# Patient Record
Sex: MALE | Race: WHITE | NOT HISPANIC OR LATINO | Employment: FULL TIME | URBAN - METROPOLITAN AREA
[De-identification: names, ages, dates, MRNs, and addresses within clinical notes are randomized per-mention and may not be internally consistent; named-entity substitution may affect disease eponyms.]

---

## 2017-06-29 RX ORDER — ALPRAZOLAM 0.5 MG/1
0.5 TABLET ORAL DAILY PRN
Qty: 10 TABLET | Refills: 0 | Status: SHIPPED | OUTPATIENT
Start: 2017-06-29 | End: 2019-02-05 | Stop reason: SDUPTHER

## 2017-08-18 ENCOUNTER — OFFICE VISIT (OUTPATIENT)
Dept: INTERNAL MEDICINE | Facility: CLINIC | Age: 42
End: 2017-08-18
Payer: COMMERCIAL

## 2017-08-18 VITALS
HEIGHT: 70 IN | SYSTOLIC BLOOD PRESSURE: 130 MMHG | BODY MASS INDEX: 27.36 KG/M2 | TEMPERATURE: 98 F | WEIGHT: 191.13 LBS | DIASTOLIC BLOOD PRESSURE: 70 MMHG

## 2017-08-18 DIAGNOSIS — M10.9 GOUT, UNSPECIFIED CAUSE, UNSPECIFIED CHRONICITY, UNSPECIFIED SITE: Primary | ICD-10-CM

## 2017-08-18 PROCEDURE — 3008F BODY MASS INDEX DOCD: CPT | Mod: S$GLB,,, | Performed by: INTERNAL MEDICINE

## 2017-08-18 PROCEDURE — 99213 OFFICE O/P EST LOW 20 MIN: CPT | Mod: S$GLB,,, | Performed by: INTERNAL MEDICINE

## 2017-08-18 PROCEDURE — 99999 PR PBB SHADOW E&M-EST. PATIENT-LVL III: CPT | Mod: PBBFAC,,, | Performed by: INTERNAL MEDICINE

## 2017-08-18 RX ORDER — INDOMETHACIN 50 MG/1
50 CAPSULE ORAL EVERY 8 HOURS PRN
Qty: 15 CAPSULE | Refills: 0 | Status: SHIPPED | OUTPATIENT
Start: 2017-08-18 | End: 2017-09-12

## 2017-08-18 NOTE — PROGRESS NOTES
Subjective:       Patient ID: Jarod Arias is a 42 y.o. male.    Chief Complaint: Toe Pain (pt reports of not walking at times, pain was more intense x1day ago. pt mentioned he had sharp pain and swelling on big toe on R foot.)    HPI     Patient is a 42 year old male here today for toe pain. Located at the right great toe, associated with redness and swelling. Began last night and was shooting, hurts to bear weight, walk and bend the great toe. No trauma, no new shoes. No skin laceration. No history of DM. No fever/chills.     Review of Systems   Constitutional: Positive for activity change. Negative for unexpected weight change.   HENT: Negative for hearing loss, rhinorrhea and trouble swallowing.    Eyes: Negative for discharge and visual disturbance.   Respiratory: Negative for chest tightness and wheezing.    Cardiovascular: Negative for chest pain and palpitations.   Gastrointestinal: Negative for blood in stool, constipation, diarrhea and vomiting.   Endocrine: Negative for polydipsia and polyuria.   Genitourinary: Negative for difficulty urinating, hematuria and urgency.   Musculoskeletal: Positive for arthralgias and joint swelling. Negative for neck pain.   Neurological: Negative for weakness and headaches.   Psychiatric/Behavioral: Negative for confusion and dysphoric mood.       Objective:      Physical Exam   Constitutional: He is oriented to person, place, and time. He appears well-developed and well-nourished. No distress.   HENT:   Head: Normocephalic and atraumatic.   Musculoskeletal:   Right great toe:  Erythema, warmth swelling noted to the R MTP joint  No skin breakdown  2+ dorsalis pedis pulse  <2 sec cap refill   Neurological: He is alert and oriented to person, place, and time.   Skin: Skin is warm and dry. He is not diaphoretic.   Nursing note and vitals reviewed.      Assessment:       1. Gout, unspecified cause, unspecified chronicity, unspecified site        Plan:       Indocin  50 mg every 8 hours prn pain, GI side effects reviewed  If no improvement, let us know and additional blood work/imaging can be done  Dietary and lifestyle changes reviewed   RTC PRN

## 2017-09-12 ENCOUNTER — LAB VISIT (OUTPATIENT)
Dept: LAB | Facility: HOSPITAL | Age: 42
End: 2017-09-12
Attending: INTERNAL MEDICINE
Payer: COMMERCIAL

## 2017-09-12 ENCOUNTER — OFFICE VISIT (OUTPATIENT)
Dept: INTERNAL MEDICINE | Facility: CLINIC | Age: 42
End: 2017-09-12
Payer: COMMERCIAL

## 2017-09-12 VITALS
SYSTOLIC BLOOD PRESSURE: 112 MMHG | DIASTOLIC BLOOD PRESSURE: 86 MMHG | BODY MASS INDEX: 26.45 KG/M2 | HEART RATE: 68 BPM | HEIGHT: 70 IN | WEIGHT: 184.75 LBS

## 2017-09-12 DIAGNOSIS — M10.071 ACUTE IDIOPATHIC GOUT INVOLVING TOE OF RIGHT FOOT: ICD-10-CM

## 2017-09-12 DIAGNOSIS — M10.071 ACUTE IDIOPATHIC GOUT INVOLVING TOE OF RIGHT FOOT: Primary | ICD-10-CM

## 2017-09-12 LAB
ALBUMIN SERPL BCP-MCNC: 4.4 G/DL
ALP SERPL-CCNC: 59 U/L
ALT SERPL W/O P-5'-P-CCNC: 22 U/L
ANION GAP SERPL CALC-SCNC: 8 MMOL/L
AST SERPL-CCNC: 24 U/L
BILIRUB SERPL-MCNC: 0.7 MG/DL
BILIRUB UR QL STRIP: NEGATIVE
BUN SERPL-MCNC: 13 MG/DL
CALCIUM SERPL-MCNC: 9.5 MG/DL
CHLORIDE SERPL-SCNC: 107 MMOL/L
CLARITY UR REFRACT.AUTO: CLEAR
CO2 SERPL-SCNC: 27 MMOL/L
COLOR UR AUTO: ABNORMAL
CREAT SERPL-MCNC: 1.3 MG/DL
EST. GFR  (AFRICAN AMERICAN): >60 ML/MIN/1.73 M^2
EST. GFR  (NON AFRICAN AMERICAN): >60 ML/MIN/1.73 M^2
GLUCOSE SERPL-MCNC: 90 MG/DL
GLUCOSE UR QL STRIP: NEGATIVE
HGB UR QL STRIP: NEGATIVE
KETONES UR QL STRIP: ABNORMAL
LEUKOCYTE ESTERASE UR QL STRIP: NEGATIVE
NITRITE UR QL STRIP: NEGATIVE
PH UR STRIP: 6 [PH] (ref 5–8)
POTASSIUM SERPL-SCNC: 4.6 MMOL/L
PROT SERPL-MCNC: 7.6 G/DL
PROT UR QL STRIP: NEGATIVE
SODIUM SERPL-SCNC: 142 MMOL/L
SP GR UR STRIP: 1 (ref 1–1.03)
URATE SERPL-MCNC: 9 MG/DL
URN SPEC COLLECT METH UR: ABNORMAL
UROBILINOGEN UR STRIP-ACNC: NEGATIVE EU/DL

## 2017-09-12 PROCEDURE — 3008F BODY MASS INDEX DOCD: CPT | Mod: S$GLB,,, | Performed by: INTERNAL MEDICINE

## 2017-09-12 PROCEDURE — 80053 COMPREHEN METABOLIC PANEL: CPT

## 2017-09-12 PROCEDURE — 36415 COLL VENOUS BLD VENIPUNCTURE: CPT

## 2017-09-12 PROCEDURE — 99999 PR PBB SHADOW E&M-EST. PATIENT-LVL III: CPT | Mod: PBBFAC,,, | Performed by: INTERNAL MEDICINE

## 2017-09-12 PROCEDURE — 84550 ASSAY OF BLOOD/URIC ACID: CPT

## 2017-09-12 PROCEDURE — 99213 OFFICE O/P EST LOW 20 MIN: CPT | Mod: S$GLB,,, | Performed by: INTERNAL MEDICINE

## 2017-09-12 PROCEDURE — 81003 URINALYSIS AUTO W/O SCOPE: CPT

## 2017-09-12 NOTE — PROGRESS NOTES
CHIEF COMPLAINT:  Followup.    HISTORY OF PRESENT ILLNESS:  The patient is a 42-year-old gentleman who was   recently seen by Dr. Velazquez on 08/18/2017, what appeared to be a gout attack   involving his right great toe.  The patient had been to [place], was eating a   lot of meat and drinking a lot of beer when the symptoms started.  Since then,   the patient has cut back on the meat and beer, he has lost about 15 pounds;   however, he states that he has been having slight flare-ups.  Last night, he ate   an 8 ounce steak.  This morning he still feels a little tight.  He has been   using ibuprofen, which appears to help.    REVIEW OF SYSTEMS:  The patient reports that he lost 15 pounds, however, when he   went to [place] he put it back on.  He does report again an 8-pound weight   loss since he saw Dr. Velazquez.  No problems with abdominal discomfort secondary to   NSAIDs.  The patient did not start Indocin out of concern of side effects.    Please see the patient own review of systems.  The patient reports having some   joint pain and arthralgias involving his toe.  He states that his toe currently   is not very painful.  He is aware of it, but he is able to ambulate and walk.    PHYSICAL EXAMINATION:  PULMONARY:  Good inspiratory, expiratory breath sounds are heard.  Lungs are   clear to auscultation.  CARDIOVASCULAR:  S1, S2.  EXTREMITIES:  Without edema.  ABDOMEN:  Nontender, nondistended, without hepatosplenomegaly.  The patient's right fifth toe was evaluated.  He had 2+ dorsal pedal pulses.    The toe was not red.  I could actually manipulate it without much discomfort.    The area in question appears to be the MTP.    ASSESSMENT:  Gout of the right foot.    PLAN:  We will put the patient in for CMP, uric acid level as well as a UA.  He   is to continue watch his diet.  He is to follow up pending results.      MIRNA/LILIYA  dd: 09/12/2017 14:00:37 (CDT)  td: 09/13/2017 01:35:44 (CDT)  Doc ID   #[id number]  Job ID  #[id number]    CC:     Answers for HPI/ROS submitted by the patient on 9/12/2017   activity change: No  unexpected weight change: No  neck pain: No  hearing loss: No  rhinorrhea: No  trouble swallowing: No  eye discharge: No  visual disturbance: No  chest tightness: No  wheezing: No  chest pain: No  palpitations: No  blood in stool: No  constipation: No  vomiting: No  diarrhea: No  polydipsia: No  polyuria: No  difficulty urinating: No  urgency: No  hematuria: No  joint swelling: Yes  arthralgias: Yes  headaches: No  weakness: No  confusion: No  dysphoric mood: No

## 2017-09-13 ENCOUNTER — TELEPHONE (OUTPATIENT)
Dept: INTERNAL MEDICINE | Facility: CLINIC | Age: 42
End: 2017-09-13

## 2017-09-13 ENCOUNTER — PATIENT MESSAGE (OUTPATIENT)
Dept: INTERNAL MEDICINE | Facility: CLINIC | Age: 42
End: 2017-09-13

## 2017-09-13 DIAGNOSIS — M10.00 ACUTE IDIOPATHIC GOUT, UNSPECIFIED SITE: Primary | ICD-10-CM

## 2017-09-13 NOTE — TELEPHONE ENCOUNTER
----- Message from Alex Schofield MD sent at 9/13/2017  7:04 AM CDT -----  Please contact and schedule a 24 hour urine for uric acid. Order is in.

## 2017-09-14 NOTE — TELEPHONE ENCOUNTER
Notified pt I  schedule a 24 hour urine for uric acid per Dr Schofield . Pt states he will come get the container tomorrow

## 2017-09-14 NOTE — TELEPHONE ENCOUNTER
----- Message from Khalida Johnson sent at 9/14/2017  8:20 AM CDT -----  Contact: Self/[phone]  Type: Returning a call    Who left a message?Shruthi    When did the practice call?today 9/14/2017    Comments:Please advise        Thanks!!!

## 2017-09-21 ENCOUNTER — LAB VISIT (OUTPATIENT)
Dept: LAB | Facility: HOSPITAL | Age: 42
End: 2017-09-21
Attending: INTERNAL MEDICINE
Payer: COMMERCIAL

## 2017-09-21 DIAGNOSIS — M10.00 ACUTE IDIOPATHIC GOUT, UNSPECIFIED SITE: ICD-10-CM

## 2017-09-21 LAB
URATE 24H UR-MRATE: 18.3 MG/HR
URATE UR-MCNC: 17.2 MG/DL
URIC ACID URINE (MG/SPEC): 438.6 MG/SPEC
URINE COLLECTION DURATION: 24 HR
URINE VOLUME: 2550 ML

## 2017-09-21 PROCEDURE — 84560 ASSAY OF URINE/URIC ACID: CPT

## 2017-10-07 ENCOUNTER — PATIENT MESSAGE (OUTPATIENT)
Dept: INTERNAL MEDICINE | Facility: CLINIC | Age: 42
End: 2017-10-07

## 2017-11-01 ENCOUNTER — OFFICE VISIT (OUTPATIENT)
Dept: INTERNAL MEDICINE | Facility: CLINIC | Age: 42
End: 2017-11-01
Payer: COMMERCIAL

## 2017-11-01 VITALS
RESPIRATION RATE: 20 BRPM | HEIGHT: 71 IN | DIASTOLIC BLOOD PRESSURE: 72 MMHG | WEIGHT: 177.25 LBS | HEART RATE: 80 BPM | SYSTOLIC BLOOD PRESSURE: 104 MMHG | BODY MASS INDEX: 24.81 KG/M2

## 2017-11-01 DIAGNOSIS — Z12.5 PROSTATE CANCER SCREENING: ICD-10-CM

## 2017-11-01 DIAGNOSIS — M1A.0710 CHRONIC IDIOPATHIC GOUT INVOLVING TOE OF RIGHT FOOT WITHOUT TOPHUS: ICD-10-CM

## 2017-11-01 DIAGNOSIS — Z00.00 ANNUAL PHYSICAL EXAM: Primary | ICD-10-CM

## 2017-11-01 LAB
BILIRUB UR QL STRIP: NEGATIVE
CLARITY UR REFRACT.AUTO: CLEAR
COLOR UR AUTO: YELLOW
GLUCOSE UR QL STRIP: NEGATIVE
HGB UR QL STRIP: NEGATIVE
KETONES UR QL STRIP: NEGATIVE
LEUKOCYTE ESTERASE UR QL STRIP: NEGATIVE
NITRITE UR QL STRIP: NEGATIVE
PH UR STRIP: 7 [PH] (ref 5–8)
PROT UR QL STRIP: NEGATIVE
SP GR UR STRIP: 1.01 (ref 1–1.03)
URN SPEC COLLECT METH UR: NORMAL
UROBILINOGEN UR STRIP-ACNC: NEGATIVE EU/DL

## 2017-11-01 PROCEDURE — 81003 URINALYSIS AUTO W/O SCOPE: CPT

## 2017-11-01 PROCEDURE — 99999 PR PBB SHADOW E&M-EST. PATIENT-LVL III: CPT | Mod: PBBFAC,,, | Performed by: INTERNAL MEDICINE

## 2017-11-01 PROCEDURE — 99396 PREV VISIT EST AGE 40-64: CPT | Mod: S$GLB,,, | Performed by: INTERNAL MEDICINE

## 2017-11-01 RX ORDER — ALLOPURINOL 100 MG/1
100 TABLET ORAL DAILY
Qty: 30 TABLET | Refills: 3 | Status: SHIPPED | OUTPATIENT
Start: 2017-11-01 | End: 2017-11-29 | Stop reason: SDUPTHER

## 2017-11-01 NOTE — PROGRESS NOTES
CHIEF COMPLAINT:  Physical exam.    HISTORY OF PRESENT ILLNESS:  The patient is a 42-year-old gentleman who comes in   today for annual physical exam.  The patient was recently diagnosed with gout   involving his right great toe.  Symptoms started after he had gone to [place]   and had been eating a lot of meat.  The patient since then modified his diet and   has been eating very little red meat, mostly vegetables.  He has lost a good   bit of weight.  He reports having three minor episodes of gout despite modifying   his diet.  This does have him concerned.    REVIEW OF SYSTEMS:  Please see the patient-entered review of systems.  The   patient did lose weight, which is not unexpected since he went on a diet.  He   has his eyes checked at [facility].  The patient has not been   exercising.  He does get a little bit of constipation on occasion despite going   on a high-fiber diet, eating vegetables.    PHYSICAL EXAMINATION:  GENERAL APPEARANCE:  No acute distress.  HEENT:  Conjunctivae are clear.  Pupils are equal.  TMs are clear.  Nasal septum   is midline without discharge.  Oropharynx is without erythema.  NECK:  Trachea is midline without thyromegaly.  PULMONARY:  Good inspiratory and expiratory breath sounds are heard.  Lungs are   clear to auscultation.  CARDIOVASCULAR:  S1, S2.  2+ carotid pulses without bruits.  EXTREMITIES:  Without edema.  The patient's  right foot was evaluated.  No   evidence of gout currently.  ABDOMEN:  Nontender, nondistended without hepatosplenomegaly.  GENITOURINARY:  Penis and testes were normal.  LYMPHATICS:  No cervical, axillary or inguinal adenopathy.    ASSESSMENT:  1.  Annual physical exam.  2.  Chronic gout.    PLAN:  We will put the patient in to see Rheumatology.  We will also start him   on allopurinol 100 mg once a day.  We will also schedule a CBC, CMP, lipid   panel, PSA, uric acid and UA.  The patient is to follow up pending results.      MIRNA/LILIYA  dd:  11/01/2017 14:11:04 (CDT)  td: 11/02/2017 10:23:22 (CDT)  Doc ID   #[id number]  Job ID #[id number]    CC:     Answers for HPI/ROS submitted by the patient on 10/30/2017   activity change: No  unexpected weight change: No  neck pain: No  hearing loss: No  rhinorrhea: No  trouble swallowing: No  eye discharge: No  visual disturbance: No  chest tightness: No  wheezing: No  chest pain: No  palpitations: No  blood in stool: No  constipation: No  vomiting: No  diarrhea: No  polydipsia: No  polyuria: No  difficulty urinating: No  urgency: No  hematuria: No  joint swelling: No  arthralgias: No  headaches: No  weakness: No  confusion: No  dysphoric mood: No

## 2017-11-03 ENCOUNTER — LAB VISIT (OUTPATIENT)
Dept: LAB | Facility: HOSPITAL | Age: 42
End: 2017-11-03
Attending: INTERNAL MEDICINE
Payer: COMMERCIAL

## 2017-11-03 DIAGNOSIS — M1A.0710 CHRONIC IDIOPATHIC GOUT INVOLVING TOE OF RIGHT FOOT WITHOUT TOPHUS: ICD-10-CM

## 2017-11-03 DIAGNOSIS — Z12.5 PROSTATE CANCER SCREENING: ICD-10-CM

## 2017-11-03 DIAGNOSIS — Z00.00 ANNUAL PHYSICAL EXAM: ICD-10-CM

## 2017-11-03 LAB
ALBUMIN SERPL BCP-MCNC: 4.6 G/DL
ALP SERPL-CCNC: 54 U/L
ALT SERPL W/O P-5'-P-CCNC: 17 U/L
ANION GAP SERPL CALC-SCNC: 9 MMOL/L
AST SERPL-CCNC: 21 U/L
BASOPHILS # BLD AUTO: 0.02 K/UL
BASOPHILS NFR BLD: 0.5 %
BILIRUB SERPL-MCNC: 0.5 MG/DL
BUN SERPL-MCNC: 10 MG/DL
CALCIUM SERPL-MCNC: 9.7 MG/DL
CHLORIDE SERPL-SCNC: 107 MMOL/L
CHOLEST SERPL-MCNC: 169 MG/DL
CHOLEST/HDLC SERPL: 3.1 {RATIO}
CO2 SERPL-SCNC: 26 MMOL/L
COMPLEXED PSA SERPL-MCNC: 0.25 NG/ML
CREAT SERPL-MCNC: 1.1 MG/DL
DIFFERENTIAL METHOD: NORMAL
EOSINOPHIL # BLD AUTO: 0.2 K/UL
EOSINOPHIL NFR BLD: 4.8 %
ERYTHROCYTE [DISTWIDTH] IN BLOOD BY AUTOMATED COUNT: 14.3 %
EST. GFR  (AFRICAN AMERICAN): >60 ML/MIN/1.73 M^2
EST. GFR  (NON AFRICAN AMERICAN): >60 ML/MIN/1.73 M^2
GLUCOSE SERPL-MCNC: 93 MG/DL
HCT VFR BLD AUTO: 46.4 %
HDLC SERPL-MCNC: 54 MG/DL
HDLC SERPL: 32 %
HGB BLD-MCNC: 15.8 G/DL
LDLC SERPL CALC-MCNC: 100.6 MG/DL
LYMPHOCYTES # BLD AUTO: 1.5 K/UL
LYMPHOCYTES NFR BLD: 34.9 %
MCH RBC QN AUTO: 29 PG
MCHC RBC AUTO-ENTMCNC: 34.1 G/DL
MCV RBC AUTO: 85 FL
MONOCYTES # BLD AUTO: 0.5 K/UL
MONOCYTES NFR BLD: 11.2 %
NEUTROPHILS # BLD AUTO: 2.1 K/UL
NEUTROPHILS NFR BLD: 48.1 %
NONHDLC SERPL-MCNC: 115 MG/DL
PLATELET # BLD AUTO: 252 K/UL
PMV BLD AUTO: 9.5 FL
POTASSIUM SERPL-SCNC: 5.2 MMOL/L
PROT SERPL-MCNC: 7.5 G/DL
RBC # BLD AUTO: 5.45 M/UL
SODIUM SERPL-SCNC: 142 MMOL/L
TRIGL SERPL-MCNC: 72 MG/DL
URATE SERPL-MCNC: 8.2 MG/DL
WBC # BLD AUTO: 4.38 K/UL

## 2017-11-03 PROCEDURE — 84153 ASSAY OF PSA TOTAL: CPT

## 2017-11-03 PROCEDURE — 36415 COLL VENOUS BLD VENIPUNCTURE: CPT

## 2017-11-03 PROCEDURE — 80061 LIPID PANEL: CPT

## 2017-11-03 PROCEDURE — 85025 COMPLETE CBC W/AUTO DIFF WBC: CPT

## 2017-11-03 PROCEDURE — 84550 ASSAY OF BLOOD/URIC ACID: CPT

## 2017-11-03 PROCEDURE — 80053 COMPREHEN METABOLIC PANEL: CPT

## 2017-11-04 ENCOUNTER — PATIENT MESSAGE (OUTPATIENT)
Dept: INTERNAL MEDICINE | Facility: CLINIC | Age: 42
End: 2017-11-04

## 2017-11-04 DIAGNOSIS — E87.5 SERUM POTASSIUM ELEVATED: Primary | ICD-10-CM

## 2017-11-09 ENCOUNTER — LAB VISIT (OUTPATIENT)
Dept: LAB | Facility: HOSPITAL | Age: 42
End: 2017-11-09
Attending: INTERNAL MEDICINE
Payer: COMMERCIAL

## 2017-11-09 DIAGNOSIS — E87.5 SERUM POTASSIUM ELEVATED: ICD-10-CM

## 2017-11-09 LAB
ANION GAP SERPL CALC-SCNC: 7 MMOL/L
BUN SERPL-MCNC: 12 MG/DL
CALCIUM SERPL-MCNC: 9 MG/DL
CHLORIDE SERPL-SCNC: 106 MMOL/L
CO2 SERPL-SCNC: 28 MMOL/L
CREAT SERPL-MCNC: 1.2 MG/DL
EST. GFR  (AFRICAN AMERICAN): >60 ML/MIN/1.73 M^2
EST. GFR  (NON AFRICAN AMERICAN): >60 ML/MIN/1.73 M^2
GLUCOSE SERPL-MCNC: 138 MG/DL
POTASSIUM SERPL-SCNC: 4.2 MMOL/L
SODIUM SERPL-SCNC: 141 MMOL/L

## 2017-11-09 PROCEDURE — 80048 BASIC METABOLIC PNL TOTAL CA: CPT

## 2017-11-09 PROCEDURE — 36415 COLL VENOUS BLD VENIPUNCTURE: CPT

## 2017-11-12 ENCOUNTER — PATIENT MESSAGE (OUTPATIENT)
Dept: INTERNAL MEDICINE | Facility: CLINIC | Age: 42
End: 2017-11-12

## 2017-11-29 ENCOUNTER — OFFICE VISIT (OUTPATIENT)
Dept: RHEUMATOLOGY | Facility: CLINIC | Age: 42
End: 2017-11-29
Payer: COMMERCIAL

## 2017-11-29 VITALS
DIASTOLIC BLOOD PRESSURE: 79 MMHG | RESPIRATION RATE: 18 BRPM | SYSTOLIC BLOOD PRESSURE: 115 MMHG | HEART RATE: 70 BPM | WEIGHT: 173.63 LBS | BODY MASS INDEX: 24.56 KG/M2

## 2017-11-29 DIAGNOSIS — Z55.9 EDUCATIONAL CIRCUMSTANCE: ICD-10-CM

## 2017-11-29 DIAGNOSIS — M10.9 GOUT, ARTHRITIS: Primary | ICD-10-CM

## 2017-11-29 PROCEDURE — 99999 PR PBB SHADOW E&M-EST. PATIENT-LVL III: CPT | Mod: PBBFAC,,, | Performed by: INTERNAL MEDICINE

## 2017-11-29 PROCEDURE — 99244 OFF/OP CNSLTJ NEW/EST MOD 40: CPT | Mod: S$GLB,,, | Performed by: INTERNAL MEDICINE

## 2017-11-29 RX ORDER — COLCHICINE 0.6 MG/1
TABLET ORAL
Qty: 70 TABLET | Refills: 3 | Status: SHIPPED | OUTPATIENT
Start: 2017-11-29 | End: 2019-07-24 | Stop reason: SDUPTHER

## 2017-11-29 RX ORDER — ALLOPURINOL 100 MG/1
300 TABLET ORAL DAILY
Qty: 90 TABLET | Refills: 2 | Status: SHIPPED | OUTPATIENT
Start: 2017-11-29 | End: 2018-04-20 | Stop reason: SDUPTHER

## 2017-11-29 SDOH — SOCIAL DETERMINANTS OF HEALTH (SDOH): PROBLEMS RELATED TO EDUCATION AND LITERACY, UNSPECIFIED: Z55.9

## 2017-11-29 ASSESSMENT — ROUTINE ASSESSMENT OF PATIENT INDEX DATA (RAPID3)
AM STIFFNESS SCORE: 0, NO
FATIGUE SCORE: 0
PSYCHOLOGICAL DISTRESS SCORE: 0
PATIENT GLOBAL ASSESSMENT SCORE: 0
PAIN SCORE: 0
TOTAL RAPID3 SCORE: 0
MDHAQ FUNCTION SCORE: 0

## 2017-11-29 NOTE — PROGRESS NOTES
Subjective:       Patient ID: Jarod Arias Sr. is a 42 y.o. male sent by Dr. Schofield for a consultation on gout    Chief Complaint: No chief complaint on file.    HPI   Generally healthy 42 yr old man sent for joint pain and swelling c/w gout.   On Aug 17 felt like he stubbed his R big toe. It became sore, red & painful to touch & swollen.  Saw Dr. Velazquez and she felt it was gout.  He was given information and prescribed indomethacin but he did not take it. It resolved wi 2 days on its own.    Has subsequently had small attacks always in his R toe --4-5 episodes characterized mostly by soreness, but he was able to walk. These tended to resolve on their own within 1 day.  In October had an attack that lasted a bit longer and also had an episode on 11/13 that took 2 days to resolve. Ibuprofen 400 mg helped.  Was started on allopurinol 100 mg/d about 1 month ago.   Used to drink once a week but over the summer drank more. He drank twice a week anywhere from 2-4 beers at a time. No homemade liquor. Had also gotten heavy and had poor eating habits. Now has been paying attention to his diet and has lost at least 20 lbs in 3 months. No personal hx of renal calculi. No Pb exposure. No renal calculi.   Has a maternal uncle that probably has gout.  No family hx of kidney stones.           Current Outpatient Prescriptions   Medication Sig Dispense Refill    allopurinol (ZYLOPRIM) 100 MG tablet Take 1 tablet (100 mg total) by mouth once daily. 30 tablet 3    CETIRIZINE HCL (ZYRTEC ORAL) Take by mouth.      alprazolam (XANAX) 0.5 MG tablet Take 1 tablet (0.5 mg total) by mouth daily as needed for Anxiety. 10 tablet 0     No current facility-administered medications for this visit.        Review of patient's allergies indicates:   Allergen Reactions    No known drug allergies        Past Medical History:   Diagnosis Date    Allergy     Elevated blood pressure reading without diagnosis of hypertension     Gout      Left wrist fracture      Past Surgical History:   Procedure Laterality Date    PILONIDAL CYST DRAINAGE      WISDOM TOOTH EXTRACTION             Review of Systems   Constitutional: Negative.  Negative for fatigue and fever.   HENT: Negative.  Negative for hearing loss, mouth sores, sore throat, tinnitus and trouble swallowing.    Eyes: Negative.  Negative for visual disturbance.   Respiratory: Negative.  Negative for cough, choking, chest tightness and shortness of breath.    Cardiovascular: Negative.  Negative for chest pain, palpitations and leg swelling.   Gastrointestinal: Negative.  Negative for abdominal pain, blood in stool, constipation, diarrhea, nausea and vomiting.   Genitourinary: Negative.  Negative for frequency, hematuria and urgency.   Musculoskeletal: Negative.  Negative for back pain, myalgias, neck pain and neck stiffness.   Skin: Negative.  Negative for rash.   Neurological: Negative.  Negative for dizziness, syncope, numbness and headaches.   Hematological: Does not bruise/bleed easily.   Psychiatric/Behavioral: Negative.  Negative for dysphoric mood and sleep disturbance. The patient is not nervous/anxious.        Family History   Problem Relation Age of Onset    Cancer Maternal Grandmother 65     Lung    Cancer Maternal Grandfather      M:  at 60: COPD;   F: 67: HBP, CAD,  Maternal uncle with probable gout.   1 S: A& W  14 yr & 11 yr old sons in good health.   Social History   Substance Use Topics    Smoking status: Never Smoker    Smokeless tobacco: Former User    Alcohol use No   Is an .  Plays in softball league intermittently.  No formal exercise.   Objective:  /79   Pulse 70   Resp 18   Wt 78.7 kg (173 lb 9.6 oz)   BMI 24.56 kg/m²      Physical Exam   Vitals reviewed.  Constitutional: He is oriented to person, place, and time and well-developed, well-nourished, and in no distress. No distress.   HENT:   Head: Normocephalic and atraumatic.   Mouth/Throat:  Oropharynx is clear and moist. No oropharyngeal exudate.   No facial rashes  Parotids not enlarged  No oral ulcers   Eyes: Conjunctivae and EOM are normal. Pupils are equal, round, and reactive to light. Right eye exhibits no discharge. Left eye exhibits no discharge. No scleral icterus.   Neck: Neck supple. No JVD present. No tracheal deviation present. No thyromegaly present.   Cardiovascular: Normal rate, regular rhythm, normal heart sounds and intact distal pulses.  Exam reveals no gallop and no friction rub.    No murmur heard.  Pulmonary/Chest: Effort normal and breath sounds normal. No respiratory distress. He has no wheezes. He has no rales. He exhibits no tenderness.   Abdominal: Soft. Bowel sounds are normal. He exhibits no distension and no mass. There is no splenomegaly or hepatomegaly. There is no tenderness. There is no rebound and no guarding.   Lymphadenopathy:     He has no cervical adenopathy.        Right: No inguinal adenopathy present.        Left: No inguinal adenopathy present.   Neurological: He is alert and oriented to person, place, and time. He has normal reflexes. No cranial nerve deficit. Gait normal.   Proximal and distal muscle strength 5/5.   Skin: Skin is warm and dry. No rash noted. He is not diaphoretic.     Psychiatric: Mood, memory, affect and judgment normal.   Musculoskeletal: Normal range of motion. He exhibits no edema or tenderness.   Cspine FROM no tenderness  Tspine FROM no tenderness  Lspine FROM no tenderness.  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  Diet Do's and Don'ts reviewed.  Handouts provided.  He will increase allopurinol to 200 mg/d and have labs done in 6 weeks.  Target UA level is 5.  Discussed use of colchicine as a preventative.  He does not want to take daily.  He wants an rx to take to abort an acute attack: take 2 followed by 1, one hr later.  Side effects and toxicities of both allopurinol and colchicine reviewed.  Handouts provided.   He will contact us if he has any problems and will contact us after labs are drawn.  Encouraged exercise.     RTC 6 months.     CC: Alex Schofield MD

## 2017-11-29 NOTE — LETTER
November 29, 2017      Alex Schofield MD  [address]  [place] [address]           [facility] - Rheumatology  [address]  [place] [address]  Phone: [phone]  Fax: [phone]          Patient: Jarod Arias Sr.   MR Number: [id number]   YOB: 1975   Date of Visit: 11/29/2017       Dear Dr. Alex Schofield:    Thank you for referring Jarod Arias to me for evaluation. Attached you will find relevant portions of my assessment and plan of care.    If you have questions, please do not hesitate to call me. I look forward to following Jarod Arias along with you.    Sincerely,    Roxana Whitman MD    Enclosure  CC:  No Recipients    If you would like to receive this communication electronically, please contact [email or web address] or [phone] to request more information on [employer] Link access.    For providers and/or their staff who would like to refer a patient to [facility], please contact us through our one-stop-shop provider referral line, [facility], at [phone].    If you feel you have received this communication in error or would no longer like to receive these types of communications, please e-mail [email or web address]

## 2017-11-29 NOTE — PATIENT INSTRUCTIONS
Increase allopurinol to 200 mg/day and stay on this dose until labs result.    You can take colchicine to abort an attack: take 2 immediately and 1, one hour later.       Gout    Gout is an inflammation of a joint due to a build-up of gout crystals in the joint fluid. This occurs when there is an excess of uric acid (a normal waste product) in the body. Uric acid builds up in the body when the kidneys are unable to filter enough of it from the blood. This may occur with age. It is also associated with kidney disease. Gout occurs more often in people with obesity, diabetes, high blood pressure, or high levels of fats in the blood. It may run in families. Gout tends to come and go. A flare up of gout is called an attack. Drinking alcohol or eating certain foods (such as shellfish or foods with additives such as high-fructose corn syrup) may increase uric acid levels in the blood and cause a gout attack.  During a gout attack, the affected joint may become a hot, red, swollen and painful. If you have had one attack of gout, you are likely to have another. An attack of gout can be treated with medicine. If these attacks become frequent, a daily medicine may be prescribed to help the kidneys remove uric acid from the body.  Home care  During a gout attack:  · Rest painful joints. If gout affects the joints of your foot or leg, you may want to use crutches for the first few days to keep from bearing weight on the affected joint.  · When sitting or lying down, raise the painful joint to a level higher than your heart.  · Apply an ice pack (ice cubes in a plastic bag wrapped in a thin towel) over the injured area for 20 minutes every 1 to 2 hours the first day for pain relief. Continue this 3 to 4 times a day for swelling and pain.  · Avoid alcohol and foods listed below (see Preventing attacks) during a gout attack. Drink extra fluid to help flush the uric acid through your kidneys.  · If you were prescribed a medicine to  treat gout, take it as your healthcare provider has instructed. Don't skip doses.  · Take anti-inflammatory medicine as directed.   · If pain medicines have been prescribed, take them exactly as directed.    Preventing attacks  · Minimize or avoid alcohol use. Excess alcohol intake can cause a gout attack.  · Limit these foods and beverages:  ¨ Organ meats, such as kidneys and liver  ¨ Certain seafoods (anchovies, sardines, shrimp, scallops, herring, mackerel)  ¨ Wild game, meat extracts and meat gravies  ¨ Foods and beverages sweetened with high-fructose corn syrup, such as sodas  · Eat a healthy diet including low-fat and nonfat dairy, whole grains, and vegetables.  · If you are overweight, talk to your healthcare provider about a weight reduction plan. Avoid fasting or extreme low calorie diets (less than 900 calories per day). This will increase uric acid levels in the body.  · If you have diabetes or high blood pressure, work with your doctor to manage these conditions.  · Protect the joint from injury. Trauma can trigger a gout attack.  Follow-up care  Follow up with your healthcare provider, or as advised.   When to seek medical advice  Call your healthcare provider if you have any of the following:  · Fever over 100.4°F (38.ºC) with worsening joint pain  · Increasing redness around the joint  · Pain developing in another joint  · Repeated vomiting, abdominal pain, or blood in the vomit or stool (black or red color)  Date Last Reviewed: 3/1/2017  © 1417-5715 [employer]. [address] [place] [address], [place] [address]. All rights reserved. This information is not intended as a substitute for professional medical care. Always follow your healthcare professional's instructions.        Treating Gout Attacks     Raising the joint above the level of your heart can help reduce gout symptoms.     Gout is a disease that affects the joints. It is caused by excess uric acid in your blood stream that may lead to  crystals forming in your joints. Left untreated, it can lead to painful foot and joint deformities and even kidney problems. But, by treating gout early, you can relieve pain and help prevent future problems. Gout can usually be treated with medication and proper diet. In severe cases, surgery may be needed.  Gout attacks are painful and often happen more than once. Taking medications may reduce pain and prevent attacks in the future. There are also some things you can do at home to relieve symptoms.  Medications for gout  Your healthcare provider may prescribe a daily medication to reduce levels of uric acid. Reducing your uric acid levels may help prevent gout attacks. Allopurinol is one commonly used medication taken daily to reduce uric acid levels. Other medications can help relieve pain and swelling during an acute attack. Medicines such as NSAIDs (nonsteroidal anti-inflammatory medicines), steroids, and colchicine may be prescribed for intermittent use to relieve an acute gout attack. Be sure to take your medication as directed.  What you can do  Below are some things you can do at home to relieve gout symptoms. Your healthcare provider may have other tips.  · Rest the painful joint as much as you can.  · Raise the painful joint so it is at a level higher than your heart.  · Use ice for 10 minutes every 1-2 hours as possible.  How can I prevent gout?  With a little effort, you may be able to prevent gout attacks in the future. Here are some things you can do:  · Avoid foods high in purines  ¨ Certain meats (red meat, processed meat, turkey)  ¨ Organ meats (kidney, liver, sweetbread)  ¨ Shellfish (lobster, crab, shrimp, scallop, mussel)  ¨ Certain fish (anchovy, sardine, herring, mackerel)  · Take any medications prescribed by your healthcare provider.  · Lose weight if you need to.  · Reduce high fructose corn syrup in meals and drinks.  · Reduce or eliminate consumption of alcohol, particularly beer, but  also red wine and spirits.  · Control blood pressure, diabetes, and cholesterol.  · Drink plenty of water to help flush uric acid from your body.  Date Last Reviewed: 2/1/2016 © 2000-2017 [employer]. [address] [place] [address], [place] [address]. All rights reserved. This information is not intended as a substitute for professional medical care. Always follow your healthcare professional's instructions.        Gout Diet  Gout is a painful condition caused by an excess of uric acid, a waste product made by the body. Uric acid forms crystals that collect in the joints. The immune response to these crystals brings on symptoms of joint pain and swelling. This is called a gout attack. Often, medications and diet changes are combined to manage gout. Below are some guidelines for changing your diet to help you manage gout and prevent attacks. Your health care provider will help you determine the best eating plan for you.     Eating to manage gout  Weight loss for those who are overweight may help reduce gout attacks.  Eat less of these foods  Eating too many foods containing purines may raise the levels of uric acid in your body. This raises your risk for a gout attack. Try to limit these foods and drinks:  · Alcohol, such as beer and red wine. You may be told to avoid alcohol completely.  · Soft drinks that contain sugar or high fructose corn syrup  · Certain fish, including anchovies, sardines, fish eggs, and herring  · Shellfish  · Certain meats, such as red meat, hot dogs, luncheon meats, and turkey  · Organ meats, such as liver, kidneys, and sweetbreads  · Legumes, such as dried beans and peas  · Other high fat foods such as gravy, whole milk, and high fat cheeses  · Vegetables such as asparagus, cauliflower, spinach, and mushrooms used to be thought to contribute to an increased risk for a gout attack, but recent studies show that high purine vegetables don't increase the risk for a gout attack.  Eat more  of these foods  Other foods may be helpful for people with gout. Add some of these foods to your diet:  · Cherries contain chemicals that may lower uric acid.  · Omega fatty acids. These are found in some fatty fish such as salmon, certain oils (flax, olive, or nut), and nuts themselves. Omega fatty acids may help prevent inflammation due to gout.  · Dairy products that are low-fat or fat-free, such as cheese and yogurt  · Complex carbohydrate foods, including whole grains, brown rice, oats, and beans  · Coffee, in moderation  · Water, approximately 64 ounces per day  Follow-up care  Follow up with your healthcare provider as advised.  When to seek medical advice  Call your healthcare provider right away if any of these occur:  · Return of gout symptoms, usually at night:  · Severe pain, swelling, and heat in a joint, especially the base of the big toe  · Affected joint is hard to move  · Skin of the affected joint is purple or red  · Fever of 100.4°F (38°C) or higher  · Pain that doesn't get better even with prescribed medicine   Date Last Reviewed: 1/12/2016  © 7048-9820 [employer]. [address] [place] [address]. All rights reserved. This information is not intended as a substitute for professional medical care. Always follow your healthcare professional's instructions.        Eating to Prevent Gout  Gout is a painful form of arthritis caused by an excess of uric acid. This is a waste product made by the body. It builds up in the body and forms crystals that collect in the joints, bringing on a gout attack. Alcohol and certain foods can trigger a gout attack. Below are some guidelines for changing your diet to help you manage gout. Your healthcare provider can work with you to determine the best eating plan for you. Know that diet is only one part of managing gout. Take your medicines as prescribed and follow the other guidelines your healthcare provider has given you.  Foods to  limit  Eating too many foods containing purines may increase the levels of uric acid in your body and increase your risk for a gout attack. It may be best to limit these high-purine foods:  · Alcohol (beer, red wine). You may be told to avoid alcohol completely.  · Certain fish (anchovies, sardines, fish roes, herring, tuna, mussels, codfish, scallops, trout, and chidi)  · Certain meats (red meat, processed meat, kimball, turkey, wild game, and goose)  · Sauces and gravies made with meat  · Organ meats (such as liver, kidneys, sweetbreads, and tripe)  · Legumes (such as dried beans, peas)  · Mushrooms, spinach, asparagus, and cauliflower  · Yeast and yeast extract supplements  Foods to try  Some foods may be helpful for people with gout. You may want to try adding some of the following foods to your diet:  · Dark berries: These include blueberries, blackberries, and cherries. These berries contain chemicals that may lower uric acid.  · Tofu: Tofu, which is made from soy, is a good source of protein. Studies have shown that it may be a better choice than meat for people with gout.  · Omega fatty acids: These acids are found in fatty fish (such as salmon), certain oils (such as flax, olive, or nut oils), or nuts. They may help prevent inflammation due to gout.  The following guidelines are recommended by the American Medical Association for people with gout. Your diet should be:  · High in fiber, whole grains, fruits, and vegetables.  · Low in protein (15% of calories should come from protein. Choose lean sources such as soy, lean meats, and poultry).  · Low in fat (no more than 30% of calories should come from fat, with only 10% coming from animal fat).   Date Last Reviewed: 6/17/2015  © 0436-7305 [employer]. [address] [place] [address], [place] [address]. All rights reserved. This information is not intended as a substitute for professional medical care. Always follow your healthcare professional's  "instructions.        What Is Gout?  Gout is a disease that affects the joints. Left untreated, it can lead to painful foot and joint deformities and even kidney problems. But, by treating gout early, you can relieve pain and help prevent future problems. Gout can usually be treated with medication and proper diet. In severe cases, surgery may be needed.  What causes gout?  Gout is caused by an excess of uric acid (a waste product made by the body). Uric acid is excreted by the kidneys. If the uric acid level in your blood rises too high, the uric acid may form crystals that collect in the joints, bringing on a gout attack. If you have many gout attacks, crystals may form large deposits called tophi. Tophi can damage joints and cause deformity.  Who is at risk for gout?  Men are more likely to have gout than women. But women can also be affected, mostly after menopause. Some health problems, such as obesity and high cholesterol, make gout more likely. And some medications, such as diuretics (water pills), can trigger a gout attack. People who drink a lot of alcohol are at high risk for gout. Certain foods can also trigger a gout attack.  Substances that may trigger a gout attack  To help prevent a gout attack, avoid these foods:  · Alcohol (particularly beer, but also red wine and spirits)  · Certain meats (red meat, processed meat, turkey)  · Organ meats (kidney, liver, sweetbread)  · Shellfish (lobster, crab, shrimp, scallop, mussel)  · Certain fish (anchovy, sardine, herring, mackerel)   Treatment  · Lifestyle changes, including weight loss, exercise, and quitting tobacco use  · Reducing consumption of the food groups above as well as high fructose corn syrup, found in many foods including sodas and energy drinks  · Changing non-essential medications that may contribute to gout (such as thiazide diuretics--"water pills")  · Medications to reduce the amount of uric acid in the blood, such as allopurinol or " others  · Medications to treat acute gout attacks, including NSAIDs (nonsteroidal anti-inflammatory medicines), steroids, and colchicine  Date Last Reviewed: 2/1/2016  © 2949-3307 [employer]. [address] [place] [address], [place] [address]. All rights reserved. This information is not intended as a substitute for professional medical care. Always follow your healthcare professional's instructions.

## 2018-01-03 ENCOUNTER — LAB VISIT (OUTPATIENT)
Dept: LAB | Facility: HOSPITAL | Age: 43
End: 2018-01-03
Attending: INTERNAL MEDICINE
Payer: COMMERCIAL

## 2018-01-03 DIAGNOSIS — M10.9 GOUT, ARTHRITIS: ICD-10-CM

## 2018-01-03 LAB
ALBUMIN SERPL BCP-MCNC: 4.2 G/DL
ALP SERPL-CCNC: 51 U/L
ALT SERPL W/O P-5'-P-CCNC: 20 U/L
ANION GAP SERPL CALC-SCNC: 8 MMOL/L
AST SERPL-CCNC: 19 U/L
BASOPHILS # BLD AUTO: 0.02 K/UL
BASOPHILS NFR BLD: 0.4 %
BILIRUB SERPL-MCNC: 0.3 MG/DL
BUN SERPL-MCNC: 13 MG/DL
CALCIUM SERPL-MCNC: 9.1 MG/DL
CHLORIDE SERPL-SCNC: 110 MMOL/L
CO2 SERPL-SCNC: 26 MMOL/L
CREAT SERPL-MCNC: 1 MG/DL
CRP SERPL-MCNC: 1.3 MG/L
DIFFERENTIAL METHOD: ABNORMAL
EOSINOPHIL # BLD AUTO: 0.3 K/UL
EOSINOPHIL NFR BLD: 5.4 %
ERYTHROCYTE [DISTWIDTH] IN BLOOD BY AUTOMATED COUNT: 15.4 %
ERYTHROCYTE [SEDIMENTATION RATE] IN BLOOD BY WESTERGREN METHOD: 0 MM/HR
EST. GFR  (AFRICAN AMERICAN): >60 ML/MIN/1.73 M^2
EST. GFR  (NON AFRICAN AMERICAN): >60 ML/MIN/1.73 M^2
GLUCOSE SERPL-MCNC: 97 MG/DL
HCT VFR BLD AUTO: 43.4 %
HGB BLD-MCNC: 14.6 G/DL
LYMPHOCYTES # BLD AUTO: 2.4 K/UL
LYMPHOCYTES NFR BLD: 42.6 %
MCH RBC QN AUTO: 29.2 PG
MCHC RBC AUTO-ENTMCNC: 33.6 G/DL
MCV RBC AUTO: 87 FL
MONOCYTES # BLD AUTO: 0.5 K/UL
MONOCYTES NFR BLD: 8.8 %
NEUTROPHILS # BLD AUTO: 2.4 K/UL
NEUTROPHILS NFR BLD: 42.3 %
PLATELET # BLD AUTO: 257 K/UL
PMV BLD AUTO: 9.2 FL
POTASSIUM SERPL-SCNC: 3.9 MMOL/L
PROT SERPL-MCNC: 6.5 G/DL
RBC # BLD AUTO: 5 M/UL
SODIUM SERPL-SCNC: 144 MMOL/L
URATE SERPL-MCNC: 4.2 MG/DL
WBC # BLD AUTO: 5.7 K/UL

## 2018-01-03 PROCEDURE — 85651 RBC SED RATE NONAUTOMATED: CPT

## 2018-01-03 PROCEDURE — 86140 C-REACTIVE PROTEIN: CPT

## 2018-01-03 PROCEDURE — 85025 COMPLETE CBC W/AUTO DIFF WBC: CPT

## 2018-01-03 PROCEDURE — 84550 ASSAY OF BLOOD/URIC ACID: CPT

## 2018-01-03 PROCEDURE — 80053 COMPREHEN METABOLIC PANEL: CPT

## 2018-01-03 PROCEDURE — 36415 COLL VENOUS BLD VENIPUNCTURE: CPT

## 2018-01-19 ENCOUNTER — TELEPHONE (OUTPATIENT)
Dept: INTERNAL MEDICINE | Facility: CLINIC | Age: 43
End: 2018-01-19

## 2018-01-19 NOTE — TELEPHONE ENCOUNTER
----- Message from Snow Tavarez sent at 1/19/2018  8:52 AM CST -----  Contact: Self  Pt is calling to be scheduled for an appt for a weird numbness in his right foot he's had x1 week.   was unable to make an appt for anytime soon, so he is requesting Staff contact him for an appt.    He can be reached at [phone].    Thank you.

## 2018-02-05 ENCOUNTER — OFFICE VISIT (OUTPATIENT)
Dept: INTERNAL MEDICINE | Facility: CLINIC | Age: 43
End: 2018-02-05
Payer: COMMERCIAL

## 2018-02-05 ENCOUNTER — HOSPITAL ENCOUNTER (OUTPATIENT)
Dept: RADIOLOGY | Facility: HOSPITAL | Age: 43
Discharge: HOME | End: 2018-02-05
Attending: INTERNAL MEDICINE
Payer: COMMERCIAL

## 2018-02-05 VITALS
DIASTOLIC BLOOD PRESSURE: 68 MMHG | SYSTOLIC BLOOD PRESSURE: 112 MMHG | BODY MASS INDEX: 24.16 KG/M2 | HEART RATE: 62 BPM | HEIGHT: 72 IN | WEIGHT: 178.38 LBS

## 2018-02-05 DIAGNOSIS — R20.0 NUMBNESS OF FOOT: ICD-10-CM

## 2018-02-05 DIAGNOSIS — R20.0 NUMBNESS OF FOOT: Primary | ICD-10-CM

## 2018-02-05 PROCEDURE — 99999 PR PBB SHADOW E&M-EST. PATIENT-LVL III: CPT | Mod: PBBFAC,,, | Performed by: INTERNAL MEDICINE

## 2018-02-05 PROCEDURE — 99213 OFFICE O/P EST LOW 20 MIN: CPT | Mod: S$GLB,,, | Performed by: INTERNAL MEDICINE

## 2018-02-05 PROCEDURE — 73650 X-RAY EXAM OF HEEL: CPT | Mod: TC,RT

## 2018-02-05 PROCEDURE — 73650 X-RAY EXAM OF HEEL: CPT | Mod: 26,RT,, | Performed by: RADIOLOGY

## 2018-02-05 PROCEDURE — 3008F BODY MASS INDEX DOCD: CPT | Mod: S$GLB,,, | Performed by: INTERNAL MEDICINE

## 2018-02-05 NOTE — PROGRESS NOTES
CHIEF COMPLAINT:  Right heel numbness.    HISTORY OF PRESENT ILLNESS:  The patient is a 42-year-old gentleman who is   currently being treated for gout, who comes in today with complaints of numbness   involving the right heel symptoms first occurred on 01/12/2018.  He was   watching some play basketball when it felt like he had something in his shoe,   like something was sticking him, later on that night, it actually began to feel   numb.  This state feeling that way, it is more in the lateral aspect of the   heel.  He did research allopurinol and was concerned that this might be a side   effect with that medication.    REVIEW OF SYSTEMS:  Please see the patient entered review of systems.  He does   report maybe little bit of weight gain.  No problems with chest pain or   shortness of breath.  He does play softball on occasion.  He has not had a gout   flare-up in quite some time.  He cannot recall the last time he had a problem   with gout.    PHYSICAL EXAMINATION:  GENERAL APPEARANCE:  No acute distress.  HEENT:  Conjunctivae clear.  Pupils are equal and reactive.  TMs were clear.    Nasal septum was midline without discharge.  Oropharynx is without erythema.  NECK:  Trachea is midline without JVD.  PULMONARY:  Good inspiratory, expiratory breath sounds are heard.  Lungs are   clear to auscultation.  CARDIOVASCULAR:  S1, S2.  EXTREMITIES:  Without edema.  ABDOMEN:  Nontender, nondistended, without hepatosplenomegaly.  NEUROLOGIC:  Cranial nerves II through XII were intact and equal bilaterally.    , upper extremity, lower extremity motor strength was 5/5.  The patient had   2+ deep tendon reflexes of the biceps and knees bilaterally.  The patient was   able to dorsi, plantar, invert, regine both feet without any difficulty.  A   monofilament exam was performed.  The patient appeared to have equal sensation   in both feet; however, on manual manipulation of the foot, the patient did   report that the right  lateral aspect of the heel did feel little bit off on the   right foot.    ASSESSMENT:  1.  Right heel numbness.  2.  Gout, currently stable.    PLAN:  The patient's CBC, CMP, CRP, ESR from 01/03/2018 reviewed with him.  All   these were normal.  We will see about getting an x-ray of his foot.  He is to   follow up pending results.      JDS/IN  dd: 02/05/2018 07:39:28 (CST)  td: 02/05/2018 23:59:00 (CST)  Doc ID   #[id number]  Job ID #[id number]    CC:     Answers for HPI/ROS submitted by the patient on 2/3/2018   activity change: No  unexpected weight change: No  neck pain: No  hearing loss: No  rhinorrhea: No  trouble swallowing: No  eye discharge: No  visual disturbance: No  chest tightness: No  wheezing: No  chest pain: No  palpitations: No  blood in stool: No  constipation: No  vomiting: No  diarrhea: No  polydipsia: No  polyuria: No  difficulty urinating: No  urgency: No  hematuria: No  joint swelling: No  arthralgias: No  headaches: No  weakness: No  confusion: No  dysphoric mood: No

## 2018-04-20 DIAGNOSIS — M10.9 GOUT, ARTHRITIS: ICD-10-CM

## 2018-04-20 RX ORDER — ALLOPURINOL 100 MG/1
200 TABLET ORAL DAILY
Qty: 180 TABLET | Refills: 1 | Status: SHIPPED | OUTPATIENT
Start: 2018-04-20 | End: 2018-04-23 | Stop reason: SDUPTHER

## 2018-04-23 DIAGNOSIS — M10.9 GOUT, ARTHRITIS: ICD-10-CM

## 2018-04-23 RX ORDER — ALLOPURINOL 100 MG/1
200 TABLET ORAL DAILY
Qty: 180 TABLET | Refills: 1 | Status: SHIPPED | OUTPATIENT
Start: 2018-04-23 | End: 2019-05-07 | Stop reason: SDUPTHER

## 2018-07-22 NOTE — PROGRESS NOTES
Subjective:       Patient ID: Jarod Arias Sr. is a 42 y.o. male with gout    Chief Complaint: No chief complaint on file.    HPI   42 yr old man seen initially on 11/29/17 for joint pain and swelling c/w gout.  He was begun on allopurinol that was increased to 200 mg/d & his last UA level was 4.2. He would like to taper to 100 mg/d as he is doing very well and has not had to use colchicine. He states that a few times he had twinges of pain in his R big toe that went away fast. He thinks maybe this was psychologically instigated. He has not had any renal calculi but found out that his dad also had a single episode of gout years ago, but is not on any treatment.  He has not had any problems tolerating allopurinol.  Was sticking to a diet until this summer, when he put on some of the weight he had lost.     Pertinent hx  On Aug 17, 2017 felt like he stubbed his R big toe. It became sore, red & painful to touch & swollen.  Saw Dr. Velazquez and she felt it was gout.  He was given information and prescribed indomethacin but he did not take it. It resolved wi 2 days on its own.    Has subsequently had small attacks always in his R toe --4-5 episodes characterized mostly by soreness, but he was able to walk. These tended to resolve on their own within 1 day.  In October had an attack that lasted a bit longer and also had an episode on 11/13 that took 2 days to resolve. Ibuprofen 400 mg helped.  Was started on allopurinol 100 mg/d about 1 month ago.   Used to drink once a week but over the summer drank more. He drank twice a week anywhere from 2-4 beers at a time. No homemade liquor. Had also gotten heavy and had poor eating habits. Now has been paying attention to his diet and has lost at least 20 lbs in 3 months. No personal hx of renal calculi. No Pb exposure. No renal calculi.   Has a maternal uncle that probably has gout.  No family hx of kidney stones.           Current Outpatient Prescriptions   Medication Sig  Dispense Refill    allopurinol (ZYLOPRIM) 100 MG tablet Take 2 tablets (200 mg total) by mouth once daily. 180 tablet 1    alprazolam (XANAX) 0.5 MG tablet Take 1 tablet (0.5 mg total) by mouth daily as needed for Anxiety. 10 tablet 0    CETIRIZINE HCL (ZYRTEC ORAL) Take by mouth.      colchicine 0.6 mg tablet Take one twice daily; to abort an acute attack take 2 immediately, followed by 1 one hour laters. 70 tablet 3     No current facility-administered medications for this visit.        Review of patient's allergies indicates:   Allergen Reactions    No known drug allergies        Past Medical History:   Diagnosis Date    Allergy     Elevated blood pressure reading without diagnosis of hypertension     Gout     Left wrist fracture      Past Surgical History:   Procedure Laterality Date    PILONIDAL CYST DRAINAGE      WISDOM TOOTH EXTRACTION             Review of Systems   Constitutional: Negative.  Negative for fatigue, fever and unexpected weight change.   HENT: Negative.  Negative for hearing loss, mouth sores, sore throat, tinnitus and trouble swallowing.    Eyes: Negative.  Negative for redness and visual disturbance.   Respiratory: Negative.  Negative for cough, choking, chest tightness and shortness of breath.    Cardiovascular: Negative.  Negative for chest pain, palpitations and leg swelling.   Gastrointestinal: Negative.  Negative for abdominal pain, blood in stool, constipation, diarrhea, nausea and vomiting.   Genitourinary: Negative.  Negative for frequency, genital sores, hematuria and urgency.   Musculoskeletal: Negative.  Negative for back pain, myalgias, neck pain and neck stiffness.   Skin: Negative.  Negative for rash.   Neurological: Negative.  Negative for dizziness, syncope, numbness and headaches.   Hematological: Does not bruise/bleed easily.   Psychiatric/Behavioral: Negative.  Negative for dysphoric mood and sleep disturbance. The patient is not nervous/anxious.        Family  "History   Problem Relation Age of Onset    Cancer Maternal Grandmother 65        Lung    Cancer Maternal Grandfather      M:  at 60: COPD;   F: 67: HBP, CAD,  Maternal uncle with probable gout.   Father w single episode of gout.  1 S: A& W  14 yr & 11 yr old sons in good health.   Social History   Substance Use Topics    Smoking status: Never Smoker    Smokeless tobacco: Former User    Alcohol use No   Is an .  Plays in softball league intermittently.  No formal exercise, but is intending to exercise.  Objective:  /76   Pulse 67   Ht 5' 10.75" (1.797 m)   Wt 83.9 kg (185 lb)   BMI 25.98 kg/m²        Physical Exam   Vitals reviewed.  Constitutional: He is oriented to person, place, and time and well-developed, well-nourished, and in no distress. No distress.   HENT:   Head: Normocephalic and atraumatic.   Mouth/Throat: Oropharynx is clear and moist. No oropharyngeal exudate.   No facial rashes  Parotids not enlarged  No oral ulcers   Eyes: Conjunctivae and EOM are normal. Pupils are equal, round, and reactive to light. Right eye exhibits no discharge. Left eye exhibits no discharge. No scleral icterus.   Neck: Neck supple. No JVD present. No tracheal deviation present. No thyromegaly present.   Cardiovascular: Normal rate, regular rhythm, normal heart sounds and intact distal pulses.  Exam reveals no gallop and no friction rub.    No murmur heard.  Pulmonary/Chest: Effort normal and breath sounds normal. No respiratory distress. He has no wheezes. He has no rales. He exhibits no tenderness.   Abdominal: Soft. Bowel sounds are normal. He exhibits no distension and no mass. There is no splenomegaly or hepatomegaly. There is no tenderness. There is no rebound and no guarding.   Lymphadenopathy:     He has no cervical adenopathy.        Right: No inguinal adenopathy present.        Left: No inguinal adenopathy present.   Neurological: He is alert and oriented to person, place, and time. He has " normal reflexes. No cranial nerve deficit. Gait normal.   Proximal and distal muscle strength 5/5.   Skin: Skin is warm and dry. No rash noted. He is not diaphoretic.     Psychiatric: Mood, memory, affect and judgment normal.   Musculoskeletal: Normal range of motion. He exhibits no edema or tenderness.   Cspine FROM no tenderness  Tspine FROM no tenderness  Lspine FROM no tenderness.  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  We will drop further if we can.  He will contact us if he has any problems and will contact us after labs are drawn.  Encouraged exercise.     RTC 1 year or prn.

## 2018-07-25 ENCOUNTER — OFFICE VISIT (OUTPATIENT)
Dept: RHEUMATOLOGY | Facility: CLINIC | Age: 43
End: 2018-07-25
Payer: COMMERCIAL

## 2018-07-25 ENCOUNTER — LAB VISIT (OUTPATIENT)
Dept: LAB | Facility: HOSPITAL | Age: 43
End: 2018-07-25
Attending: INTERNAL MEDICINE
Payer: COMMERCIAL

## 2018-07-25 VITALS
BODY MASS INDEX: 25.9 KG/M2 | WEIGHT: 185 LBS | DIASTOLIC BLOOD PRESSURE: 76 MMHG | HEIGHT: 71 IN | SYSTOLIC BLOOD PRESSURE: 117 MMHG | HEART RATE: 67 BPM

## 2018-07-25 DIAGNOSIS — Z79.899 ENCOUNTER FOR LONG-TERM CURRENT USE OF MEDICATION: ICD-10-CM

## 2018-07-25 DIAGNOSIS — M10.9 GOUT, ARTHRITIS: ICD-10-CM

## 2018-07-25 DIAGNOSIS — M10.9 GOUT, ARTHRITIS: Primary | ICD-10-CM

## 2018-07-25 LAB — URATE SERPL-MCNC: 5 MG/DL

## 2018-07-25 PROCEDURE — 99214 OFFICE O/P EST MOD 30 MIN: CPT | Mod: S$GLB,,, | Performed by: INTERNAL MEDICINE

## 2018-07-25 PROCEDURE — 36415 COLL VENOUS BLD VENIPUNCTURE: CPT

## 2018-07-25 PROCEDURE — 99999 PR PBB SHADOW E&M-EST. PATIENT-LVL III: CPT | Mod: PBBFAC,,, | Performed by: INTERNAL MEDICINE

## 2018-07-25 PROCEDURE — 3008F BODY MASS INDEX DOCD: CPT | Mod: CPTII,S$GLB,, | Performed by: INTERNAL MEDICINE

## 2018-07-25 PROCEDURE — 84550 ASSAY OF BLOOD/URIC ACID: CPT

## 2018-07-25 ASSESSMENT — ROUTINE ASSESSMENT OF PATIENT INDEX DATA (RAPID3)
MDHAQ FUNCTION SCORE: 0
PAIN SCORE: 0
PATIENT GLOBAL ASSESSMENT SCORE: 0
AM STIFFNESS SCORE: 0, NO
PSYCHOLOGICAL DISTRESS SCORE: 0
FATIGUE SCORE: 1
TOTAL RAPID3 SCORE: 0

## 2018-07-25 NOTE — PROGRESS NOTES
Rapid3 Question Responses and Scores 7/24/2018   MDHAQ Score 0   Psychologic Score 0   Pain Score 0   When you awakened in the morning OVER THE LAST WEEK, did you feel stiff? No   Fatigue Score 1   Global Health Score 0   RAPID3 Score 0

## 2018-09-20 ENCOUNTER — OFFICE VISIT (OUTPATIENT)
Dept: INTERNAL MEDICINE | Facility: CLINIC | Age: 43
End: 2018-09-20
Payer: COMMERCIAL

## 2018-09-20 VITALS
HEIGHT: 70 IN | DIASTOLIC BLOOD PRESSURE: 84 MMHG | TEMPERATURE: 98 F | HEART RATE: 83 BPM | WEIGHT: 183.19 LBS | SYSTOLIC BLOOD PRESSURE: 126 MMHG | BODY MASS INDEX: 26.22 KG/M2 | RESPIRATION RATE: 16 BRPM

## 2018-09-20 DIAGNOSIS — J01.90 ACUTE BACTERIAL SINUSITIS: Primary | ICD-10-CM

## 2018-09-20 DIAGNOSIS — B96.89 ACUTE BACTERIAL SINUSITIS: Primary | ICD-10-CM

## 2018-09-20 PROCEDURE — 3008F BODY MASS INDEX DOCD: CPT | Mod: CPTII,S$GLB,, | Performed by: INTERNAL MEDICINE

## 2018-09-20 PROCEDURE — 99213 OFFICE O/P EST LOW 20 MIN: CPT | Mod: S$GLB,,, | Performed by: INTERNAL MEDICINE

## 2018-09-20 PROCEDURE — 99999 PR PBB SHADOW E&M-EST. PATIENT-LVL III: CPT | Mod: PBBFAC,,, | Performed by: INTERNAL MEDICINE

## 2018-09-20 RX ORDER — AMOXICILLIN AND CLAVULANATE POTASSIUM 875; 125 MG/1; MG/1
1 TABLET, FILM COATED ORAL EVERY 12 HOURS
Qty: 10 TABLET | Refills: 0 | Status: SHIPPED | OUTPATIENT
Start: 2018-09-20 | End: 2018-09-25

## 2018-09-20 NOTE — PROGRESS NOTES
"Subjective:       Patient ID: Jarod Arias Sr. is a 43 y.o. male.    Chief Complaint: Headache and Sinus Problem    HPI    His symptoms started on Monday. He has sinus pain, ear congestion, headaches. He has taken dayquil and nyquil.  He reports that he generally uses Flonase but stops whenever he gets sick.  He had sweats.  He has significant frontal headache.    Review of Systems   Constitutional: Positive for chills and diaphoresis.   HENT: Positive for ear pain, postnasal drip, sinus pressure and sinus pain.    Respiratory: Negative for cough and shortness of breath.    Neurological: Positive for headaches.       Objective:        Vitals:    09/20/18 1111   BP: 126/84   Pulse: 83   Resp: 16   Temp: 98.3 °F (36.8 °C)   TempSrc: Oral   Weight: 83.1 kg (183 lb 3.2 oz)   Height: 5' 10" (1.778 m)       Body mass index is 26.29 kg/m².    Physical Exam   Constitutional: He appears well-developed and well-nourished.   HENT:   Head: Normocephalic and atraumatic.   Right Ear: Tympanic membrane and external ear normal.   Left Ear: Tympanic membrane and external ear normal.   Nose: Right sinus exhibits maxillary sinus tenderness and frontal sinus tenderness. Left sinus exhibits maxillary sinus tenderness and frontal sinus tenderness.   Mouth/Throat: Oropharyngeal exudate and posterior oropharyngeal erythema present. No tonsillar exudate.   Eyes: Conjunctivae and EOM are normal.   Cardiovascular: Normal rate, regular rhythm, normal heart sounds and intact distal pulses.   Pulmonary/Chest: Effort normal and breath sounds normal. No respiratory distress. He has no wheezes.   Lymphadenopathy:     He has no cervical adenopathy.   Skin: Skin is warm and dry.   Psychiatric: He has a normal mood and affect.       Assessment:     1. Acute bacterial sinusitis           Plan:         1. Acute bacterial sinusitis  Have discussed with him that I recommend he resumes Flonase and has a 24 hr antihistamine.  He can use Sudafed as " needed for congestion. Add nasal saline rinse to help with congestion. Over-the-counter NSAIDs as needed for pain relief.  - amoxicillin-clavulanate 875-125mg (AUGMENTIN) 875-125 mg per tablet; Take 1 tablet by mouth every 12 (twelve) hours. for 5 days  Dispense: 10 tablet; Refill: 0           Patient note was created using MModal Dictation.  Any errors in syntax or even information may not have been identified and edited on initial review prior to signing this note.

## 2018-10-24 DIAGNOSIS — M10.9 GOUT, ARTHRITIS: ICD-10-CM

## 2018-10-24 RX ORDER — ALLOPURINOL 100 MG/1
TABLET ORAL
Qty: 180 TABLET | Refills: 0 | Status: SHIPPED | OUTPATIENT
Start: 2018-10-24 | End: 2019-07-24 | Stop reason: SDUPTHER

## 2019-01-25 ENCOUNTER — IMMUNIZATION (OUTPATIENT)
Dept: INTERNAL MEDICINE | Facility: CLINIC | Age: 44
End: 2019-01-25
Payer: COMMERCIAL

## 2019-01-25 PROCEDURE — 90686 IIV4 VACC NO PRSV 0.5 ML IM: CPT | Mod: S$GLB,,, | Performed by: INTERNAL MEDICINE

## 2019-01-25 PROCEDURE — 90471 FLU VACCINE (QUAD) GREATER THAN OR EQUAL TO 3YO PRESERVATIVE FREE IM: ICD-10-PCS | Mod: S$GLB,,, | Performed by: INTERNAL MEDICINE

## 2019-01-25 PROCEDURE — 90686 FLU VACCINE (QUAD) GREATER THAN OR EQUAL TO 3YO PRESERVATIVE FREE IM: ICD-10-PCS | Mod: S$GLB,,, | Performed by: INTERNAL MEDICINE

## 2019-01-25 PROCEDURE — 90471 IMMUNIZATION ADMIN: CPT | Mod: S$GLB,,, | Performed by: INTERNAL MEDICINE

## 2019-02-05 RX ORDER — ALPRAZOLAM 0.5 MG/1
0.5 TABLET ORAL DAILY PRN
Qty: 10 TABLET | Refills: 0 | Status: SHIPPED | OUTPATIENT
Start: 2019-02-05 | End: 2021-09-21

## 2019-04-25 ENCOUNTER — TELEPHONE (OUTPATIENT)
Dept: ADMINISTRATIVE | Facility: HOSPITAL | Age: 44
End: 2019-04-25

## 2019-04-25 ENCOUNTER — PATIENT OUTREACH (OUTPATIENT)
Dept: ADMINISTRATIVE | Facility: HOSPITAL | Age: 44
End: 2019-04-25

## 2019-04-25 DIAGNOSIS — Z00.00 ANNUAL PHYSICAL EXAM: Primary | ICD-10-CM

## 2019-04-25 DIAGNOSIS — Z12.5 SCREENING FOR PROSTATE CANCER: ICD-10-CM

## 2019-04-25 NOTE — PROGRESS NOTES
Health Maintenance Due   Topic Date Due    TETANUS VACCINE  08/16/1993     Pre-visit audit complete.

## 2019-05-03 ENCOUNTER — LAB VISIT (OUTPATIENT)
Dept: LAB | Facility: HOSPITAL | Age: 44
End: 2019-05-03
Attending: INTERNAL MEDICINE
Payer: COMMERCIAL

## 2019-05-03 DIAGNOSIS — Z12.5 SCREENING FOR PROSTATE CANCER: ICD-10-CM

## 2019-05-03 DIAGNOSIS — Z00.00 ANNUAL PHYSICAL EXAM: ICD-10-CM

## 2019-05-03 LAB
ALBUMIN SERPL BCP-MCNC: 4.2 G/DL (ref 3.5–5.2)
ALP SERPL-CCNC: 54 U/L (ref 55–135)
ALT SERPL W/O P-5'-P-CCNC: 33 U/L (ref 10–44)
ANION GAP SERPL CALC-SCNC: 7 MMOL/L (ref 8–16)
ANION GAP SERPL CALC-SCNC: 7 MMOL/L (ref 8–16)
AST SERPL-CCNC: 24 U/L (ref 10–40)
BASOPHILS # BLD AUTO: 0.04 K/UL (ref 0–0.2)
BASOPHILS NFR BLD: 0.7 % (ref 0–1.9)
BILIRUB SERPL-MCNC: 0.7 MG/DL (ref 0.1–1)
BUN SERPL-MCNC: 13 MG/DL (ref 6–20)
BUN SERPL-MCNC: 13 MG/DL (ref 6–20)
CALCIUM SERPL-MCNC: 9.7 MG/DL (ref 8.7–10.5)
CALCIUM SERPL-MCNC: 9.7 MG/DL (ref 8.7–10.5)
CHLORIDE SERPL-SCNC: 106 MMOL/L (ref 95–110)
CHLORIDE SERPL-SCNC: 106 MMOL/L (ref 95–110)
CHOLEST SERPL-MCNC: 211 MG/DL (ref 120–199)
CHOLEST/HDLC SERPL: 3.1 {RATIO} (ref 2–5)
CO2 SERPL-SCNC: 29 MMOL/L (ref 23–29)
CO2 SERPL-SCNC: 29 MMOL/L (ref 23–29)
COMPLEXED PSA SERPL-MCNC: 0.23 NG/ML (ref 0–4)
CREAT SERPL-MCNC: 1.2 MG/DL (ref 0.5–1.4)
CREAT SERPL-MCNC: 1.2 MG/DL (ref 0.5–1.4)
DIFFERENTIAL METHOD: ABNORMAL
EOSINOPHIL # BLD AUTO: 0.2 K/UL (ref 0–0.5)
EOSINOPHIL NFR BLD: 3.5 % (ref 0–8)
ERYTHROCYTE [DISTWIDTH] IN BLOOD BY AUTOMATED COUNT: 14.5 % (ref 11.5–14.5)
EST. GFR  (AFRICAN AMERICAN): >60 ML/MIN/1.73 M^2
EST. GFR  (AFRICAN AMERICAN): >60 ML/MIN/1.73 M^2
EST. GFR  (NON AFRICAN AMERICAN): >60 ML/MIN/1.73 M^2
EST. GFR  (NON AFRICAN AMERICAN): >60 ML/MIN/1.73 M^2
GLUCOSE SERPL-MCNC: 96 MG/DL (ref 70–110)
GLUCOSE SERPL-MCNC: 96 MG/DL (ref 70–110)
HCT VFR BLD AUTO: 44.7 % (ref 40–54)
HDLC SERPL-MCNC: 68 MG/DL (ref 40–75)
HDLC SERPL: 32.2 % (ref 20–50)
HGB BLD-MCNC: 15 G/DL (ref 14–18)
IMM GRANULOCYTES # BLD AUTO: 0.06 K/UL (ref 0–0.04)
IMM GRANULOCYTES NFR BLD AUTO: 1.1 % (ref 0–0.5)
LDLC SERPL CALC-MCNC: 126.8 MG/DL (ref 63–159)
LYMPHOCYTES # BLD AUTO: 1.8 K/UL (ref 1–4.8)
LYMPHOCYTES NFR BLD: 31.6 % (ref 18–48)
MCH RBC QN AUTO: 29.5 PG (ref 27–31)
MCHC RBC AUTO-ENTMCNC: 33.6 G/DL (ref 32–36)
MCV RBC AUTO: 88 FL (ref 82–98)
MONOCYTES # BLD AUTO: 0.6 K/UL (ref 0.3–1)
MONOCYTES NFR BLD: 10.5 % (ref 4–15)
NEUTROPHILS # BLD AUTO: 3 K/UL (ref 1.8–7.7)
NEUTROPHILS NFR BLD: 52.6 % (ref 38–73)
NONHDLC SERPL-MCNC: 143 MG/DL
NRBC BLD-RTO: 0 /100 WBC
PLATELET # BLD AUTO: 250 K/UL (ref 150–350)
PMV BLD AUTO: 9 FL (ref 9.2–12.9)
POTASSIUM SERPL-SCNC: 4.4 MMOL/L (ref 3.5–5.1)
POTASSIUM SERPL-SCNC: 4.4 MMOL/L (ref 3.5–5.1)
PROT SERPL-MCNC: 6.9 G/DL (ref 6–8.4)
RBC # BLD AUTO: 5.08 M/UL (ref 4.6–6.2)
SODIUM SERPL-SCNC: 142 MMOL/L (ref 136–145)
SODIUM SERPL-SCNC: 142 MMOL/L (ref 136–145)
TRIGL SERPL-MCNC: 81 MG/DL (ref 30–150)
WBC # BLD AUTO: 5.64 K/UL (ref 3.9–12.7)

## 2019-05-03 PROCEDURE — 80053 COMPREHEN METABOLIC PANEL: CPT

## 2019-05-03 PROCEDURE — 84153 ASSAY OF PSA TOTAL: CPT

## 2019-05-03 PROCEDURE — 36415 COLL VENOUS BLD VENIPUNCTURE: CPT | Mod: PO

## 2019-05-03 PROCEDURE — 80061 LIPID PANEL: CPT

## 2019-05-03 PROCEDURE — 85025 COMPLETE CBC W/AUTO DIFF WBC: CPT

## 2019-05-07 ENCOUNTER — OFFICE VISIT (OUTPATIENT)
Dept: INTERNAL MEDICINE | Facility: CLINIC | Age: 44
End: 2019-05-07
Payer: COMMERCIAL

## 2019-05-07 VITALS
BODY MASS INDEX: 26.16 KG/M2 | SYSTOLIC BLOOD PRESSURE: 114 MMHG | HEIGHT: 72 IN | HEART RATE: 66 BPM | DIASTOLIC BLOOD PRESSURE: 74 MMHG | OXYGEN SATURATION: 97 % | WEIGHT: 193.13 LBS

## 2019-05-07 DIAGNOSIS — Z00.00 ANNUAL PHYSICAL EXAM: Primary | ICD-10-CM

## 2019-05-07 DIAGNOSIS — M10.9 GOUT, UNSPECIFIED CAUSE, UNSPECIFIED CHRONICITY, UNSPECIFIED SITE: ICD-10-CM

## 2019-05-07 PROCEDURE — 99999 PR PBB SHADOW E&M-EST. PATIENT-LVL III: CPT | Mod: PBBFAC,,, | Performed by: INTERNAL MEDICINE

## 2019-05-07 PROCEDURE — 99999 PR PBB SHADOW E&M-EST. PATIENT-LVL III: ICD-10-PCS | Mod: PBBFAC,,, | Performed by: INTERNAL MEDICINE

## 2019-05-07 PROCEDURE — 99396 PREV VISIT EST AGE 40-64: CPT | Mod: S$GLB,,, | Performed by: INTERNAL MEDICINE

## 2019-05-07 PROCEDURE — 99396 PR PREVENTIVE VISIT,EST,40-64: ICD-10-PCS | Mod: S$GLB,,, | Performed by: INTERNAL MEDICINE

## 2019-05-07 RX ORDER — FLUTICASONE PROPIONATE 50 MCG
1 SPRAY, SUSPENSION (ML) NASAL DAILY
COMMUNITY

## 2019-05-08 NOTE — PROGRESS NOTES
CHIEF COMPLAINT:  Physical exam.    HISTORY OF PRESENT ILLNESS:  The patient is a 43-year-old gentleman who is   currently being treated for gout who comes in today for annual exam.  The   patient has no new complaints.  The patient has been free of any symptoms of   gout for the past 1 to 2 years.  He does have a followup or need to schedule a   followup with Dr. Whitman.  He would like to get off the medication if   possible.    REVIEW OF SYSTEMS:  Please see the patient entered review of systems.  In   addition, the patient is not exercising currently, but he and his family have   joined [employer].  They are planning on starting to exercise.  He does play   softball on a weekly basis.    PHYSICAL EXAMINATION:  GENERAL APPEARANCE:  No acute distress.  HEENT:  Conjunctivae clear.  Pupils are equal.  TMs are clear.  Nasal septum is   midline without discharge.  Oropharynx is without erythema.  NECK:  Trachea is midline without JVD, without thyromegaly.  PULMONARY:  Good inspiratory, expiratory breath sounds are heard.  Lungs are   clear to auscultation.  CARDIOVASCULAR:  S1, S2.  2+ carotid pulses without bruits.  EXTREMITIES:  Without edema.  ABDOMEN:  Nontender, nondistended, without hepatosplenomegaly.  GENITOURINARY:  Not performed.  Scrotum and penis were normal.  LYMPHATICS:  No cervical, axillary or inguinal adenopathy.    ASSESSMENT:  1.  Physical exam.  2.  History of gout, currently stable.    PLAN:  Did discuss with the patient about losing weight, routine exercise.  The   patient did have blood tests done prior to today's visit consisting of a CBC,   CMP, lipid panel.  His total cholesterol did come up a little bit to where it   was 211 and LDL was 126.8.      JDS/HN  dd: 05/08/2019 04:36:22 (CDT)  td: 05/08/2019 23:44:14 (CDT)  Doc ID   #[id number]  Job ID #[id number]    CC:     Answers for HPI/ROS submitted by the patient on 5/5/2019   activity change: No  unexpected weight change: No  neck pain:  No  hearing loss: No  rhinorrhea: No  trouble swallowing: No  eye discharge: No  visual disturbance: No  chest tightness: No  wheezing: No  chest pain: No  palpitations: No  blood in stool: No  constipation: No  vomiting: No  diarrhea: No  polydipsia: No  polyuria: No  difficulty urinating: No  urgency: No  hematuria: No  joint swelling: No  arthralgias: No  headaches: No  weakness: No  confusion: No  dysphoric mood: No

## 2019-07-24 ENCOUNTER — LAB VISIT (OUTPATIENT)
Dept: LAB | Facility: HOSPITAL | Age: 44
End: 2019-07-24
Attending: INTERNAL MEDICINE
Payer: COMMERCIAL

## 2019-07-24 ENCOUNTER — OFFICE VISIT (OUTPATIENT)
Dept: RHEUMATOLOGY | Facility: CLINIC | Age: 44
End: 2019-07-24
Payer: COMMERCIAL

## 2019-07-24 VITALS
SYSTOLIC BLOOD PRESSURE: 116 MMHG | WEIGHT: 193.31 LBS | HEIGHT: 71 IN | HEART RATE: 71 BPM | DIASTOLIC BLOOD PRESSURE: 71 MMHG | BODY MASS INDEX: 27.06 KG/M2

## 2019-07-24 DIAGNOSIS — M10.9 GOUT, ARTHRITIS: Primary | ICD-10-CM

## 2019-07-24 DIAGNOSIS — Z79.899 ENCOUNTER FOR LONG-TERM CURRENT USE OF MEDICATION: ICD-10-CM

## 2019-07-24 DIAGNOSIS — M10.9 GOUT, ARTHRITIS: ICD-10-CM

## 2019-07-24 LAB — URATE SERPL-MCNC: 7.6 MG/DL (ref 3.4–7)

## 2019-07-24 PROCEDURE — 3008F PR BODY MASS INDEX (BMI) DOCUMENTED: ICD-10-PCS | Mod: CPTII,S$GLB,, | Performed by: INTERNAL MEDICINE

## 2019-07-24 PROCEDURE — 99999 PR PBB SHADOW E&M-EST. PATIENT-LVL III: CPT | Mod: PBBFAC,,, | Performed by: INTERNAL MEDICINE

## 2019-07-24 PROCEDURE — 36415 COLL VENOUS BLD VENIPUNCTURE: CPT

## 2019-07-24 PROCEDURE — 99999 PR PBB SHADOW E&M-EST. PATIENT-LVL III: ICD-10-PCS | Mod: PBBFAC,,, | Performed by: INTERNAL MEDICINE

## 2019-07-24 PROCEDURE — 99214 PR OFFICE/OUTPT VISIT, EST, LEVL IV, 30-39 MIN: ICD-10-PCS | Mod: S$GLB,,, | Performed by: INTERNAL MEDICINE

## 2019-07-24 PROCEDURE — 99214 OFFICE O/P EST MOD 30 MIN: CPT | Mod: S$GLB,,, | Performed by: INTERNAL MEDICINE

## 2019-07-24 PROCEDURE — 3008F BODY MASS INDEX DOCD: CPT | Mod: CPTII,S$GLB,, | Performed by: INTERNAL MEDICINE

## 2019-07-24 PROCEDURE — 84550 ASSAY OF BLOOD/URIC ACID: CPT

## 2019-07-24 RX ORDER — COLCHICINE 0.6 MG/1
TABLET ORAL
Qty: 30 TABLET | Refills: 1 | Status: SHIPPED | OUTPATIENT
Start: 2019-07-24 | End: 2021-09-21

## 2019-07-24 RX ORDER — ALLOPURINOL 100 MG/1
TABLET ORAL
Qty: 180 TABLET | Refills: 3 | Status: SHIPPED | OUTPATIENT
Start: 2019-07-24 | End: 2019-08-27 | Stop reason: DRUGHIGH

## 2019-07-24 ASSESSMENT — ROUTINE ASSESSMENT OF PATIENT INDEX DATA (RAPID3)
PSYCHOLOGICAL DISTRESS SCORE: 0
FATIGUE SCORE: 0
MDHAQ FUNCTION SCORE: 0
TOTAL RAPID3 SCORE: 0
PATIENT GLOBAL ASSESSMENT SCORE: 0
PAIN SCORE: 0
AM STIFFNESS SCORE: 0, NO

## 2019-07-24 NOTE — PROGRESS NOTES
Answers for HPI/ROS submitted by the patient on 7/23/2019   fever: No  eye redness: No  headaches: No  shortness of breath: No  chest pain: No  trouble swallowing: No  diarrhea: No  constipation: No  unexpected weight change: No  genital sore: No  During the last 3 days, have you had a skin rash?: No  Bruises or bleeds easily: No  cough: No    Subjective:       Patient ID: Jarod Arias SrPercy is a 43 y.o. male with gout    Chief Complaint: No chief complaint on file.    HPI   43 yr old man seen initially on 7/25/18. Has no complaints. No further attacks of gout or other joint pains.   Was running out of allopurinol and has been taking only 100 mg of allopurinol/day over that last 2 months or so. Had labs but did not have UA level done.  One year ago it 5.0 on 200 mg of allopurinol daily. He tolerates allopurinol well and has not had to use colchicine at all but requests a new prescription as it is very old.   He denies renal calculi & has refrained from drinking beer and trying to stick to a diet.     Pertinent hx  On Aug 17, 2017 felt like he stubbed his R big toe. It became sore, red & painful to touch & swollen.  Saw Dr. Velazquez and she felt it was gout.  He was given information and prescribed indomethacin but he did not take it. It resolved wi 2 days on its own.    Has subsequently had small attacks always in his R toe --4-5 episodes characterized mostly by soreness, but he was able to walk. These tended to resolve on their own within 1 day.  In October had an attack that lasted a bit longer and also had an episode on 11/13 that took 2 days to resolve. Ibuprofen 400 mg helped.  Was started on allopurinol 100 mg/d about 1 month ago.   Used to drink once a week but over the summer drank more. He drank twice a week anywhere from 2-4 beers at a time. No homemade liquor. Had also gotten heavy and had poor eating habits. Now has been paying attention to his diet and has lost at least 20 lbs in 3 months. No  personal hx of renal calculi. No Pb exposure. No renal calculi.   Has a maternal uncle that probably has gout.  No family hx of kidney stones.           Current Outpatient Medications   Medication Sig Dispense Refill    allopurinol (ZYLOPRIM) 100 MG tablet TAKE 2 TABLETS(200 MG) BY MOUTH EVERY  tablet 0    ALPRAZolam (XANAX) 0.5 MG tablet Take 1 tablet (0.5 mg total) by mouth daily as needed for Anxiety. 10 tablet 0    colchicine 0.6 mg tablet Take one twice daily; to abort an acute attack take 2 immediately, followed by 1 one hour laters. 70 tablet 3    fluticasone propionate (FLONASE) 50 mcg/actuation nasal spray 1 spray by Each Nare route once daily.       No current facility-administered medications for this visit.        Review of patient's allergies indicates:   Allergen Reactions    No known drug allergies        Past Medical History:   Diagnosis Date    Allergy     Elevated blood pressure reading without diagnosis of hypertension     Gout     Left wrist fracture      Past Surgical History:   Procedure Laterality Date    PILONIDAL CYST DRAINAGE      WISDOM TOOTH EXTRACTION             Review of Systems   Constitutional: Negative.  Negative for fatigue, fever and unexpected weight change.   HENT: Negative.  Negative for hearing loss, mouth sores, sore throat, tinnitus and trouble swallowing.    Eyes: Negative.  Negative for redness and visual disturbance.   Respiratory: Negative.  Negative for cough, choking, chest tightness and shortness of breath.    Cardiovascular: Negative.  Negative for chest pain, palpitations and leg swelling.   Gastrointestinal: Negative.  Negative for abdominal pain, blood in stool, constipation, diarrhea, nausea and vomiting.   Genitourinary: Negative.  Negative for frequency, genital sores, hematuria and urgency.   Musculoskeletal: Negative.  Negative for back pain, myalgias, neck pain and neck stiffness.   Skin: Negative.  Negative for rash.   Neurological:  "Negative.  Negative for dizziness, syncope, numbness and headaches.   Hematological: Does not bruise/bleed easily.   Psychiatric/Behavioral: Negative.  Negative for dysphoric mood and sleep disturbance. The patient is not nervous/anxious.        Family History   Problem Relation Age of Onset    Cancer Maternal Grandmother 65        Lung    Cancer Maternal Grandfather      M:  at 60: COPD;   F: 67: HBP, CAD,  Maternal uncle with probable gout.   Father w single episode of gout.  1 S: A& W  14 yr & 11 yr old sons in good health.     Social History     Tobacco Use    Smoking status: Never Smoker    Smokeless tobacco: Former User   Substance Use Topics    Alcohol use: No     Frequency: 2-4 times a month     Drinks per session: 1 or 2     Binge frequency: Never    Drug use: No   Is an .  Plays in softball league intermittently.  Exercises with his 15 yr old son.  Objective:  /71   Pulse 71   Ht 5' 10.5" (1.791 m)   Wt 87.7 kg (193 lb 5.5 oz)   BMI 27.35 kg/m²        Physical Exam   Vitals reviewed.  Constitutional: He is oriented to person, place, and time and well-developed, well-nourished, and in no distress. No distress.   HENT:   Head: Normocephalic and atraumatic.   Mouth/Throat: Oropharynx is clear and moist. No oropharyngeal exudate.   No facial rashes  Parotids not enlarged  No oral ulcers   Eyes: Conjunctivae and EOM are normal. Pupils are equal, round, and reactive to light. Right eye exhibits no discharge. Left eye exhibits no discharge. No scleral icterus.   Neck: Neck supple. No JVD present. No tracheal deviation present. No thyromegaly present.   Cardiovascular: Normal rate, regular rhythm, normal heart sounds and intact distal pulses.  Exam reveals no gallop and no friction rub.    No murmur heard.  Pulmonary/Chest: Effort normal and breath sounds normal. No respiratory distress. He has no wheezes. He has no rales. He exhibits no tenderness.   Abdominal: Soft. Bowel sounds are " normal. He exhibits no distension and no mass. There is no splenomegaly or hepatomegaly. There is no tenderness. There is no rebound and no guarding.   Lymphadenopathy:     He has no cervical adenopathy.        Right: No inguinal adenopathy present.        Left: No inguinal adenopathy present.   Neurological: He is alert and oriented to person, place, and time. He has normal reflexes. No cranial nerve deficit. Gait normal.   Proximal and distal muscle strength 5/5.   Skin: Skin is warm and dry. No rash noted. He is not diaphoretic.     Psychiatric: Mood, memory, affect and judgment normal.   Musculoskeletal: Normal range of motion. He exhibits no edema or tenderness.   Cspine FROM no tenderness  Tspine FROM no tenderness  Lspine FROM no tenderness.  TMJ: unremarkable  Shoulders: FROM; no synovitis;  Elbows: FROM; no synovitis; no tophi or nodules  Wrists: FROM; no synovitis;    MCPs: FROM; no synovitis; no metacarpalgia;  ok;  PIPs:FROM; no synovitis;   DIPs: FROM; no synovitis;   HIPS: FROM  Knees: FROM; no synovitis; no instability;  Ankles: FROM: no synovitis   Toes: ok; no metatarsalgia;              5/3/19: CBC ok; CMP ok;   7/25/18: 5.0;   1/3/18: ESR 0; CRP 1.3; CBC ok; CMP ok; UA 4.2;   11/9/17: BMP ok;   11/3/17: CBC ok; UA 8.2; LFTs ok;   9/12/17: UA 9.0    2/5/18: R calcaneus: personally reviewed: unremarkable  Assessment:   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  Keep on allopurinol 200 mg/d unless lab says otherwise.   Discussed comorbidities associated with gout.   He will discuss Dr. Schofield following him for gout unless he has a problem.  RTC prn     none

## 2019-07-24 NOTE — PATIENT INSTRUCTIONS
"You need the following labs once a year unless you have a problem"  CBC, CMP and uric acid level.  "

## 2019-08-27 ENCOUNTER — LAB VISIT (OUTPATIENT)
Dept: LAB | Facility: HOSPITAL | Age: 44
End: 2019-08-27
Attending: INTERNAL MEDICINE
Payer: COMMERCIAL

## 2019-08-27 ENCOUNTER — PATIENT MESSAGE (OUTPATIENT)
Dept: RHEUMATOLOGY | Facility: CLINIC | Age: 44
End: 2019-08-27

## 2019-08-27 DIAGNOSIS — M10.9 GOUT, ARTHRITIS: ICD-10-CM

## 2019-08-27 DIAGNOSIS — M10.9 GOUT, ARTHRITIS: Primary | ICD-10-CM

## 2019-08-27 LAB — URATE SERPL-MCNC: 6.5 MG/DL (ref 3.4–7)

## 2019-08-27 PROCEDURE — 84550 ASSAY OF BLOOD/URIC ACID: CPT

## 2019-08-27 PROCEDURE — 36415 COLL VENOUS BLD VENIPUNCTURE: CPT | Mod: PO

## 2019-08-27 RX ORDER — ALLOPURINOL 300 MG/1
300 TABLET ORAL DAILY
Qty: 90 TABLET | Refills: 3 | Status: SHIPPED | OUTPATIENT
Start: 2019-08-27 | End: 2021-01-15 | Stop reason: SDUPTHER

## 2019-10-10 ENCOUNTER — LAB VISIT (OUTPATIENT)
Dept: LAB | Facility: HOSPITAL | Age: 44
End: 2019-10-10
Attending: INTERNAL MEDICINE
Payer: COMMERCIAL

## 2019-10-10 DIAGNOSIS — M10.9 GOUT, ARTHRITIS: ICD-10-CM

## 2019-10-10 LAB — URATE SERPL-MCNC: 4.6 MG/DL (ref 3.4–7)

## 2019-10-10 PROCEDURE — 84550 ASSAY OF BLOOD/URIC ACID: CPT

## 2019-10-10 PROCEDURE — 36415 COLL VENOUS BLD VENIPUNCTURE: CPT | Mod: PO

## 2020-02-06 ENCOUNTER — PATIENT MESSAGE (OUTPATIENT)
Dept: INTERNAL MEDICINE | Facility: CLINIC | Age: 45
End: 2020-02-06

## 2020-02-09 DIAGNOSIS — R09.81 NASAL CONGESTION: Primary | ICD-10-CM

## 2020-02-10 ENCOUNTER — TELEPHONE (OUTPATIENT)
Dept: ADMINISTRATIVE | Facility: OTHER | Age: 45
End: 2020-02-10

## 2020-02-10 ENCOUNTER — TELEPHONE (OUTPATIENT)
Dept: INTERNAL MEDICINE | Facility: CLINIC | Age: 45
End: 2020-02-10

## 2020-02-10 NOTE — TELEPHONE ENCOUNTER
----- Message from Alex Schofield MD sent at 2/9/2020  7:39 AM CST -----  Please contact and refer to Allergy. Referral is in.

## 2020-02-10 NOTE — TELEPHONE ENCOUNTER
Left voice message for patient to return call to schedule appointment from referral to Allergy department.  Ada DE LA FUENTE [phone]

## 2020-02-11 ENCOUNTER — PATIENT MESSAGE (OUTPATIENT)
Dept: INTERNAL MEDICINE | Facility: CLINIC | Age: 45
End: 2020-02-11

## 2020-03-10 ENCOUNTER — PATIENT OUTREACH (OUTPATIENT)
Dept: ADMINISTRATIVE | Facility: OTHER | Age: 45
End: 2020-03-10

## 2020-03-12 ENCOUNTER — LAB VISIT (OUTPATIENT)
Dept: LAB | Facility: HOSPITAL | Age: 45
End: 2020-03-12
Attending: ALLERGY & IMMUNOLOGY
Payer: COMMERCIAL

## 2020-03-12 ENCOUNTER — OFFICE VISIT (OUTPATIENT)
Dept: ALLERGY | Facility: CLINIC | Age: 45
End: 2020-03-12
Payer: COMMERCIAL

## 2020-03-12 VITALS — HEIGHT: 71 IN | BODY MASS INDEX: 27.69 KG/M2 | WEIGHT: 197.75 LBS

## 2020-03-12 DIAGNOSIS — J31.0 CHRONIC RHINITIS: ICD-10-CM

## 2020-03-12 DIAGNOSIS — J31.0 CHRONIC RHINITIS: Primary | ICD-10-CM

## 2020-03-12 DIAGNOSIS — H10.423 SIMPLE CHRONIC CONJUNCTIVITIS OF BOTH EYES: ICD-10-CM

## 2020-03-12 PROCEDURE — 86003 ALLG SPEC IGE CRUDE XTRC EA: CPT | Mod: 59

## 2020-03-12 PROCEDURE — 99999 PR PBB SHADOW E&M-EST. PATIENT-LVL III: CPT | Mod: PBBFAC,,, | Performed by: ALLERGY & IMMUNOLOGY

## 2020-03-12 PROCEDURE — 36415 COLL VENOUS BLD VENIPUNCTURE: CPT | Mod: PO

## 2020-03-12 PROCEDURE — 86003 ALLG SPEC IGE CRUDE XTRC EA: CPT

## 2020-03-12 PROCEDURE — 99999 PR PBB SHADOW E&M-EST. PATIENT-LVL III: ICD-10-PCS | Mod: PBBFAC,,, | Performed by: ALLERGY & IMMUNOLOGY

## 2020-03-12 PROCEDURE — 99244 OFF/OP CNSLTJ NEW/EST MOD 40: CPT | Mod: S$GLB,,, | Performed by: ALLERGY & IMMUNOLOGY

## 2020-03-12 PROCEDURE — 99244 PR OFFICE CONSULTATION,LEVEL IV: ICD-10-PCS | Mod: S$GLB,,, | Performed by: ALLERGY & IMMUNOLOGY

## 2020-03-12 RX ORDER — DIPHENHYDRAMINE HCL 25 MG
25 CAPSULE ORAL EVERY 6 HOURS PRN
COMMUNITY

## 2020-03-12 RX ORDER — CETIRIZINE HYDROCHLORIDE 10 MG/1
10 TABLET ORAL DAILY
COMMUNITY

## 2020-03-12 RX ORDER — PSEUDOEPHEDRINE HCL 30 MG
30 TABLET ORAL EVERY 4 HOURS PRN
COMMUNITY

## 2020-03-12 RX ORDER — AZELASTINE HYDROCHLORIDE 0.5 MG/ML
1 SOLUTION/ DROPS OPHTHALMIC 2 TIMES DAILY PRN
Qty: 6 ML | Refills: 12 | Status: SHIPPED | OUTPATIENT
Start: 2020-03-12 | End: 2021-03-12

## 2020-03-12 RX ORDER — AZELASTINE 1 MG/ML
2 SPRAY, METERED NASAL 2 TIMES DAILY
Qty: 30 ML | Refills: 12 | Status: SHIPPED | OUTPATIENT
Start: 2020-03-12 | End: 2024-03-05

## 2020-03-12 NOTE — PROGRESS NOTES
Subjective:       Patient ID: Jarod Arias Sr. is a 44 y.o. male.    Chief Complaint:  Allergies (itchy watery eyes ); Sinusitis; and Nasal Congestion      43 yo man presents for consult from dr Alex Schofield for allergies. He states he has suffered life long. Last 6 months have been worse. He is always congested, at times nose very stuffed. Has HA and ears feel full. Takes zyrtec daily and occ still has runny nose, sneeze, itchy watery eyes so will rake benadryl as well. occ scratchy itchy throat. No chest symptoms. Has all year but worse in spring. Worse in AM. Worse outside. he has used Flonase and other nasal steroids in past and do help. Never had azelastine or Singulair. He has no H/o asthma, eczema, food, insect or latex allergy. He has gout but no other medical issue. no surgeries. He had allergy test long ago and remembers cockroach, grass and weeds.       Environmental History: see history section for home environment  Review of Systems   Constitutional: Negative for activity change, appetite change, chills, fatigue, fever and unexpected weight change.   HENT: Positive for congestion, ear pain, postnasal drip, rhinorrhea, sinus pressure, sneezing and sore throat. Negative for ear discharge, facial swelling, hearing loss, mouth sores, nosebleeds, tinnitus, trouble swallowing and voice change.    Eyes: Positive for discharge and itching. Negative for redness and visual disturbance.   Respiratory: Positive for cough. Negative for chest tightness, shortness of breath and wheezing.    Cardiovascular: Negative for chest pain, palpitations and leg swelling.   Gastrointestinal: Negative for abdominal distention, abdominal pain, constipation, diarrhea, nausea and vomiting.   Genitourinary: Negative for difficulty urinating.   Musculoskeletal: Negative for arthralgias, back pain, joint swelling and myalgias.   Skin: Negative for color change, pallor and rash.   Neurological: Negative for dizziness, tremors,  speech difficulty, weakness, light-headedness and headaches.   Hematological: Negative for adenopathy. Does not bruise/bleed easily.   Psychiatric/Behavioral: Negative for agitation, confusion, decreased concentration and sleep disturbance. The patient is not nervous/anxious.         Objective:      Physical Exam   Constitutional: He is oriented to person, place, and time. He appears well-developed and well-nourished. No distress.   HENT:   Head: Normocephalic and atraumatic.   Right Ear: Hearing, tympanic membrane, external ear and ear canal normal.   Left Ear: Hearing, tympanic membrane, external ear and ear canal normal.   Nose: No mucosal edema (pink turbinates), rhinorrhea, sinus tenderness or septal deviation. No epistaxis.   Mouth/Throat: Oropharynx is clear and moist and mucous membranes are normal. No uvula swelling.   Eyes: Conjunctivae are normal. Right eye exhibits no discharge. Left eye exhibits no discharge.   Neck: Normal range of motion. No thyromegaly present.   Cardiovascular: Normal rate, regular rhythm and normal heart sounds.   No murmur heard.  Pulmonary/Chest: Effort normal and breath sounds normal. No respiratory distress. He has no wheezes.   Abdominal: Soft. He exhibits no distension. There is no tenderness.   Musculoskeletal: Normal range of motion. He exhibits no edema.   Lymphadenopathy:     He has no cervical adenopathy.   Neurological: He is alert and oriented to person, place, and time. Coordination normal.   Skin: Skin is warm and dry. No rash noted. No erythema.   Psychiatric: He has a normal mood and affect. His behavior is normal. Judgment and thought content normal.   Nursing note and vitals reviewed.      Laboratory:   none performed   Assessment:       1. Chronic rhinitis    2. Simple chronic conjunctivitis of both eyes         Plan:       1. Immunocaps to identify any allergic triggers  2. continue daily H1 blocker, cetirizine 10 mg and can increase to BID as needed  3. Add  azelastine nasal spray 2 SEN BID as needed and azelastine eye drop 1 drop each eye BID as needed  4. Phone review  5. Dr Schofield notified of completed consult via [employer]

## 2020-03-12 NOTE — LETTER
March 12, 2020      Alex Schofield MD  [address]  [facility] [address]           [facility]  [address] [facility] [address] [facility].  [place] [address]  Phone: [phone]          Patient: Jarod Arias Sr.   MR Number: [id number]   YOB: 1975   Date of Visit: 3/12/2020       Dear Dr. Alex Schofield:    Thank you for referring Jarod Arias to me for evaluation. Attached you will find relevant portions of my assessment and plan of care.    If you have questions, please do not hesitate to call me. I look forward to following Jarod Arias along with you.    Sincerely,    Ana Simpson MD    Enclosure  CC:  No Recipients    If you would like to receive this communication electronically, please contact [email or web address] or [phone] to request more information on [email or web address] Link access.    For providers and/or their staff who would like to refer a patient to [facility], please contact us through our one-stop-shop provider referral line, [facility], at [phone].    If you feel you have received this communication in error or would no longer like to receive these types of communications, please e-mail [email or web address]

## 2020-03-16 LAB
A ALTERNATA IGE QN: <0.1 KU/L
A FUMIGATUS IGE QN: <0.1 KU/L
ALLERGEN CHAETOMIUM GLOBOSUM IGE: <0.1 KU/L
ALLERGEN WALNUT TREE IGE: <0.1 KU/L
ALLERGEN WHITE PINE TREE IGE: <0.1 KU/L
BAHIA GRASS IGE QN: 1.34 KU/L
BALD CYPRESS IGE QN: 0.49 KU/L
BERMUDA GRASS IGE QN: 1.37 KU/L
C HERBARUM IGE QN: <0.1 KU/L
C LUNATA IGE QN: <0.1 KU/L
CAT DANDER IGE QN: <0.1 KU/L
CHAETOMIUM GLOB. CLASS: NORMAL
COMMON RAGWEED IGE QN: 0.1 KU/L
COTTONWOOD IGE QN: <0.1 KU/L
D FARINAE IGE QN: 4.7 KU/L
D PTERONYSS IGE QN: 2.68 KU/L
DEPRECATED A ALTERNATA IGE RAST QL: NORMAL
DEPRECATED A FUMIGATUS IGE RAST QL: NORMAL
DEPRECATED BAHIA GRASS IGE RAST QL: ABNORMAL
DEPRECATED BALD CYPRESS IGE RAST QL: ABNORMAL
DEPRECATED BERMUDA GRASS IGE RAST QL: ABNORMAL
DEPRECATED C HERBARUM IGE RAST QL: NORMAL
DEPRECATED C LUNATA IGE RAST QL: NORMAL
DEPRECATED CAT DANDER IGE RAST QL: NORMAL
DEPRECATED COMMON RAGWEED IGE RAST QL: NORMAL
DEPRECATED COTTONWOOD IGE RAST QL: NORMAL
DEPRECATED D FARINAE IGE RAST QL: ABNORMAL
DEPRECATED D PTERONYSS IGE RAST QL: ABNORMAL
DEPRECATED DOG DANDER IGE RAST QL: ABNORMAL
DEPRECATED ELDER IGE RAST QL: NORMAL
DEPRECATED ENGL PLANTAIN IGE RAST QL: NORMAL
DEPRECATED JOHNSON GRASS IGE RAST QL: ABNORMAL
DEPRECATED LONDON PLANE IGE RAST QL: NORMAL
DEPRECATED MUGWORT IGE RAST QL: NORMAL
DEPRECATED PECAN/HICK TREE IGE RAST QL: NORMAL
DEPRECATED ROACH IGE RAST QL: NORMAL
DEPRECATED S ROSTRATA IGE RAST QL: NORMAL
DEPRECATED SALTWORT IGE RAST QL: NORMAL
DEPRECATED SILVER BIRCH IGE RAST QL: NORMAL
DEPRECATED TIMOTHY IGE RAST QL: ABNORMAL
DEPRECATED WHITE OAK IGE RAST QL: NORMAL
DEPRECATED WILLOW IGE RAST QL: NORMAL
DOG DANDER IGE QN: 0.28 KU/L
ELDER IGE QN: <0.1 KU/L
ENGL PLANTAIN IGE QN: <0.1 KU/L
JOHNSON GRASS IGE QN: 1.46 KU/L
LONDON PLANE IGE QN: <0.1 KU/L
MUGWORT IGE QN: <0.1 KU/L
PECAN/HICK TREE IGE QN: <0.1 KU/L
ROACH IGE QN: <0.1 KU/L
S ROSTRATA IGE QN: <0.1 KU/L
SALTWORT IGE QN: <0.1 KU/L
SILVER BIRCH IGE QN: <0.1 KU/L
TIMOTHY IGE QN: 1.56 KU/L
WALNUT TREE CLASS: NORMAL
WHITE OAK IGE QN: <0.1 KU/L
WHITE PINE CLASS: NORMAL
WILLOW IGE QN: <0.1 KU/L

## 2020-03-18 ENCOUNTER — PATIENT MESSAGE (OUTPATIENT)
Dept: ALLERGY | Facility: CLINIC | Age: 45
End: 2020-03-18

## 2020-07-18 ENCOUNTER — OFFICE VISIT (OUTPATIENT)
Dept: URGENT CARE | Facility: CLINIC | Age: 45
End: 2020-07-18
Payer: COMMERCIAL

## 2020-07-18 VITALS
SYSTOLIC BLOOD PRESSURE: 128 MMHG | HEIGHT: 71 IN | OXYGEN SATURATION: 96 % | BODY MASS INDEX: 25.62 KG/M2 | HEART RATE: 94 BPM | DIASTOLIC BLOOD PRESSURE: 85 MMHG | WEIGHT: 183 LBS | TEMPERATURE: 98 F

## 2020-07-18 DIAGNOSIS — S00.212A EYELID ABRASION, LEFT, INITIAL ENCOUNTER: Primary | ICD-10-CM

## 2020-07-18 PROCEDURE — 99213 OFFICE O/P EST LOW 20 MIN: CPT | Mod: S$GLB,,, | Performed by: PHYSICIAN ASSISTANT

## 2020-07-18 PROCEDURE — 99213 PR OFFICE/OUTPT VISIT, EST, LEVL III, 20-29 MIN: ICD-10-PCS | Mod: S$GLB,,, | Performed by: PHYSICIAN ASSISTANT

## 2020-07-18 RX ORDER — ERYTHROMYCIN 5 MG/G
OINTMENT OPHTHALMIC 3 TIMES DAILY
Qty: 1 G | Refills: 0 | Status: SHIPPED | OUTPATIENT
Start: 2020-07-18 | End: 2021-09-21

## 2020-07-18 NOTE — PROGRESS NOTES
"Subjective:       Patient ID: Jarod Arias Sr. is a 44 y.o. male.    Vitals:  height is 5' 10.75" (1.797 m) and weight is 83 kg (183 lb). His temperature is 98.3 °F (36.8 °C). His blood pressure is 128/85 and his pulse is 94. His oxygen saturation is 96%.     Chief Complaint: Eye Problem    Eye Problem   The left eye is affected. This is a new problem. The current episode started today. The problem has been unchanged. The injury mechanism was a foreign body (wood). The patient is experiencing no pain. There is no known exposure to pink eye. He wears contacts. Associated symptoms include a foreign body sensation. Pertinent negatives include no blurred vision, eye discharge, double vision, eye redness, fever, itching, nausea, photophobia, recent URI or vomiting. Treatments tried: flushed, eye drops, pulled out wood. The treatment provided mild relief.       Constitution: Negative for chills and fever.   HENT: Negative for congestion and sinus pain.    Eyes: Negative for eye trauma, foreign body in eye, eye discharge, eye itching, eye pain, eye redness, photophobia, vision loss, double vision, blurred vision and eyelid swelling.   Gastrointestinal: Negative for nausea and vomiting.   Skin: Negative for rash.   Allergic/Immunologic: Negative for seasonal allergies and itching.   Neurological: Negative for headaches.       Objective:      Physical Exam   Constitutional: He is oriented to person, place, and time. He appears well-developed.   HENT:   Head: Normocephalic and atraumatic.   Ears:   Right Ear: External ear normal.   Left Ear: External ear normal.   Nose: Nose normal.   Mouth/Throat: Oropharynx is clear and moist.   Eyes: Pupils are equal, round, and reactive to light. Conjunctivae, EOM and lids are normal. Lids are everted and swept, no foreign bodies found. Right eye exhibits no discharge and no hordeolum. No foreign body present in the right eye. Left eye exhibits no discharge and no hordeolum. No " foreign body present in the left eye. No visual field deficit is present. No scleral icterus. Right eye exhibits normal extraocular motion and no nystagmus. Left eye exhibits normal extraocular motion and no nystagmus.      Comments: No foreign body.  No corneal abrasion, flare, or ulcer.  Abrasion noted to interior aspect of left lower eyelid.  No oozing weeping or drainage.  No hyphema or hypopyon.  eye irrigated.   Neck: Trachea normal, full passive range of motion without pain and phonation normal. Neck supple.   Musculoskeletal: Normal range of motion.   Neurological: He is alert and oriented to person, place, and time.   Skin: Skin is warm, dry and intact. Psychiatric: His speech is normal and behavior is normal. Judgment and thought content normal.   Nursing note and vitals reviewed.        Assessment:       1. Eyelid abrasion, left, initial encounter        Plan:         Eyelid abrasion, left, initial encounter  -     erythromycin (ROMYCIN) ophthalmic ointment; Place into the left eye 3 (three) times daily.  Dispense: 1 g; Refill: 0    Discussed diagnosis with patient as well as treatment and home care. Discussed return to clinic precautions vs ER precautions. All patients questions answered. Patient verbalized understanding. Patient agreed with plan of care.       Home care  · A cold pack (ice in a plastic bag, wrapped in a towel) may be applied over the eye (or eye patch) for 20 minutes at a time, to reduce pain.  · You may use acetaminophen or ibuprofen to control pain, unless another pain medicine was prescribed. Note: If you have chronic liver or kidney disease or ever had a stomach ulcer or GI bleeding, talk with your doctor before using these medicines.  · Rest your eyes and do not read until symptoms are gone.  · If you use contact lenses, do not wear them until all symptoms are gone.  · If your eyes are sensitive to light, try wearing sunglasses, or stay indoors until symptoms go away.  Follow-up  care  Follow up with your health care provider, or as advised.  · If no patch was put on your eye, and used but the pain continues for more than 48 hours, you should have another exam. Return to this facility or contact your health care provider to arrange this.  · If your eye was patched and you were asked to remove the patch yourself, see your health care provider. You may also return to this facility if you still have pain after the patch is removed.  · If you were given a return appointment for patch removal and re-examination, be sure to keep the appointment. Leaving the patch in place longer than advised could be harmful.  When to seek medical advice  Call your health care provider right away if any of these occur.  · Increasing eye pain or pain that does not improve after 24 hours  · Discharge from the eye  · Increasing redness of the eye or swelling of the eyelids  · Worsening vision  · Symptoms that worsen after the abrasion has healed  Date Last Reviewed: 6/14/2015  © 4314-7553 [employer]. [address] [place] [address], [place] [address]. All rights reserved. This information is not intended as a substitute for professional medical care. Always follow your healthcare professional's instructions.      Please follow up with your Primary care provider within 2-5 days if your signs and symptoms have not resolved or worsen.     If your condition worsens or fails to improve we recommend that you receive another evaluation at the emergency room immediately or contact your primary medical clinic to discuss your concerns.   You must understand that you have received an Urgent Care treatment only and that you may be released before all of your medical problems are known or treated. You, the patient, will arrange for follow up care as instructed.     RED FLAGS/WARNING SYMPTOMS DISCUSSED WITH PATIENT THAT WOULD WARRANT EMERGENT MEDICAL ATTENTION. PATIENT VERBALIZED UNDERSTANDING.

## 2020-07-18 NOTE — PATIENT INSTRUCTIONS
Home care  · A cold pack (ice in a plastic bag, wrapped in a towel) may be applied over the eye (or eye patch) for 20 minutes at a time, to reduce pain.  · You may use acetaminophen or ibuprofen to control pain, unless another pain medicine was prescribed. Note: If you have chronic liver or kidney disease or ever had a stomach ulcer or GI bleeding, talk with your doctor before using these medicines.  · Rest your eyes and do not read until symptoms are gone.  · If you use contact lenses, do not wear them until all symptoms are gone.  · If your eyes are sensitive to light, try wearing sunglasses, or stay indoors until symptoms go away.  Follow-up care  Follow up with your health care provider, or as advised.  · If no patch was put on your eye, and used but the pain continues for more than 48 hours, you should have another exam. Return to this facility or contact your health care provider to arrange this.  · If your eye was patched and you were asked to remove the patch yourself, see your health care provider. You may also return to this facility if you still have pain after the patch is removed.  · If you were given a return appointment for patch removal and re-examination, be sure to keep the appointment. Leaving the patch in place longer than advised could be harmful.  When to seek medical advice  Call your health care provider right away if any of these occur.  · Increasing eye pain or pain that does not improve after 24 hours  · Discharge from the eye  · Increasing redness of the eye or swelling of the eyelids  · Worsening vision  · Symptoms that worsen after the abrasion has healed  Date Last Reviewed: 6/14/2015 © 2000-2017 [employer]. [address] [place] [address], [place] [address]. All rights reserved. This information is not intended as a substitute for professional medical care. Always follow your healthcare professional's instructions.      Please follow up with your Primary care provider within  2-5 days if your signs and symptoms have not resolved or worsen.     If your condition worsens or fails to improve we recommend that you receive another evaluation at the emergency room immediately or contact your primary medical clinic to discuss your concerns.   You must understand that you have received an Urgent Care treatment only and that you may be released before all of your medical problems are known or treated. You, the patient, will arrange for follow up care as instructed.     RED FLAGS/WARNING SYMPTOMS DISCUSSED WITH PATIENT THAT WOULD WARRANT EMERGENT MEDICAL ATTENTION. PATIENT VERBALIZED UNDERSTANDING.

## 2020-08-18 ENCOUNTER — OFFICE VISIT (OUTPATIENT)
Dept: INTERNAL MEDICINE | Facility: CLINIC | Age: 45
End: 2020-08-18
Payer: COMMERCIAL

## 2020-08-18 ENCOUNTER — LAB VISIT (OUTPATIENT)
Dept: LAB | Facility: HOSPITAL | Age: 45
End: 2020-08-18
Attending: INTERNAL MEDICINE
Payer: COMMERCIAL

## 2020-08-18 VITALS
OXYGEN SATURATION: 98 % | BODY MASS INDEX: 25.24 KG/M2 | HEIGHT: 71 IN | HEART RATE: 70 BPM | DIASTOLIC BLOOD PRESSURE: 60 MMHG | TEMPERATURE: 98 F | SYSTOLIC BLOOD PRESSURE: 100 MMHG | WEIGHT: 180.31 LBS

## 2020-08-18 DIAGNOSIS — Z00.00 ANNUAL PHYSICAL EXAM: Primary | ICD-10-CM

## 2020-08-18 DIAGNOSIS — M10.9 GOUT, UNSPECIFIED CAUSE, UNSPECIFIED CHRONICITY, UNSPECIFIED SITE: ICD-10-CM

## 2020-08-18 DIAGNOSIS — Z00.00 ANNUAL PHYSICAL EXAM: ICD-10-CM

## 2020-08-18 DIAGNOSIS — Z12.11 COLON CANCER SCREENING: ICD-10-CM

## 2020-08-18 LAB
ALBUMIN SERPL BCP-MCNC: 4.4 G/DL (ref 3.5–5.2)
ALP SERPL-CCNC: 51 U/L (ref 55–135)
ALT SERPL W/O P-5'-P-CCNC: 14 U/L (ref 10–44)
ANION GAP SERPL CALC-SCNC: 8 MMOL/L (ref 8–16)
AST SERPL-CCNC: 20 U/L (ref 10–40)
BASOPHILS # BLD AUTO: 0.04 K/UL (ref 0–0.2)
BASOPHILS NFR BLD: 0.8 % (ref 0–1.9)
BILIRUB SERPL-MCNC: 0.6 MG/DL (ref 0.1–1)
BILIRUB UR QL STRIP: NEGATIVE
BUN SERPL-MCNC: 12 MG/DL (ref 6–20)
CALCIUM SERPL-MCNC: 9.4 MG/DL (ref 8.7–10.5)
CHLORIDE SERPL-SCNC: 107 MMOL/L (ref 95–110)
CHOLEST SERPL-MCNC: 213 MG/DL (ref 120–199)
CHOLEST/HDLC SERPL: 3.8 {RATIO} (ref 2–5)
CLARITY UR REFRACT.AUTO: CLEAR
CO2 SERPL-SCNC: 27 MMOL/L (ref 23–29)
COLOR UR AUTO: YELLOW
CREAT SERPL-MCNC: 1.2 MG/DL (ref 0.5–1.4)
DIFFERENTIAL METHOD: ABNORMAL
EOSINOPHIL # BLD AUTO: 0.2 K/UL (ref 0–0.5)
EOSINOPHIL NFR BLD: 3.8 % (ref 0–8)
ERYTHROCYTE [DISTWIDTH] IN BLOOD BY AUTOMATED COUNT: 15.6 % (ref 11.5–14.5)
EST. GFR  (AFRICAN AMERICAN): >60 ML/MIN/1.73 M^2
EST. GFR  (NON AFRICAN AMERICAN): >60 ML/MIN/1.73 M^2
GLUCOSE SERPL-MCNC: 91 MG/DL (ref 70–110)
GLUCOSE UR QL STRIP: NEGATIVE
HCT VFR BLD AUTO: 47.8 % (ref 40–54)
HDLC SERPL-MCNC: 56 MG/DL (ref 40–75)
HDLC SERPL: 26.3 % (ref 20–50)
HGB BLD-MCNC: 15.2 G/DL (ref 14–18)
HGB UR QL STRIP: NEGATIVE
IMM GRANULOCYTES # BLD AUTO: 0.01 K/UL (ref 0–0.04)
IMM GRANULOCYTES NFR BLD AUTO: 0.2 % (ref 0–0.5)
KETONES UR QL STRIP: NEGATIVE
LDLC SERPL CALC-MCNC: 137.4 MG/DL (ref 63–159)
LEUKOCYTE ESTERASE UR QL STRIP: NEGATIVE
LYMPHOCYTES # BLD AUTO: 1.5 K/UL (ref 1–4.8)
LYMPHOCYTES NFR BLD: 31.6 % (ref 18–48)
MCH RBC QN AUTO: 29.3 PG (ref 27–31)
MCHC RBC AUTO-ENTMCNC: 31.8 G/DL (ref 32–36)
MCV RBC AUTO: 92 FL (ref 82–98)
MONOCYTES # BLD AUTO: 0.4 K/UL (ref 0.3–1)
MONOCYTES NFR BLD: 9.3 % (ref 4–15)
NEUTROPHILS # BLD AUTO: 2.6 K/UL (ref 1.8–7.7)
NEUTROPHILS NFR BLD: 54.3 % (ref 38–73)
NITRITE UR QL STRIP: NEGATIVE
NONHDLC SERPL-MCNC: 157 MG/DL
NRBC BLD-RTO: 0 /100 WBC
PH UR STRIP: 5 [PH] (ref 5–8)
PLATELET # BLD AUTO: 279 K/UL (ref 150–350)
PMV BLD AUTO: 9.6 FL (ref 9.2–12.9)
POTASSIUM SERPL-SCNC: 4.4 MMOL/L (ref 3.5–5.1)
PROT SERPL-MCNC: 6.9 G/DL (ref 6–8.4)
PROT UR QL STRIP: NEGATIVE
RBC # BLD AUTO: 5.19 M/UL (ref 4.6–6.2)
SODIUM SERPL-SCNC: 142 MMOL/L (ref 136–145)
SP GR UR STRIP: 1.01 (ref 1–1.03)
TRIGL SERPL-MCNC: 98 MG/DL (ref 30–150)
URATE SERPL-MCNC: 4.6 MG/DL (ref 3.4–7)
URN SPEC COLLECT METH UR: NORMAL
WBC # BLD AUTO: 4.75 K/UL (ref 3.9–12.7)

## 2020-08-18 PROCEDURE — 99999 PR PBB SHADOW E&M-EST. PATIENT-LVL III: CPT | Mod: PBBFAC,,, | Performed by: INTERNAL MEDICINE

## 2020-08-18 PROCEDURE — 36415 COLL VENOUS BLD VENIPUNCTURE: CPT

## 2020-08-18 PROCEDURE — 3008F PR BODY MASS INDEX (BMI) DOCUMENTED: ICD-10-PCS | Mod: CPTII,S$GLB,, | Performed by: INTERNAL MEDICINE

## 2020-08-18 PROCEDURE — 81003 URINALYSIS AUTO W/O SCOPE: CPT

## 2020-08-18 PROCEDURE — 85025 COMPLETE CBC W/AUTO DIFF WBC: CPT

## 2020-08-18 PROCEDURE — 84550 ASSAY OF BLOOD/URIC ACID: CPT

## 2020-08-18 PROCEDURE — 3008F BODY MASS INDEX DOCD: CPT | Mod: CPTII,S$GLB,, | Performed by: INTERNAL MEDICINE

## 2020-08-18 PROCEDURE — 80061 LIPID PANEL: CPT

## 2020-08-18 PROCEDURE — 80053 COMPREHEN METABOLIC PANEL: CPT

## 2020-08-18 PROCEDURE — 99396 PR PREVENTIVE VISIT,EST,40-64: ICD-10-PCS | Mod: S$GLB,,, | Performed by: INTERNAL MEDICINE

## 2020-08-18 PROCEDURE — 99396 PREV VISIT EST AGE 40-64: CPT | Mod: S$GLB,,, | Performed by: INTERNAL MEDICINE

## 2020-08-18 PROCEDURE — 99999 PR PBB SHADOW E&M-EST. PATIENT-LVL III: ICD-10-PCS | Mod: PBBFAC,,, | Performed by: INTERNAL MEDICINE

## 2020-08-19 ENCOUNTER — DOCUMENTATION ONLY (OUTPATIENT)
Dept: INTERNAL MEDICINE | Facility: CLINIC | Age: 45
End: 2020-08-19

## 2020-08-19 NOTE — PROGRESS NOTES
CC:  Annual exam    HPI:  Patient is a 45-year-old male who is currently being treated for gout presents today for annual exam.  The patient also takes Xanax on occasion when he has to travel.  Because of the pandemic, he is not traveling by air so this is not been a problem.    ROS:  Please see patient entered review of [employer] for HPI/ROS submitted by the patient on 8/16/2020   activity change: No  unexpected weight change: No  neck pain: No  hearing loss: No  rhinorrhea: No  trouble swallowing: No  eye discharge: No  visual disturbance: No  chest tightness: No  wheezing: No  chest pain: No  palpitations: No  blood in stool: No  constipation: No  vomiting: No  diarrhea: No  polydipsia: No  polyuria: No  difficulty urinating: No  urgency: No  hematuria: No  joint swelling: No  arthralgias: No  headaches: No  weakness: No  confusion: No  dysphoric mood: No  The patient reports he has been losing weight.  He is currently working from home and is walking with his wife almost daily.  He is also doing some exercises with resistance bands and kettle bells.    Physical exam:  General appearance:  No acute distress  HEENT:  Conjunctiva is clear.  Pupils equal reactive.  TMs are clear.  Nasal septum is midline without discharge.  Oropharynx is without erythema.  Trachea is midline without JVD or thyromegaly.  Pulmonary:  Good inspiratory, expiratory breath sounds are heard.  Lungs are clear to auscultation.  Cardiovascular:  S1-S2, rhythm is normal.  Extremities without edema.  GI:  Abdomen is nontender, nondistended without hepatosplenomegaly  :  Scrotum penis were normal.  Lymphatics:  No cervical, axillary, inguinal adenopathy    Assessment:  1.  Annual exam  2.  Gout currently stable  3.  History of elevated blood pressure currently stable off of medication    Plan:  1.  Will schedule CBC, CMP, lipid panel, UA and uric acid level  2.  Will order a fitkit.

## 2020-09-02 ENCOUNTER — LAB VISIT (OUTPATIENT)
Dept: LAB | Facility: HOSPITAL | Age: 45
End: 2020-09-02
Attending: INTERNAL MEDICINE
Payer: COMMERCIAL

## 2020-09-02 DIAGNOSIS — Z12.11 COLON CANCER SCREENING: ICD-10-CM

## 2020-09-02 PROCEDURE — 82274 ASSAY TEST FOR BLOOD FECAL: CPT

## 2020-09-09 LAB — HEMOCCULT STL QL IA: NEGATIVE

## 2020-11-23 ENCOUNTER — PATIENT MESSAGE (OUTPATIENT)
Dept: INTERNAL MEDICINE | Facility: CLINIC | Age: 45
End: 2020-11-23

## 2020-11-24 NOTE — TELEPHONE ENCOUNTER
Please contact and see how he is doing. He was recently diagnosed with Covid 19. Please see if he is having any trouble breathing, shortness of breath etc. Please see if he has access to a home pulse oximeter. These are available at the local pharmacy .

## 2020-11-24 NOTE — TELEPHONE ENCOUNTER
Spoke with pt, he is not SOB or having difficulty breathing. I advised not to take medrol. Pt says he is okay just congestions and a headache. He also lost his sense of taste and smell

## 2021-01-12 DIAGNOSIS — M10.9 GOUT, ARTHRITIS: ICD-10-CM

## 2021-01-15 DIAGNOSIS — M10.9 GOUT, ARTHRITIS: ICD-10-CM

## 2021-01-15 RX ORDER — ALLOPURINOL 300 MG/1
300 TABLET ORAL DAILY
Qty: 90 TABLET | Refills: 3 | Status: SHIPPED | OUTPATIENT
Start: 2021-01-15 | End: 2022-07-06 | Stop reason: SDUPTHER

## 2021-01-15 RX ORDER — ALLOPURINOL 300 MG/1
300 TABLET ORAL DAILY
Qty: 90 TABLET | Refills: 3 | Status: SHIPPED | OUTPATIENT
Start: 2021-01-15 | End: 2021-01-15 | Stop reason: SDUPTHER

## 2021-02-23 ENCOUNTER — PATIENT MESSAGE (OUTPATIENT)
Dept: RHEUMATOLOGY | Facility: CLINIC | Age: 46
End: 2021-02-23

## 2021-03-05 ENCOUNTER — PATIENT MESSAGE (OUTPATIENT)
Dept: INTERNAL MEDICINE | Facility: CLINIC | Age: 46
End: 2021-03-05

## 2021-03-05 ENCOUNTER — PATIENT MESSAGE (OUTPATIENT)
Dept: OPTOMETRY | Facility: CLINIC | Age: 46
End: 2021-03-05

## 2021-03-05 ENCOUNTER — DOCUMENTATION ONLY (OUTPATIENT)
Dept: INTERNAL MEDICINE | Facility: CLINIC | Age: 46
End: 2021-03-05

## 2021-04-12 ENCOUNTER — TELEPHONE (OUTPATIENT)
Dept: SPORTS MEDICINE | Facility: CLINIC | Age: 46
End: 2021-04-12

## 2021-04-12 DIAGNOSIS — Z71.3 NUTRITIONAL COUNSELING: Primary | ICD-10-CM

## 2021-04-20 ENCOUNTER — IMMUNIZATION (OUTPATIENT)
Dept: INTERNAL MEDICINE | Facility: CLINIC | Age: 46
End: 2021-04-20
Payer: COMMERCIAL

## 2021-04-20 DIAGNOSIS — Z23 NEED FOR VACCINATION: Primary | ICD-10-CM

## 2021-04-20 PROCEDURE — 0001A COVID-19, MRNA, LNP-S, PF, 30 MCG/0.3 ML DOSE VACCINE: CPT | Mod: CV19,,, | Performed by: INTERNAL MEDICINE

## 2021-04-20 PROCEDURE — 91300 COVID-19, MRNA, LNP-S, PF, 30 MCG/0.3 ML DOSE VACCINE: CPT | Mod: ,,, | Performed by: INTERNAL MEDICINE

## 2021-04-20 PROCEDURE — 91300 COVID-19, MRNA, LNP-S, PF, 30 MCG/0.3 ML DOSE VACCINE: ICD-10-PCS | Mod: ,,, | Performed by: INTERNAL MEDICINE

## 2021-04-20 PROCEDURE — 0001A COVID-19, MRNA, LNP-S, PF, 30 MCG/0.3 ML DOSE VACCINE: ICD-10-PCS | Mod: CV19,,, | Performed by: INTERNAL MEDICINE

## 2021-05-11 ENCOUNTER — IMMUNIZATION (OUTPATIENT)
Dept: INTERNAL MEDICINE | Facility: CLINIC | Age: 46
End: 2021-05-11
Payer: COMMERCIAL

## 2021-05-11 DIAGNOSIS — Z23 NEED FOR VACCINATION: Primary | ICD-10-CM

## 2021-05-11 PROCEDURE — 0002A COVID-19, MRNA, LNP-S, PF, 30 MCG/0.3 ML DOSE VACCINE: CPT | Mod: PBBFAC | Performed by: INTERNAL MEDICINE

## 2021-05-11 PROCEDURE — 91300 COVID-19, MRNA, LNP-S, PF, 30 MCG/0.3 ML DOSE VACCINE: CPT | Mod: PBBFAC | Performed by: INTERNAL MEDICINE

## 2021-05-26 ENCOUNTER — NUTRITION (OUTPATIENT)
Dept: NUTRITION | Facility: CLINIC | Age: 46
End: 2021-05-26
Payer: COMMERCIAL

## 2021-05-26 DIAGNOSIS — Z71.3 NUTRITIONAL COUNSELING: ICD-10-CM

## 2021-05-26 PROCEDURE — 99999 PR PBB SHADOW E&M-EST. PATIENT-LVL II: ICD-10-PCS | Mod: PBBFAC,,, | Performed by: DIETITIAN, REGISTERED

## 2021-05-26 PROCEDURE — 97802 MEDICAL NUTRITION INDIV IN: CPT | Mod: S$GLB,,, | Performed by: DIETITIAN, REGISTERED

## 2021-05-26 PROCEDURE — 97802 PR MED NUTR THER, 1ST, INDIV, EA 15 MIN: ICD-10-PCS | Mod: S$GLB,,, | Performed by: DIETITIAN, REGISTERED

## 2021-05-26 PROCEDURE — 99999 PR PBB SHADOW E&M-EST. PATIENT-LVL II: CPT | Mod: PBBFAC,,, | Performed by: DIETITIAN, REGISTERED

## 2021-05-27 VITALS — BODY MASS INDEX: 27.21 KG/M2 | WEIGHT: 196.5 LBS

## 2021-06-21 ENCOUNTER — NUTRITION (OUTPATIENT)
Dept: NUTRITION | Facility: CLINIC | Age: 46
End: 2021-06-21
Payer: COMMERCIAL

## 2021-06-21 VITALS — HEIGHT: 71 IN | BODY MASS INDEX: 26.02 KG/M2 | WEIGHT: 185.88 LBS

## 2021-06-21 DIAGNOSIS — Z71.3 NUTRITIONAL COUNSELING: Primary | ICD-10-CM

## 2021-06-21 PROCEDURE — 97803 PR MED NUTR THER, SUBSQ, INDIV, EA 15 MIN: ICD-10-PCS | Mod: S$GLB,,, | Performed by: DIETITIAN, REGISTERED

## 2021-06-21 PROCEDURE — 99999 PR PBB SHADOW E&M-EST. PATIENT-LVL I: ICD-10-PCS | Mod: PBBFAC,,, | Performed by: DIETITIAN, REGISTERED

## 2021-06-21 PROCEDURE — 99999 PR PBB SHADOW E&M-EST. PATIENT-LVL I: CPT | Mod: PBBFAC,,, | Performed by: DIETITIAN, REGISTERED

## 2021-06-21 PROCEDURE — 97803 MED NUTRITION INDIV SUBSEQ: CPT | Mod: S$GLB,,, | Performed by: DIETITIAN, REGISTERED

## 2021-07-29 ENCOUNTER — NUTRITION (OUTPATIENT)
Dept: NUTRITION | Facility: CLINIC | Age: 46
End: 2021-07-29
Payer: COMMERCIAL

## 2021-07-29 DIAGNOSIS — Z71.3 NUTRITIONAL COUNSELING: Primary | ICD-10-CM

## 2021-07-29 PROCEDURE — 97803 MED NUTRITION INDIV SUBSEQ: CPT | Mod: S$GLB,,, | Performed by: DIETITIAN, REGISTERED

## 2021-07-29 PROCEDURE — 97803 PR MED NUTR THER, SUBSQ, INDIV, EA 15 MIN: ICD-10-PCS | Mod: S$GLB,,, | Performed by: DIETITIAN, REGISTERED

## 2021-07-30 VITALS — WEIGHT: 175.13 LBS | BODY MASS INDEX: 24.42 KG/M2

## 2021-08-20 ENCOUNTER — OFFICE VISIT (OUTPATIENT)
Dept: DERMATOLOGY | Facility: CLINIC | Age: 46
End: 2021-08-20
Payer: COMMERCIAL

## 2021-08-20 DIAGNOSIS — Z12.83 SCREENING EXAM FOR SKIN CANCER: ICD-10-CM

## 2021-08-20 DIAGNOSIS — L81.4 LENTIGO: ICD-10-CM

## 2021-08-20 DIAGNOSIS — D48.5 NEOPLASM OF UNCERTAIN BEHAVIOR OF SKIN: Primary | ICD-10-CM

## 2021-08-20 DIAGNOSIS — D22.9 MULTIPLE BENIGN NEVI: ICD-10-CM

## 2021-08-20 PROCEDURE — 11102 PR TANGENTIAL BIOPSY, SKIN, SINGLE LESION: ICD-10-PCS | Mod: S$GLB,,, | Performed by: DERMATOLOGY

## 2021-08-20 PROCEDURE — 99999 PR PBB SHADOW E&M-EST. PATIENT-LVL III: CPT | Mod: PBBFAC,,, | Performed by: DERMATOLOGY

## 2021-08-20 PROCEDURE — 99203 PR OFFICE/OUTPT VISIT, NEW, LEVL III, 30-44 MIN: ICD-10-PCS | Mod: 25,S$GLB,, | Performed by: DERMATOLOGY

## 2021-08-20 PROCEDURE — 1126F AMNT PAIN NOTED NONE PRSNT: CPT | Mod: CPTII,S$GLB,, | Performed by: DERMATOLOGY

## 2021-08-20 PROCEDURE — 1159F MED LIST DOCD IN RCRD: CPT | Mod: CPTII,S$GLB,, | Performed by: DERMATOLOGY

## 2021-08-20 PROCEDURE — 88305 TISSUE EXAM BY PATHOLOGIST: CPT | Mod: 59 | Performed by: PATHOLOGY

## 2021-08-20 PROCEDURE — 1159F PR MEDICATION LIST DOCUMENTED IN MEDICAL RECORD: ICD-10-PCS | Mod: CPTII,S$GLB,, | Performed by: DERMATOLOGY

## 2021-08-20 PROCEDURE — 99203 OFFICE O/P NEW LOW 30 MIN: CPT | Mod: 25,S$GLB,, | Performed by: DERMATOLOGY

## 2021-08-20 PROCEDURE — 11102 TANGNTL BX SKIN SINGLE LES: CPT | Mod: S$GLB,,, | Performed by: DERMATOLOGY

## 2021-08-20 PROCEDURE — 88305 TISSUE EXAM BY PATHOLOGIST: CPT | Mod: 26,,, | Performed by: PATHOLOGY

## 2021-08-20 PROCEDURE — 99999 PR PBB SHADOW E&M-EST. PATIENT-LVL III: ICD-10-PCS | Mod: PBBFAC,,, | Performed by: DERMATOLOGY

## 2021-08-20 PROCEDURE — 1126F PR PAIN SEVERITY QUANTIFIED, NO PAIN PRESENT: ICD-10-PCS | Mod: CPTII,S$GLB,, | Performed by: DERMATOLOGY

## 2021-08-20 PROCEDURE — 88305 TISSUE EXAM BY PATHOLOGIST: ICD-10-PCS | Mod: 26,,, | Performed by: PATHOLOGY

## 2021-08-20 PROCEDURE — 11103 TANGNTL BX SKIN EA SEP/ADDL: CPT | Mod: S$GLB,,, | Performed by: DERMATOLOGY

## 2021-08-20 PROCEDURE — 11103 PR TANGENTIAL BIOPSY, SKIN, EA ADDTL LESION: ICD-10-PCS | Mod: S$GLB,,, | Performed by: DERMATOLOGY

## 2021-08-25 LAB
FINAL PATHOLOGIC DIAGNOSIS: NORMAL
GROSS: NORMAL
Lab: NORMAL
MICROSCOPIC EXAM: NORMAL

## 2021-09-21 ENCOUNTER — TELEPHONE (OUTPATIENT)
Dept: INTERNAL MEDICINE | Facility: CLINIC | Age: 46
End: 2021-09-21

## 2021-09-21 ENCOUNTER — LAB VISIT (OUTPATIENT)
Dept: LAB | Facility: HOSPITAL | Age: 46
End: 2021-09-21
Attending: INTERNAL MEDICINE
Payer: COMMERCIAL

## 2021-09-21 ENCOUNTER — PATIENT MESSAGE (OUTPATIENT)
Dept: INTERNAL MEDICINE | Facility: CLINIC | Age: 46
End: 2021-09-21

## 2021-09-21 ENCOUNTER — OFFICE VISIT (OUTPATIENT)
Dept: INTERNAL MEDICINE | Facility: CLINIC | Age: 46
End: 2021-09-21
Payer: COMMERCIAL

## 2021-09-21 VITALS
HEART RATE: 82 BPM | WEIGHT: 174.38 LBS | BODY MASS INDEX: 24.41 KG/M2 | OXYGEN SATURATION: 100 % | SYSTOLIC BLOOD PRESSURE: 122 MMHG | DIASTOLIC BLOOD PRESSURE: 80 MMHG | HEIGHT: 71 IN

## 2021-09-21 DIAGNOSIS — Z00.00 ANNUAL PHYSICAL EXAM: Primary | ICD-10-CM

## 2021-09-21 DIAGNOSIS — M10.9 GOUT, UNSPECIFIED CAUSE, UNSPECIFIED CHRONICITY, UNSPECIFIED SITE: ICD-10-CM

## 2021-09-21 DIAGNOSIS — Z00.00 ANNUAL PHYSICAL EXAM: ICD-10-CM

## 2021-09-21 DIAGNOSIS — Z12.11 COLON CANCER SCREENING: ICD-10-CM

## 2021-09-21 DIAGNOSIS — Z12.5 PROSTATE CANCER SCREENING: ICD-10-CM

## 2021-09-21 DIAGNOSIS — M54.31 SCIATICA OF RIGHT SIDE: ICD-10-CM

## 2021-09-21 LAB
ALBUMIN SERPL BCP-MCNC: 4.7 G/DL (ref 3.5–5.2)
ALP SERPL-CCNC: 50 U/L (ref 55–135)
ALT SERPL W/O P-5'-P-CCNC: 20 U/L (ref 10–44)
ANION GAP SERPL CALC-SCNC: 11 MMOL/L (ref 8–16)
AST SERPL-CCNC: 21 U/L (ref 10–40)
BASOPHILS # BLD AUTO: 0.03 K/UL (ref 0–0.2)
BASOPHILS NFR BLD: 0.4 % (ref 0–1.9)
BILIRUB SERPL-MCNC: 0.8 MG/DL (ref 0.1–1)
BILIRUB UR QL STRIP: NEGATIVE
BUN SERPL-MCNC: 11 MG/DL (ref 6–20)
CALCIUM SERPL-MCNC: 9.8 MG/DL (ref 8.7–10.5)
CHLORIDE SERPL-SCNC: 106 MMOL/L (ref 95–110)
CHOLEST SERPL-MCNC: 213 MG/DL (ref 120–199)
CHOLEST/HDLC SERPL: 3.2 {RATIO} (ref 2–5)
CLARITY UR REFRACT.AUTO: CLEAR
CO2 SERPL-SCNC: 25 MMOL/L (ref 23–29)
COLOR UR AUTO: NORMAL
COMPLEXED PSA SERPL-MCNC: 0.33 NG/ML (ref 0–4)
CREAT SERPL-MCNC: 1.1 MG/DL (ref 0.5–1.4)
DIFFERENTIAL METHOD: ABNORMAL
EOSINOPHIL # BLD AUTO: 0.1 K/UL (ref 0–0.5)
EOSINOPHIL NFR BLD: 1.4 % (ref 0–8)
ERYTHROCYTE [DISTWIDTH] IN BLOOD BY AUTOMATED COUNT: 15.3 % (ref 11.5–14.5)
EST. GFR  (AFRICAN AMERICAN): >60 ML/MIN/1.73 M^2
EST. GFR  (NON AFRICAN AMERICAN): >60 ML/MIN/1.73 M^2
GLUCOSE SERPL-MCNC: 91 MG/DL (ref 70–110)
GLUCOSE UR QL STRIP: NEGATIVE
HCT VFR BLD AUTO: 48.5 % (ref 40–54)
HDLC SERPL-MCNC: 66 MG/DL (ref 40–75)
HDLC SERPL: 31 % (ref 20–50)
HGB BLD-MCNC: 15.9 G/DL (ref 14–18)
HGB UR QL STRIP: NEGATIVE
IMM GRANULOCYTES # BLD AUTO: 0.05 K/UL (ref 0–0.04)
IMM GRANULOCYTES NFR BLD AUTO: 0.7 % (ref 0–0.5)
KETONES UR QL STRIP: NEGATIVE
LDLC SERPL CALC-MCNC: 127 MG/DL (ref 63–159)
LEUKOCYTE ESTERASE UR QL STRIP: NEGATIVE
LYMPHOCYTES # BLD AUTO: 1.9 K/UL (ref 1–4.8)
LYMPHOCYTES NFR BLD: 26.1 % (ref 18–48)
MCH RBC QN AUTO: 29.2 PG (ref 27–31)
MCHC RBC AUTO-ENTMCNC: 32.8 G/DL (ref 32–36)
MCV RBC AUTO: 89 FL (ref 82–98)
MICROSCOPIC COMMENT: NORMAL
MONOCYTES # BLD AUTO: 0.5 K/UL (ref 0.3–1)
MONOCYTES NFR BLD: 7.2 % (ref 4–15)
NEUTROPHILS # BLD AUTO: 4.6 K/UL (ref 1.8–7.7)
NEUTROPHILS NFR BLD: 64.2 % (ref 38–73)
NITRITE UR QL STRIP: NEGATIVE
NONHDLC SERPL-MCNC: 147 MG/DL
NRBC BLD-RTO: 0 /100 WBC
PH UR STRIP: 7 [PH] (ref 5–8)
PLATELET # BLD AUTO: 298 K/UL (ref 150–450)
PMV BLD AUTO: 9 FL (ref 9.2–12.9)
POTASSIUM SERPL-SCNC: 4.3 MMOL/L (ref 3.5–5.1)
PROT SERPL-MCNC: 7.4 G/DL (ref 6–8.4)
PROT UR QL STRIP: NEGATIVE
RBC # BLD AUTO: 5.44 M/UL (ref 4.6–6.2)
RBC #/AREA URNS AUTO: 0 /HPF (ref 0–4)
SODIUM SERPL-SCNC: 142 MMOL/L (ref 136–145)
SP GR UR STRIP: 1.01 (ref 1–1.03)
TRIGL SERPL-MCNC: 100 MG/DL (ref 30–150)
URATE SERPL-MCNC: 4.3 MG/DL (ref 3.4–7)
URN SPEC COLLECT METH UR: NORMAL
WBC # BLD AUTO: 7.23 K/UL (ref 3.9–12.7)

## 2021-09-21 PROCEDURE — 80061 LIPID PANEL: CPT | Performed by: INTERNAL MEDICINE

## 2021-09-21 PROCEDURE — 3008F PR BODY MASS INDEX (BMI) DOCUMENTED: ICD-10-PCS | Mod: CPTII,S$GLB,, | Performed by: INTERNAL MEDICINE

## 2021-09-21 PROCEDURE — 1159F PR MEDICATION LIST DOCUMENTED IN MEDICAL RECORD: ICD-10-PCS | Mod: CPTII,S$GLB,, | Performed by: INTERNAL MEDICINE

## 2021-09-21 PROCEDURE — 99999 PR PBB SHADOW E&M-EST. PATIENT-LVL IV: ICD-10-PCS | Mod: PBBFAC,,, | Performed by: INTERNAL MEDICINE

## 2021-09-21 PROCEDURE — 1160F PR REVIEW ALL MEDS BY PRESCRIBER/CLIN PHARMACIST DOCUMENTED: ICD-10-PCS | Mod: CPTII,S$GLB,, | Performed by: INTERNAL MEDICINE

## 2021-09-21 PROCEDURE — 99999 PR PBB SHADOW E&M-EST. PATIENT-LVL IV: CPT | Mod: PBBFAC,,, | Performed by: INTERNAL MEDICINE

## 2021-09-21 PROCEDURE — 3079F PR MOST RECENT DIASTOLIC BLOOD PRESSURE 80-89 MM HG: ICD-10-PCS | Mod: CPTII,S$GLB,, | Performed by: INTERNAL MEDICINE

## 2021-09-21 PROCEDURE — 85025 COMPLETE CBC W/AUTO DIFF WBC: CPT | Performed by: INTERNAL MEDICINE

## 2021-09-21 PROCEDURE — 1159F MED LIST DOCD IN RCRD: CPT | Mod: CPTII,S$GLB,, | Performed by: INTERNAL MEDICINE

## 2021-09-21 PROCEDURE — 99396 PR PREVENTIVE VISIT,EST,40-64: ICD-10-PCS | Mod: S$GLB,,, | Performed by: INTERNAL MEDICINE

## 2021-09-21 PROCEDURE — 36415 COLL VENOUS BLD VENIPUNCTURE: CPT | Performed by: INTERNAL MEDICINE

## 2021-09-21 PROCEDURE — 80053 COMPREHEN METABOLIC PANEL: CPT | Performed by: INTERNAL MEDICINE

## 2021-09-21 PROCEDURE — 81001 URINALYSIS AUTO W/SCOPE: CPT | Performed by: INTERNAL MEDICINE

## 2021-09-21 PROCEDURE — 99396 PREV VISIT EST AGE 40-64: CPT | Mod: S$GLB,,, | Performed by: INTERNAL MEDICINE

## 2021-09-21 PROCEDURE — 3074F PR MOST RECENT SYSTOLIC BLOOD PRESSURE < 130 MM HG: ICD-10-PCS | Mod: CPTII,S$GLB,, | Performed by: INTERNAL MEDICINE

## 2021-09-21 PROCEDURE — 1160F RVW MEDS BY RX/DR IN RCRD: CPT | Mod: CPTII,S$GLB,, | Performed by: INTERNAL MEDICINE

## 2021-09-21 PROCEDURE — 3074F SYST BP LT 130 MM HG: CPT | Mod: CPTII,S$GLB,, | Performed by: INTERNAL MEDICINE

## 2021-09-21 PROCEDURE — 84153 ASSAY OF PSA TOTAL: CPT | Performed by: INTERNAL MEDICINE

## 2021-09-21 PROCEDURE — 3079F DIAST BP 80-89 MM HG: CPT | Mod: CPTII,S$GLB,, | Performed by: INTERNAL MEDICINE

## 2021-09-21 PROCEDURE — 3008F BODY MASS INDEX DOCD: CPT | Mod: CPTII,S$GLB,, | Performed by: INTERNAL MEDICINE

## 2021-09-21 PROCEDURE — 84550 ASSAY OF BLOOD/URIC ACID: CPT | Performed by: INTERNAL MEDICINE

## 2021-09-21 RX ORDER — METHOCARBAMOL 500 MG/1
500 TABLET, FILM COATED ORAL 2 TIMES DAILY PRN
Qty: 20 TABLET | Refills: 0 | Status: SHIPPED | OUTPATIENT
Start: 2021-09-21 | End: 2021-10-01

## 2021-09-22 ENCOUNTER — CLINICAL SUPPORT (OUTPATIENT)
Dept: REHABILITATION | Facility: HOSPITAL | Age: 46
End: 2021-09-22
Attending: INTERNAL MEDICINE
Payer: COMMERCIAL

## 2021-09-22 DIAGNOSIS — M54.41 ACUTE MIDLINE LOW BACK PAIN WITH RIGHT-SIDED SCIATICA: ICD-10-CM

## 2021-09-22 DIAGNOSIS — M54.31 SCIATICA OF RIGHT SIDE: ICD-10-CM

## 2021-09-22 PROBLEM — M54.50 LOW BACK PAIN: Status: ACTIVE | Noted: 2021-09-22

## 2021-09-22 PROCEDURE — 97140 MANUAL THERAPY 1/> REGIONS: CPT

## 2021-09-22 PROCEDURE — 97110 THERAPEUTIC EXERCISES: CPT

## 2021-09-22 PROCEDURE — 97161 PT EVAL LOW COMPLEX 20 MIN: CPT

## 2021-09-24 ENCOUNTER — CLINICAL SUPPORT (OUTPATIENT)
Dept: REHABILITATION | Facility: HOSPITAL | Age: 46
End: 2021-09-24
Attending: INTERNAL MEDICINE
Payer: COMMERCIAL

## 2021-09-24 DIAGNOSIS — M54.41 ACUTE MIDLINE LOW BACK PAIN WITH RIGHT-SIDED SCIATICA: ICD-10-CM

## 2021-09-24 PROCEDURE — 97140 MANUAL THERAPY 1/> REGIONS: CPT

## 2021-09-24 PROCEDURE — 97110 THERAPEUTIC EXERCISES: CPT

## 2021-09-28 ENCOUNTER — CLINICAL SUPPORT (OUTPATIENT)
Dept: REHABILITATION | Facility: HOSPITAL | Age: 46
End: 2021-09-28
Attending: INTERNAL MEDICINE
Payer: COMMERCIAL

## 2021-09-28 DIAGNOSIS — M54.41 ACUTE MIDLINE LOW BACK PAIN WITH RIGHT-SIDED SCIATICA: ICD-10-CM

## 2021-09-28 PROCEDURE — 97110 THERAPEUTIC EXERCISES: CPT

## 2021-09-28 PROCEDURE — 97140 MANUAL THERAPY 1/> REGIONS: CPT

## 2021-10-06 ENCOUNTER — CLINICAL SUPPORT (OUTPATIENT)
Dept: REHABILITATION | Facility: HOSPITAL | Age: 46
End: 2021-10-06
Attending: INTERNAL MEDICINE
Payer: COMMERCIAL

## 2021-10-06 DIAGNOSIS — M54.41 ACUTE MIDLINE LOW BACK PAIN WITH RIGHT-SIDED SCIATICA: ICD-10-CM

## 2021-10-06 PROCEDURE — 97110 THERAPEUTIC EXERCISES: CPT

## 2021-10-06 PROCEDURE — 97140 MANUAL THERAPY 1/> REGIONS: CPT

## 2022-06-27 DIAGNOSIS — M10.9 GOUT, ARTHRITIS: ICD-10-CM

## 2022-06-27 RX ORDER — ALLOPURINOL 300 MG/1
300 TABLET ORAL DAILY
Qty: 90 TABLET | Refills: 3 | Status: CANCELLED | OUTPATIENT
Start: 2022-06-27

## 2022-06-28 DIAGNOSIS — M10.9 GOUT, ARTHRITIS: ICD-10-CM

## 2022-06-28 RX ORDER — ALLOPURINOL 300 MG/1
300 TABLET ORAL DAILY
Qty: 90 TABLET | Refills: 3 | OUTPATIENT
Start: 2022-06-28

## 2022-07-01 NOTE — PROGRESS NOTES
Subjective:       Patient ID: Jarod Arias Sr. is a 46 y.o. male with gout    Chief Complaint: No chief complaint on file.  LV 7/24/19    HPI   43 yr old man with gout seen initially on 7/25/18 & last on 7/24/19.  He continues doing well only on allopurinol which he is taking at the dose of 300 mg/d (one tablet).  He has not used colchicine in years.  He denies renal calculi & usually tries to stick to a diet.  Has gained a few lbs.    Had a bout of LBP w possible sciatica but that resolved.       Prior hx  Was running out of allopurinol and has been taking only 100 mg of allopurinol/day over that last 2 months or so. Had labs but did not have UA level done.  One year ago it 5.0 on 200 mg of allopurinol daily. He tolerates allopurinol well and has not had to use colchicine at all but requests a new prescription as it is very old.   He denies renal calculi & has refrained from drinking beer and trying to stick to a diet.     Pertinent hx  On Aug 17, 2017 felt like he stubbed his R big toe. It became sore, red & painful to touch & swollen.  Saw Dr. Velazquez and she felt it was gout.  He was given information and prescribed indomethacin but he did not take it. It resolved wi 2 days on its own.    Has subsequently had small attacks always in his R toe --4-5 episodes characterized mostly by soreness, but he was able to walk. These tended to resolve on their own within 1 day.  In October had an attack that lasted a bit longer and also had an episode on 11/13 that took 2 days to resolve. Ibuprofen 400 mg helped.  Was started on allopurinol 100 mg/d about 1 month ago.   Used to drink once a week but over the summer drank more. He drank twice a week anywhere from 2-4 beers at a time. No homemade liquor. Had also gotten heavy and had poor eating habits. Now has been paying attention to his diet and has lost at least 20 lbs in 3 months. No personal hx of renal calculi. No Pb exposure. No renal calculi.   Has a  maternal uncle that probably has gout.  No family hx of kidney stones.           Current Outpatient Medications   Medication Sig Dispense Refill    allopurinoL (ZYLOPRIM) 300 MG tablet Take 1 tablet (300 mg total) by mouth once daily. 90 tablet 3    azelastine (ASTELIN) 137 mcg (0.1 %) nasal spray 2 sprays (274 mcg total) by Nasal route 2 (two) times daily. 30 mL 12    cetirizine (ZYRTEC) 10 MG tablet Take 10 mg by mouth once daily.      diphenhydrAMINE (BENADRYL) 25 mg capsule Take 25 mg by mouth every 6 (six) hours as needed for Itching.      fluticasone propionate (FLONASE) 50 mcg/actuation nasal spray 1 spray by Each Nare route once daily.      pseudoephedrine (SUDAFED) 30 MG tablet Take 30 mg by mouth every 4 (four) hours as needed for Congestion.       No current facility-administered medications for this visit.       Review of patient's allergies indicates:   Allergen Reactions    No known drug allergies        Past Medical History:   Diagnosis Date    Allergy     Elevated blood pressure reading without diagnosis of hypertension     Gout     Left wrist fracture      Past Surgical History:   Procedure Laterality Date    PILONIDAL CYST DRAINAGE      WISDOM TOOTH EXTRACTION             Review of Systems   Constitutional: Negative.  Negative for fatigue, fever and unexpected weight change.   HENT: Negative.  Negative for hearing loss, mouth sores, sore throat, tinnitus and trouble swallowing.    Eyes: Negative.  Negative for redness and visual disturbance.   Respiratory: Negative.  Negative for cough, choking, chest tightness and shortness of breath.    Cardiovascular: Negative.  Negative for chest pain, palpitations and leg swelling.   Gastrointestinal: Negative.  Negative for abdominal pain, blood in stool, constipation, diarrhea, nausea and vomiting.   Genitourinary: Negative.  Negative for frequency, genital sores, hematuria and urgency.   Musculoskeletal: Negative.  Negative for back pain,  "myalgias, neck pain and neck stiffness.   Skin: Negative.  Negative for rash.   Neurological: Negative.  Negative for dizziness, syncope, numbness and headaches.   Hematological: Does not bruise/bleed easily.   Psychiatric/Behavioral: Negative.  Negative for dysphoric mood and sleep disturbance. The patient is not nervous/anxious.        Family History   Problem Relation Age of Onset    Cancer Maternal Grandmother 65        Lung    Cancer Maternal Grandfather     Allergic rhinitis Father     Allergies Neg Hx     Angioedema Neg Hx     Asthma Neg Hx     Atopy Neg Hx     Eczema Neg Hx     Immunodeficiency Neg Hx     Urticaria Neg Hx     Rhinitis Neg Hx      M:  at 60: COPD;   F: HBP, CAD,  Maternal uncle with probable gout.   Father w single episode of gout.  1 S: A& W  Teen sons in good health.     Social History     Tobacco Use    Smoking status: Never Smoker    Smokeless tobacco: Former User   Substance Use Topics    Alcohol use: Yes    Drug use: No   Is an .    Is active.   Objective:  .vs  /84   Pulse 71   Ht 5' 11" (1.803 m)   Wt 89.5 kg (197 lb 5 oz)   BMI 27.52 kg/m²        Physical Exam   Constitutional: He is oriented to person, place, and time. No distress.   HENT:   Head: Normocephalic and atraumatic.   Mouth/Throat: Oropharynx is clear and moist. No oropharyngeal exudate.   No facial rashes  Parotids not enlarged  No oral ulcers   Eyes: Pupils are equal, round, and reactive to light. Conjunctivae are normal. Right eye exhibits no discharge. Left eye exhibits no discharge. No scleral icterus.   Neck: No JVD present. No tracheal deviation present. No thyromegaly present.   Cardiovascular: Normal rate, regular rhythm and normal heart sounds. Exam reveals no gallop and no friction rub.   No murmur heard.  Pulmonary/Chest: Effort normal and breath sounds normal. No respiratory distress. He has no wheezes. He has no rales. He exhibits no tenderness.   Abdominal: Soft. Bowel " sounds are normal. He exhibits no distension and no mass. There is no splenomegaly or hepatomegaly. There is no abdominal tenderness. There is no rebound and no guarding.   Musculoskeletal:         General: No tenderness. Normal range of motion.      Cervical back: Neck supple.      Comments: Cspine FROM no tenderness  Tspine FROM no tenderness  Lspine FROM no tenderness.  TMJ: unremarkable  Shoulders: FROM; no synovitis;  Elbows: FROM; no synovitis; no tophi or nodules  Wrists: FROM; no synovitis;    MCPs: FROM; no synovitis; no metacarpalgia;  ok;  PIPs:FROM; no synovitis;   DIPs: FROM; no synovitis;   HIPS: FROM  Knees: FROM; no synovitis; no instability;  Ankles: FROM: no synovitis   Toes: ok; no metatarsalgia;        Lymphadenopathy:     He has no cervical adenopathy. No inguinal adenopathy noted on the right or left side.   Neurological: He is alert and oriented to person, place, and time. He has normal reflexes. No cranial nerve deficit. Gait normal.   Proximal and distal muscle strength 5/5.   Skin: Skin is warm and dry. No rash noted. He is not diaphoretic.   Psychiatric: Mood, memory, affect and judgment normal.   Vitals reviewed.      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           Plan:   Labs & UA level today.  Keep on allopurinol 300 mg/d as patient prefers a single 300 mg tablet to 2 100 mg tablets.   Discussed comorbidities associated with gout again.  He would like to f/u with us every year.   RTC 1 year.

## 2022-07-05 ENCOUNTER — OFFICE VISIT (OUTPATIENT)
Dept: RHEUMATOLOGY | Facility: CLINIC | Age: 47
End: 2022-07-05
Payer: COMMERCIAL

## 2022-07-05 VITALS
HEIGHT: 71 IN | WEIGHT: 197.31 LBS | BODY MASS INDEX: 27.62 KG/M2 | DIASTOLIC BLOOD PRESSURE: 84 MMHG | SYSTOLIC BLOOD PRESSURE: 123 MMHG | HEART RATE: 71 BPM

## 2022-07-05 DIAGNOSIS — Z79.899 ON ALLOPURINOL THERAPY: ICD-10-CM

## 2022-07-05 DIAGNOSIS — Z55.9 EDUCATIONAL CIRCUMSTANCE: ICD-10-CM

## 2022-07-05 DIAGNOSIS — M10.9 GOUT, ARTHRITIS: Primary | ICD-10-CM

## 2022-07-05 PROCEDURE — 1160F PR REVIEW ALL MEDS BY PRESCRIBER/CLIN PHARMACIST DOCUMENTED: ICD-10-PCS | Mod: CPTII,S$GLB,, | Performed by: INTERNAL MEDICINE

## 2022-07-05 PROCEDURE — 3008F PR BODY MASS INDEX (BMI) DOCUMENTED: ICD-10-PCS | Mod: CPTII,S$GLB,, | Performed by: INTERNAL MEDICINE

## 2022-07-05 PROCEDURE — 99999 PR PBB SHADOW E&M-EST. PATIENT-LVL III: CPT | Mod: PBBFAC,,, | Performed by: INTERNAL MEDICINE

## 2022-07-05 PROCEDURE — 99999 PR PBB SHADOW E&M-EST. PATIENT-LVL III: ICD-10-PCS | Mod: PBBFAC,,, | Performed by: INTERNAL MEDICINE

## 2022-07-05 PROCEDURE — 1159F MED LIST DOCD IN RCRD: CPT | Mod: CPTII,S$GLB,, | Performed by: INTERNAL MEDICINE

## 2022-07-05 PROCEDURE — 3074F PR MOST RECENT SYSTOLIC BLOOD PRESSURE < 130 MM HG: ICD-10-PCS | Mod: CPTII,S$GLB,, | Performed by: INTERNAL MEDICINE

## 2022-07-05 PROCEDURE — 1160F RVW MEDS BY RX/DR IN RCRD: CPT | Mod: CPTII,S$GLB,, | Performed by: INTERNAL MEDICINE

## 2022-07-05 PROCEDURE — 3074F SYST BP LT 130 MM HG: CPT | Mod: CPTII,S$GLB,, | Performed by: INTERNAL MEDICINE

## 2022-07-05 PROCEDURE — 99214 PR OFFICE/OUTPT VISIT, EST, LEVL IV, 30-39 MIN: ICD-10-PCS | Mod: S$GLB,,, | Performed by: INTERNAL MEDICINE

## 2022-07-05 PROCEDURE — 99214 OFFICE O/P EST MOD 30 MIN: CPT | Mod: S$GLB,,, | Performed by: INTERNAL MEDICINE

## 2022-07-05 PROCEDURE — 3008F BODY MASS INDEX DOCD: CPT | Mod: CPTII,S$GLB,, | Performed by: INTERNAL MEDICINE

## 2022-07-05 PROCEDURE — 3079F DIAST BP 80-89 MM HG: CPT | Mod: CPTII,S$GLB,, | Performed by: INTERNAL MEDICINE

## 2022-07-05 PROCEDURE — 3079F PR MOST RECENT DIASTOLIC BLOOD PRESSURE 80-89 MM HG: ICD-10-PCS | Mod: CPTII,S$GLB,, | Performed by: INTERNAL MEDICINE

## 2022-07-05 PROCEDURE — 1159F PR MEDICATION LIST DOCUMENTED IN MEDICAL RECORD: ICD-10-PCS | Mod: CPTII,S$GLB,, | Performed by: INTERNAL MEDICINE

## 2022-07-05 SDOH — SOCIAL DETERMINANTS OF HEALTH (SDOH): PROBLEMS RELATED TO EDUCATION AND LITERACY, UNSPECIFIED: Z55.9

## 2022-07-06 ENCOUNTER — PATIENT MESSAGE (OUTPATIENT)
Dept: RHEUMATOLOGY | Facility: CLINIC | Age: 47
End: 2022-07-06
Payer: COMMERCIAL

## 2022-07-22 ENCOUNTER — LAB VISIT (OUTPATIENT)
Dept: LAB | Facility: HOSPITAL | Age: 47
End: 2022-07-22
Attending: INTERNAL MEDICINE
Payer: COMMERCIAL

## 2022-07-22 DIAGNOSIS — M10.9 GOUT, ARTHRITIS: ICD-10-CM

## 2022-07-22 LAB
ALBUMIN SERPL BCP-MCNC: 4.5 G/DL (ref 3.5–5.2)
ALP SERPL-CCNC: 41 U/L (ref 55–135)
ALT SERPL W/O P-5'-P-CCNC: 12 U/L (ref 10–44)
ANION GAP SERPL CALC-SCNC: 7 MMOL/L (ref 8–16)
AST SERPL-CCNC: 15 U/L (ref 10–40)
BASOPHILS # BLD AUTO: 0.04 K/UL (ref 0–0.2)
BASOPHILS NFR BLD: 0.9 % (ref 0–1.9)
BILIRUB SERPL-MCNC: 0.9 MG/DL (ref 0.1–1)
BUN SERPL-MCNC: 11 MG/DL (ref 6–20)
CALCIUM SERPL-MCNC: 9.6 MG/DL (ref 8.7–10.5)
CHLORIDE SERPL-SCNC: 108 MMOL/L (ref 95–110)
CO2 SERPL-SCNC: 28 MMOL/L (ref 23–29)
CREAT SERPL-MCNC: 1.2 MG/DL (ref 0.5–1.4)
DIFFERENTIAL METHOD: ABNORMAL
EOSINOPHIL # BLD AUTO: 0.2 K/UL (ref 0–0.5)
EOSINOPHIL NFR BLD: 3.2 % (ref 0–8)
ERYTHROCYTE [DISTWIDTH] IN BLOOD BY AUTOMATED COUNT: 14.2 % (ref 11.5–14.5)
EST. GFR  (AFRICAN AMERICAN): >60 ML/MIN/1.73 M^2
EST. GFR  (NON AFRICAN AMERICAN): >60 ML/MIN/1.73 M^2
GLUCOSE SERPL-MCNC: 100 MG/DL (ref 70–110)
HCT VFR BLD AUTO: 46.7 % (ref 40–54)
HGB BLD-MCNC: 16.1 G/DL (ref 14–18)
IMM GRANULOCYTES # BLD AUTO: 0.04 K/UL (ref 0–0.04)
IMM GRANULOCYTES NFR BLD AUTO: 0.9 % (ref 0–0.5)
LYMPHOCYTES # BLD AUTO: 1.7 K/UL (ref 1–4.8)
LYMPHOCYTES NFR BLD: 35.9 % (ref 18–48)
MCH RBC QN AUTO: 30.9 PG (ref 27–31)
MCHC RBC AUTO-ENTMCNC: 34.5 G/DL (ref 32–36)
MCV RBC AUTO: 90 FL (ref 82–98)
MONOCYTES # BLD AUTO: 0.4 K/UL (ref 0.3–1)
MONOCYTES NFR BLD: 8.8 % (ref 4–15)
NEUTROPHILS # BLD AUTO: 2.4 K/UL (ref 1.8–7.7)
NEUTROPHILS NFR BLD: 50.3 % (ref 38–73)
NRBC BLD-RTO: 0 /100 WBC
PLATELET # BLD AUTO: 276 K/UL (ref 150–450)
PMV BLD AUTO: 9.3 FL (ref 9.2–12.9)
POTASSIUM SERPL-SCNC: 5 MMOL/L (ref 3.5–5.1)
PROT SERPL-MCNC: 6.8 G/DL (ref 6–8.4)
RBC # BLD AUTO: 5.21 M/UL (ref 4.6–6.2)
SODIUM SERPL-SCNC: 143 MMOL/L (ref 136–145)
URATE SERPL-MCNC: 5.8 MG/DL (ref 3.4–7)
WBC # BLD AUTO: 4.68 K/UL (ref 3.9–12.7)

## 2022-07-22 PROCEDURE — 84550 ASSAY OF BLOOD/URIC ACID: CPT | Performed by: INTERNAL MEDICINE

## 2022-07-22 PROCEDURE — 36415 COLL VENOUS BLD VENIPUNCTURE: CPT | Performed by: INTERNAL MEDICINE

## 2022-07-22 PROCEDURE — 80053 COMPREHEN METABOLIC PANEL: CPT | Performed by: INTERNAL MEDICINE

## 2022-07-22 PROCEDURE — 85025 COMPLETE CBC W/AUTO DIFF WBC: CPT | Performed by: INTERNAL MEDICINE

## 2022-09-09 ENCOUNTER — OFFICE VISIT (OUTPATIENT)
Dept: INTERNAL MEDICINE | Facility: CLINIC | Age: 47
End: 2022-09-09
Payer: COMMERCIAL

## 2022-09-09 ENCOUNTER — HOSPITAL ENCOUNTER (OUTPATIENT)
Dept: CARDIOLOGY | Facility: HOSPITAL | Age: 47
Discharge: HOME | End: 2022-09-09
Attending: INTERNAL MEDICINE
Payer: COMMERCIAL

## 2022-09-09 VITALS
OXYGEN SATURATION: 98 % | BODY MASS INDEX: 26.55 KG/M2 | HEIGHT: 71 IN | HEART RATE: 93 BPM | SYSTOLIC BLOOD PRESSURE: 108 MMHG | DIASTOLIC BLOOD PRESSURE: 74 MMHG | WEIGHT: 189.63 LBS

## 2022-09-09 VITALS — HEIGHT: 71 IN | WEIGHT: 189 LBS | BODY MASS INDEX: 26.46 KG/M2

## 2022-09-09 DIAGNOSIS — R07.9 CHEST PAIN, UNSPECIFIED TYPE: Primary | ICD-10-CM

## 2022-09-09 DIAGNOSIS — R07.9 CHEST PAIN, UNSPECIFIED TYPE: ICD-10-CM

## 2022-09-09 LAB
ASCENDING AORTA: 3.27 CM
BSA FOR ECHO PROCEDURE: 2.07 M2
CV ECHO LV RWT: 0.32 CM
CV STRESS BASE HR: 86 BPM
DIASTOLIC BLOOD PRESSURE: 84 MMHG
DOP CALC LVOT AREA: 4 CM2
DOP CALC LVOT DIAMETER: 2.26 CM
DOP CALC LVOT PEAK VEL: 0.81 M/S
DOP CALC LVOT STROKE VOLUME: 61.26 CM3
DOP CALCLVOT PEAK VEL VTI: 15.28 CM
E WAVE DECELERATION TIME: 228.58 MSEC
E/A RATIO: 0.83
E/E' RATIO: 6.63 M/S
ECHO LV POSTERIOR WALL: 0.74 CM (ref 0.6–1.1)
EJECTION FRACTION: 55 %
FRACTIONAL SHORTENING: 31 % (ref 28–44)
INTERVENTRICULAR SEPTUM: 0.9 CM (ref 0.6–1.1)
IVRT: 108.47 MSEC
LA MAJOR: 5.04 CM
LA MINOR: 4.87 CM
LA WIDTH: 3.56 CM
LEFT ATRIUM SIZE: 3.39 CM
LEFT ATRIUM VOLUME INDEX: 24.7 ML/M2
LEFT ATRIUM VOLUME: 50.81 CM3
LEFT INTERNAL DIMENSION IN SYSTOLE: 3.18 CM (ref 2.1–4)
LEFT VENTRICLE DIASTOLIC VOLUME INDEX: 47.99 ML/M2
LEFT VENTRICLE DIASTOLIC VOLUME: 98.85 ML
LEFT VENTRICLE MASS INDEX: 60 G/M2
LEFT VENTRICLE SYSTOLIC VOLUME INDEX: 19.6 ML/M2
LEFT VENTRICLE SYSTOLIC VOLUME: 40.39 ML
LEFT VENTRICULAR INTERNAL DIMENSION IN DIASTOLE: 4.63 CM (ref 3.5–6)
LEFT VENTRICULAR MASS: 123.1 G
LV LATERAL E/E' RATIO: 5.25 M/S
LV SEPTAL E/E' RATIO: 9 M/S
MV A" WAVE DURATION": 9.99 MSEC
MV PEAK A VEL: 0.76 M/S
MV PEAK E VEL: 0.63 M/S
MV STENOSIS PRESSURE HALF TIME: 66.29 MS
MV VALVE AREA P 1/2 METHOD: 3.32 CM2
OHS CV CPX 1 MINUTE RECOVERY HEART RATE: 134 BPM
OHS CV CPX 85 PERCENT MAX PREDICTED HEART RATE MALE: 147
OHS CV CPX ESTIMATED METS: 13
OHS CV CPX MAX PREDICTED HEART RATE: 173
OHS CV CPX PATIENT IS FEMALE: 0
OHS CV CPX PATIENT IS MALE: 1
OHS CV CPX PEAK DIASTOLIC BLOOD PRESSURE: 81 MMHG
OHS CV CPX PEAK HEAR RATE: 173 BPM
OHS CV CPX PEAK RATE PRESSURE PRODUCT: NORMAL
OHS CV CPX PEAK SYSTOLIC BLOOD PRESSURE: 175 MMHG
OHS CV CPX PERCENT MAX PREDICTED HEART RATE ACHIEVED: 100
OHS CV CPX RATE PRESSURE PRODUCT PRESENTING: NORMAL
PISA TR MAX VEL: 2.21 M/S
PULM VEIN S/D RATIO: 1
PV PEAK D VEL: 0.39 M/S
PV PEAK S VEL: 0.39 M/S
RA MAJOR: 4.79 CM
RA PRESSURE: 3 MMHG
RA WIDTH: 3.22 CM
RIGHT VENTRICULAR END-DIASTOLIC DIMENSION: 3.65 CM
RV TISSUE DOPPLER FREE WALL SYSTOLIC VELOCITY 1 (APICAL 4 CHAMBER VIEW): 10.39 CM/S
SINUS: 4.17 CM
STJ: 2.95 CM
STRESS ECHO POST EXERCISE DUR MIN: 7 MINUTES
STRESS ECHO POST EXERCISE DUR SEC: 45 SECONDS
SYSTOLIC BLOOD PRESSURE: 124 MMHG
TDI LATERAL: 0.12 M/S
TDI SEPTAL: 0.07 M/S
TDI: 0.1 M/S
TR MAX PG: 20 MMHG
TRICUSPID ANNULAR PLANE SYSTOLIC EXCURSION: 2.41 CM
TV REST PULMONARY ARTERY PRESSURE: 23 MMHG

## 2022-09-09 PROCEDURE — 93005 ELECTROCARDIOGRAM TRACING: CPT | Mod: S$GLB,,, | Performed by: INTERNAL MEDICINE

## 2022-09-09 PROCEDURE — 1160F PR REVIEW ALL MEDS BY PRESCRIBER/CLIN PHARMACIST DOCUMENTED: ICD-10-PCS | Mod: CPTII,S$GLB,, | Performed by: INTERNAL MEDICINE

## 2022-09-09 PROCEDURE — 1159F PR MEDICATION LIST DOCUMENTED IN MEDICAL RECORD: ICD-10-PCS | Mod: CPTII,S$GLB,, | Performed by: INTERNAL MEDICINE

## 2022-09-09 PROCEDURE — 93010 EKG 12-LEAD: ICD-10-PCS | Mod: S$GLB,,, | Performed by: INTERNAL MEDICINE

## 2022-09-09 PROCEDURE — 3074F SYST BP LT 130 MM HG: CPT | Mod: CPTII,S$GLB,, | Performed by: INTERNAL MEDICINE

## 2022-09-09 PROCEDURE — 3008F PR BODY MASS INDEX (BMI) DOCUMENTED: ICD-10-PCS | Mod: CPTII,S$GLB,, | Performed by: INTERNAL MEDICINE

## 2022-09-09 PROCEDURE — 3074F PR MOST RECENT SYSTOLIC BLOOD PRESSURE < 130 MM HG: ICD-10-PCS | Mod: CPTII,S$GLB,, | Performed by: INTERNAL MEDICINE

## 2022-09-09 PROCEDURE — 1159F MED LIST DOCD IN RCRD: CPT | Mod: CPTII,S$GLB,, | Performed by: INTERNAL MEDICINE

## 2022-09-09 PROCEDURE — 99213 PR OFFICE/OUTPT VISIT, EST, LEVL III, 20-29 MIN: ICD-10-PCS | Mod: S$GLB,,, | Performed by: INTERNAL MEDICINE

## 2022-09-09 PROCEDURE — 3078F DIAST BP <80 MM HG: CPT | Mod: CPTII,S$GLB,, | Performed by: INTERNAL MEDICINE

## 2022-09-09 PROCEDURE — 93005 EKG 12-LEAD: ICD-10-PCS | Mod: S$GLB,,, | Performed by: INTERNAL MEDICINE

## 2022-09-09 PROCEDURE — 99213 OFFICE O/P EST LOW 20 MIN: CPT | Mod: S$GLB,,, | Performed by: INTERNAL MEDICINE

## 2022-09-09 PROCEDURE — 93351 STRESS ECHO (CUPID ONLY): ICD-10-PCS | Mod: 26,,, | Performed by: INTERNAL MEDICINE

## 2022-09-09 PROCEDURE — 1160F RVW MEDS BY RX/DR IN RCRD: CPT | Mod: CPTII,S$GLB,, | Performed by: INTERNAL MEDICINE

## 2022-09-09 PROCEDURE — 3078F PR MOST RECENT DIASTOLIC BLOOD PRESSURE < 80 MM HG: ICD-10-PCS | Mod: CPTII,S$GLB,, | Performed by: INTERNAL MEDICINE

## 2022-09-09 PROCEDURE — 99999 PR PBB SHADOW E&M-EST. PATIENT-LVL IV: CPT | Mod: PBBFAC,,, | Performed by: INTERNAL MEDICINE

## 2022-09-09 PROCEDURE — 93351 STRESS TTE COMPLETE: CPT | Mod: 26,,, | Performed by: INTERNAL MEDICINE

## 2022-09-09 PROCEDURE — 3008F BODY MASS INDEX DOCD: CPT | Mod: CPTII,S$GLB,, | Performed by: INTERNAL MEDICINE

## 2022-09-09 PROCEDURE — 99999 PR PBB SHADOW E&M-EST. PATIENT-LVL IV: ICD-10-PCS | Mod: PBBFAC,,, | Performed by: INTERNAL MEDICINE

## 2022-09-09 PROCEDURE — 93010 ELECTROCARDIOGRAM REPORT: CPT | Mod: S$GLB,,, | Performed by: INTERNAL MEDICINE

## 2022-09-09 PROCEDURE — 93351 STRESS TTE COMPLETE: CPT

## 2022-09-10 NOTE — PROGRESS NOTES
CC:  Left arm numbness and tingling    HPI:  Patient is a 47-year-old male with a history of gout who presents today with complaints of tingling and numbness involving the left arm.  Symptoms started 1 month ago.  He has multiple episodes throughout the day.  Symptom lasts just a few seconds.  Now progressing to the left chest.  Is not interfered with Aks of daily living or exercise.      ROS:  Patient does report some weight gain but has managed to lose 5 lb over the past few weeks.  He reports a few episodes while here in clinic of this arm pain.  Otherwise, please see patient entered review of systems.  The patient did complain of chest tightness.    Physical exam:   General appearance:  No acute distress   HEENT:  Trachea is midline without JVD   Pulmonary:  Good inspiratory, expiratory breath sounds are heard.  Lungs clear auscultation.    Cardiovascular:  S1-S2, rhythm is normal.  Extremities without edema.    GI: Abdomen is nontender, nondistended without hepatosplenomegaly  Neuro:  A cervical spine exam was performed.  The patient had normal range of motion with flexion, extension, lateral bending.  , upper extremity motor strength is 5 x 5 bilaterally.  The patient had a negative Phalen and a negative Tinel sign  Comments: EKG done here in clinic which showed normal sinus rhythm    Assessment:  1. Left chest pain     Plan:  1. Will order stress echo  2.  The patient will follow-up pending test results.

## 2022-10-04 ENCOUNTER — OFFICE VISIT (OUTPATIENT)
Dept: URGENT CARE | Facility: CLINIC | Age: 47
End: 2022-10-04
Payer: COMMERCIAL

## 2022-10-04 VITALS
OXYGEN SATURATION: 96 % | HEART RATE: 94 BPM | BODY MASS INDEX: 26.46 KG/M2 | TEMPERATURE: 98 F | RESPIRATION RATE: 20 BRPM | SYSTOLIC BLOOD PRESSURE: 132 MMHG | HEIGHT: 71 IN | WEIGHT: 189 LBS | DIASTOLIC BLOOD PRESSURE: 94 MMHG

## 2022-10-04 DIAGNOSIS — L30.9 DERMATITIS: Primary | ICD-10-CM

## 2022-10-04 PROCEDURE — 3075F SYST BP GE 130 - 139MM HG: CPT | Mod: CPTII,S$GLB,, | Performed by: NURSE PRACTITIONER

## 2022-10-04 PROCEDURE — 99213 PR OFFICE/OUTPT VISIT, EST, LEVL III, 20-29 MIN: ICD-10-PCS | Mod: S$GLB,,, | Performed by: NURSE PRACTITIONER

## 2022-10-04 PROCEDURE — 3075F PR MOST RECENT SYSTOLIC BLOOD PRESS GE 130-139MM HG: ICD-10-PCS | Mod: CPTII,S$GLB,, | Performed by: NURSE PRACTITIONER

## 2022-10-04 PROCEDURE — 3008F PR BODY MASS INDEX (BMI) DOCUMENTED: ICD-10-PCS | Mod: CPTII,S$GLB,, | Performed by: NURSE PRACTITIONER

## 2022-10-04 PROCEDURE — 99213 OFFICE O/P EST LOW 20 MIN: CPT | Mod: S$GLB,,, | Performed by: NURSE PRACTITIONER

## 2022-10-04 PROCEDURE — 3080F DIAST BP >= 90 MM HG: CPT | Mod: CPTII,S$GLB,, | Performed by: NURSE PRACTITIONER

## 2022-10-04 PROCEDURE — 1159F MED LIST DOCD IN RCRD: CPT | Mod: CPTII,S$GLB,, | Performed by: NURSE PRACTITIONER

## 2022-10-04 PROCEDURE — 3008F BODY MASS INDEX DOCD: CPT | Mod: CPTII,S$GLB,, | Performed by: NURSE PRACTITIONER

## 2022-10-04 PROCEDURE — 1159F PR MEDICATION LIST DOCUMENTED IN MEDICAL RECORD: ICD-10-PCS | Mod: CPTII,S$GLB,, | Performed by: NURSE PRACTITIONER

## 2022-10-04 PROCEDURE — 3080F PR MOST RECENT DIASTOLIC BLOOD PRESSURE >= 90 MM HG: ICD-10-PCS | Mod: CPTII,S$GLB,, | Performed by: NURSE PRACTITIONER

## 2022-10-04 RX ORDER — TRIAMCINOLONE ACETONIDE 5 MG/G
CREAM TOPICAL 2 TIMES DAILY
Qty: 15 G | Refills: 0 | Status: SHIPPED | OUTPATIENT
Start: 2022-10-04 | End: 2024-01-04

## 2022-10-04 RX ORDER — HYDROCORTISONE 25 MG/G
CREAM TOPICAL 2 TIMES DAILY
Qty: 20 G | Refills: 0 | Status: SHIPPED | OUTPATIENT
Start: 2022-10-04 | End: 2024-01-04

## 2022-10-04 NOTE — PROGRESS NOTES
"Subjective:       Patient ID: Jarod Arias Sr. is a 47 y.o. male.    Vitals:  height is 5' 11" (1.803 m) and weight is 85.7 kg (189 lb). His oral temperature is 97.9 °F (36.6 °C). His blood pressure is 132/94 (abnormal) and his pulse is 94. His respiration is 20 and oxygen saturation is 96%.     Chief Complaint: Rash (Entered by patient)    Pt states he has had the rash for two weeks. Pt states he originally thought he had a mosquito bite on his ankle and normal itching but realized that it was a rash and the it is getting worse. Pt states it itches stills but is not painful and he has not tried anything for relief but wanted it to be evaluated to assure it wont get worse. Pt has not other rashes any where else just the right ankle and denies any other symptoms.     Rash  This is a new problem. The current episode started 1 to 4 weeks ago. The problem has been gradually worsening since onset. The affected locations include the right ankle. The rash is characterized by redness and itchiness. It is unknown if there was an exposure to a precipitant. Pertinent negatives include no anorexia, congestion, cough, diarrhea, eye pain, facial edema, fatigue, fever, joint pain, nail changes, rhinorrhea, shortness of breath, sore throat or vomiting. Past treatments include nothing. The treatment provided no relief.     Constitution: Negative for fatigue and fever.   HENT:  Negative for congestion and sore throat.    Eyes:  Negative for eye pain.   Respiratory:  Negative for cough and shortness of breath.    Gastrointestinal:  Negative for vomiting and diarrhea.   Skin:  Positive for rash.     Objective:      Physical Exam   Constitutional:  Non-toxic appearance. He does not appear ill. No distress.   HENT:   Head: Normocephalic and atraumatic.   Ears:   Right Ear: External ear normal.   Left Ear: External ear normal.   Abdominal: Normal appearance.   Neurological: He is alert.   Skin: Skin is warm, intact, not " diaphoretic, rash and maculopapular.        Nursing note and vitals reviewed.      Assessment:       1. Dermatitis          Plan:         Dermatitis  -     triamcinolone acetonide 0.5% (KENALOG) 0.5 % Crea; Apply topically 2 (two) times daily.  Dispense: 15 g; Refill: 0  -     hydrocortisone 2.5 % cream; Apply topically 2 (two) times daily.  Dispense: 20 g; Refill: 0               Patient Instructions   General Discharge Instructions   If you were prescribed a narcotic or controlled medication, do not drive or operate heavy equipment or machinery while taking these medications.  If you were prescribed antibiotics, please take them to completion.  You must understand that you've received an Urgent Care treatment only and that you may be released before all your medical problems are known or treated. You, the patient, will arrange for follow up care as instructed.  Follow up with your PCP or specialty clinic as directed in the next 1-2 weeks if not improved or as needed.  You can call [phone] to schedule an appointment with the appropriate provider.  If your condition worsens we recommend that you receive another evaluation at the emergency room immediately or contact your primary medical clinics after hours call service to discuss your concerns.  Please return here or go to the Emergency Department for any concerns or worsening of condition.      WE CANNOT RULE OUT ALL POSSIBLE CAUSES OF YOUR SYMPTOMS IN THE URGENT CARE SETTING PLEASE GO TO THE ER IF YOU FEELS YOUR CONDITION IS WORSENING OR YOU WOULD LIKE EMERGENT EVALUATION.

## 2022-10-06 NOTE — PATIENT INSTRUCTIONS
General Discharge Instructions   If you were prescribed a narcotic or controlled medication, do not drive or operate heavy equipment or machinery while taking these medications.  If you were prescribed antibiotics, please take them to completion.  You must understand that you've received an Urgent Care treatment only and that you may be released before all your medical problems are known or treated. You, the patient, will arrange for follow up care as instructed.  Follow up with your PCP or specialty clinic as directed in the next 1-2 weeks if not improved or as needed.  You can call [phone] to schedule an appointment with the appropriate provider.  If your condition worsens we recommend that you receive another evaluation at the emergency room immediately or contact your primary medical clinics after hours call service to discuss your concerns.  Please return here or go to the Emergency Department for any concerns or worsening of condition.      WE CANNOT RULE OUT ALL POSSIBLE CAUSES OF YOUR SYMPTOMS IN THE URGENT CARE SETTING PLEASE GO TO THE ER IF YOU FEELS YOUR CONDITION IS WORSENING OR YOU WOULD LIKE EMERGENT EVALUATION.

## 2023-02-23 ENCOUNTER — PATIENT MESSAGE (OUTPATIENT)
Dept: INTERNAL MEDICINE | Facility: CLINIC | Age: 48
End: 2023-02-23
Payer: COMMERCIAL

## 2023-02-23 RX ORDER — ALPRAZOLAM 0.5 MG/1
0.5 TABLET ORAL DAILY PRN
Qty: 10 TABLET | Refills: 0 | Status: SHIPPED | OUTPATIENT
Start: 2023-02-23 | End: 2023-12-12 | Stop reason: SDUPTHER

## 2023-04-01 ENCOUNTER — PATIENT MESSAGE (OUTPATIENT)
Dept: ADMINISTRATIVE | Facility: HOSPITAL | Age: 48
End: 2023-04-01
Payer: COMMERCIAL

## 2023-04-01 DIAGNOSIS — Z12.11 SCREENING FOR COLON CANCER: ICD-10-CM

## 2023-04-21 ENCOUNTER — PATIENT MESSAGE (OUTPATIENT)
Dept: ADMINISTRATIVE | Facility: HOSPITAL | Age: 48
End: 2023-04-21
Payer: COMMERCIAL

## 2023-05-02 LAB — HEMOCCULT STL QL IA: NEGATIVE

## 2023-05-19 ENCOUNTER — OFFICE VISIT (OUTPATIENT)
Dept: PRIMARY CARE CLINIC | Facility: CLINIC | Age: 48
End: 2023-05-19
Payer: COMMERCIAL

## 2023-05-19 ENCOUNTER — LAB VISIT (OUTPATIENT)
Dept: LAB | Facility: HOSPITAL | Age: 48
End: 2023-05-19
Attending: INTERNAL MEDICINE
Payer: COMMERCIAL

## 2023-05-19 ENCOUNTER — PATIENT MESSAGE (OUTPATIENT)
Dept: PRIMARY CARE CLINIC | Facility: CLINIC | Age: 48
End: 2023-05-19

## 2023-05-19 ENCOUNTER — HOSPITAL ENCOUNTER (OUTPATIENT)
Dept: RADIOLOGY | Facility: OTHER | Age: 48
Discharge: HOME | End: 2023-05-19
Attending: INTERNAL MEDICINE
Payer: COMMERCIAL

## 2023-05-19 VITALS
SYSTOLIC BLOOD PRESSURE: 122 MMHG | WEIGHT: 178.81 LBS | DIASTOLIC BLOOD PRESSURE: 82 MMHG | HEART RATE: 114 BPM | OXYGEN SATURATION: 96 % | HEIGHT: 71 IN | BODY MASS INDEX: 25.03 KG/M2

## 2023-05-19 DIAGNOSIS — R10.9 ABDOMINAL PAIN, UNSPECIFIED ABDOMINAL LOCATION: ICD-10-CM

## 2023-05-19 DIAGNOSIS — R10.13 EPIGASTRIC PAIN: ICD-10-CM

## 2023-05-19 DIAGNOSIS — M54.31 RIGHT SIDED SCIATICA: ICD-10-CM

## 2023-05-19 DIAGNOSIS — R10.9 ABDOMINAL PAIN, UNSPECIFIED ABDOMINAL LOCATION: Primary | ICD-10-CM

## 2023-05-19 DIAGNOSIS — R50.9 FEVER, UNSPECIFIED FEVER CAUSE: ICD-10-CM

## 2023-05-19 DIAGNOSIS — R11.2 NAUSEA AND VOMITING, UNSPECIFIED VOMITING TYPE: ICD-10-CM

## 2023-05-19 LAB
ALBUMIN SERPL BCP-MCNC: 4.2 G/DL (ref 3.5–5.2)
ALP SERPL-CCNC: 174 U/L (ref 55–135)
ALT SERPL W/O P-5'-P-CCNC: 759 U/L (ref 10–44)
AMYLASE SERPL-CCNC: 81 U/L (ref 20–110)
ANION GAP SERPL CALC-SCNC: 9 MMOL/L (ref 8–16)
AST SERPL-CCNC: 403 U/L (ref 10–40)
BASOPHILS # BLD AUTO: 0.04 K/UL (ref 0–0.2)
BASOPHILS NFR BLD: 0.5 % (ref 0–1.9)
BILIRUB SERPL-MCNC: 2.7 MG/DL (ref 0.1–1)
BUN SERPL-MCNC: 9 MG/DL (ref 6–20)
CALCIUM SERPL-MCNC: 9 MG/DL (ref 8.7–10.5)
CHLORIDE SERPL-SCNC: 102 MMOL/L (ref 95–110)
CO2 SERPL-SCNC: 26 MMOL/L (ref 23–29)
CREAT SERPL-MCNC: 1.2 MG/DL (ref 0.5–1.4)
DIFFERENTIAL METHOD: ABNORMAL
EOSINOPHIL # BLD AUTO: 0 K/UL (ref 0–0.5)
EOSINOPHIL NFR BLD: 0.4 % (ref 0–8)
ERYTHROCYTE [DISTWIDTH] IN BLOOD BY AUTOMATED COUNT: 14.9 % (ref 11.5–14.5)
EST. GFR  (NO RACE VARIABLE): >60 ML/MIN/1.73 M^2
GLUCOSE SERPL-MCNC: 102 MG/DL (ref 70–110)
HCT VFR BLD AUTO: 48.5 % (ref 40–54)
HGB BLD-MCNC: 15.4 G/DL (ref 14–18)
IMM GRANULOCYTES # BLD AUTO: 0.03 K/UL (ref 0–0.04)
IMM GRANULOCYTES NFR BLD AUTO: 0.4 % (ref 0–0.5)
LIPASE SERPL-CCNC: 21 U/L (ref 4–60)
LYMPHOCYTES # BLD AUTO: 0.6 K/UL (ref 1–4.8)
LYMPHOCYTES NFR BLD: 7.4 % (ref 18–48)
MCH RBC QN AUTO: 28.7 PG (ref 27–31)
MCHC RBC AUTO-ENTMCNC: 31.8 G/DL (ref 32–36)
MCV RBC AUTO: 91 FL (ref 82–98)
MONOCYTES # BLD AUTO: 0.6 K/UL (ref 0.3–1)
MONOCYTES NFR BLD: 8.3 % (ref 4–15)
NEUTROPHILS # BLD AUTO: 6.2 K/UL (ref 1.8–7.7)
NEUTROPHILS NFR BLD: 83 % (ref 38–73)
NRBC BLD-RTO: 0 /100 WBC
PLATELET # BLD AUTO: 242 K/UL (ref 150–450)
PMV BLD AUTO: 9.3 FL (ref 9.2–12.9)
POTASSIUM SERPL-SCNC: 4 MMOL/L (ref 3.5–5.1)
PROT SERPL-MCNC: 7.5 G/DL (ref 6–8.4)
RBC # BLD AUTO: 5.36 M/UL (ref 4.6–6.2)
SODIUM SERPL-SCNC: 137 MMOL/L (ref 136–145)
WBC # BLD AUTO: 7.43 K/UL (ref 3.9–12.7)

## 2023-05-19 PROCEDURE — 99999 PR PBB SHADOW E&M-EST. PATIENT-LVL IV: ICD-10-PCS | Mod: PBBFAC,,, | Performed by: INTERNAL MEDICINE

## 2023-05-19 PROCEDURE — 3008F PR BODY MASS INDEX (BMI) DOCUMENTED: ICD-10-PCS | Mod: CPTII,S$GLB,, | Performed by: INTERNAL MEDICINE

## 2023-05-19 PROCEDURE — 99214 OFFICE O/P EST MOD 30 MIN: CPT | Mod: S$GLB,,, | Performed by: INTERNAL MEDICINE

## 2023-05-19 PROCEDURE — 3074F PR MOST RECENT SYSTOLIC BLOOD PRESSURE < 130 MM HG: ICD-10-PCS | Mod: CPTII,S$GLB,, | Performed by: INTERNAL MEDICINE

## 2023-05-19 PROCEDURE — 85025 COMPLETE CBC W/AUTO DIFF WBC: CPT | Performed by: INTERNAL MEDICINE

## 2023-05-19 PROCEDURE — 83690 ASSAY OF LIPASE: CPT | Performed by: INTERNAL MEDICINE

## 2023-05-19 PROCEDURE — 80053 COMPREHEN METABOLIC PANEL: CPT | Performed by: INTERNAL MEDICINE

## 2023-05-19 PROCEDURE — 99999 PR PBB SHADOW E&M-EST. PATIENT-LVL IV: CPT | Mod: PBBFAC,,, | Performed by: INTERNAL MEDICINE

## 2023-05-19 PROCEDURE — A9698 NON-RAD CONTRAST MATERIALNOC: HCPCS | Performed by: INTERNAL MEDICINE

## 2023-05-19 PROCEDURE — 99214 PR OFFICE/OUTPT VISIT, EST, LEVL IV, 30-39 MIN: ICD-10-PCS | Mod: S$GLB,,, | Performed by: INTERNAL MEDICINE

## 2023-05-19 PROCEDURE — 3079F PR MOST RECENT DIASTOLIC BLOOD PRESSURE 80-89 MM HG: ICD-10-PCS | Mod: CPTII,S$GLB,, | Performed by: INTERNAL MEDICINE

## 2023-05-19 PROCEDURE — 3079F DIAST BP 80-89 MM HG: CPT | Mod: CPTII,S$GLB,, | Performed by: INTERNAL MEDICINE

## 2023-05-19 PROCEDURE — 3074F SYST BP LT 130 MM HG: CPT | Mod: CPTII,S$GLB,, | Performed by: INTERNAL MEDICINE

## 2023-05-19 PROCEDURE — 74177 CT ABD & PELVIS W/CONTRAST: CPT | Mod: TC

## 2023-05-19 PROCEDURE — 36415 COLL VENOUS BLD VENIPUNCTURE: CPT | Performed by: INTERNAL MEDICINE

## 2023-05-19 PROCEDURE — 3008F BODY MASS INDEX DOCD: CPT | Mod: CPTII,S$GLB,, | Performed by: INTERNAL MEDICINE

## 2023-05-19 PROCEDURE — 25500020 PHARM REV CODE 255: Performed by: INTERNAL MEDICINE

## 2023-05-19 PROCEDURE — 82150 ASSAY OF AMYLASE: CPT | Performed by: INTERNAL MEDICINE

## 2023-05-19 PROCEDURE — 74177 CT ABD & PELVIS W/CONTRAST: CPT | Mod: 26,,, | Performed by: RADIOLOGY

## 2023-05-19 PROCEDURE — 74177 CT ABDOMEN PELVIS WITH CONTRAST: ICD-10-PCS | Mod: 26,,, | Performed by: RADIOLOGY

## 2023-05-19 RX ORDER — ONDANSETRON 4 MG/1
4 TABLET, ORALLY DISINTEGRATING ORAL EVERY 8 HOURS PRN
Qty: 16 TABLET | Refills: 0 | Status: SHIPPED | OUTPATIENT
Start: 2023-05-19 | End: 2024-01-04

## 2023-05-19 RX ORDER — CIPROFLOXACIN 500 MG/1
500 TABLET ORAL EVERY 12 HOURS
Qty: 14 TABLET | Refills: 0 | Status: SHIPPED | OUTPATIENT
Start: 2023-05-19 | End: 2024-01-04

## 2023-05-19 RX ADMIN — IOHEXOL 100 ML: 350 INJECTION, SOLUTION INTRAVENOUS at 06:05

## 2023-05-19 RX ADMIN — IOHEXOL 1000 ML: 9 SOLUTION ORAL at 06:05

## 2023-05-19 NOTE — PROGRESS NOTES
[facility] Note    Chief Complaint      Chief Complaint   Patient presents with    Abdominal Pain       History of Present Illness      Jarod Smithben Boswell. is a 47 y.o. male pt of Dr. Schofield with chronic conditions of  who presents today for: complaints of fever and abdominal.  Reports epigastric/periumbilical pain, stabbing in character, nausea, fever.  Started 2 days ago.  Improved with BM, which were solid.  Denies hematochezia, melena, vomited.  Onset not related to eating, not worsened by eating.  Last fever as of this morning. No other sick contacts at home.     Also complains of right lower flank pain, radiates down right leg.  Present intermittently for the past few years, treated with PT in the past successfully.  This episode brought on by long car ride 5/5.  Interested in restarting PT as pain is not resolving on its own.   Also has a superficial abrasion on right shin that is healing without complication or signs of infection.     Past Medical History:  Past Medical History:   Diagnosis Date    Allergy     Elevated blood pressure reading without diagnosis of hypertension     Gout     Left wrist fracture        Past Surgical History:   has a past surgical history that includes [place] tooth extraction and Pilonidal cyst drainage.    Family History:  family history includes Allergic rhinitis in his father; COPD in his mother; Cancer in his maternal grandfather, maternal grandmother, and paternal grandfather.     Social History:  Social History     Tobacco Use    Smoking status: Never    Smokeless tobacco: Former   Substance Use Topics    Alcohol use: Yes     Alcohol/week: 2.0 standard drinks     Types: 2 Drinks containing 0.5 oz of alcohol per week    Drug use: No       I personally reviewed all past medical, surgical, social and family history.    Review of Systems   Constitutional:  Negative for chills, fever and malaise/fatigue.   Respiratory:  Negative for shortness of breath.     Cardiovascular:  Negative for chest pain.   Gastrointestinal:  Negative for constipation, diarrhea, nausea and vomiting.   Skin:  Negative for rash.   Neurological:  Negative for weakness.   All other systems reviewed and are negative.     Medications:  Outpatient Encounter Medications as of 5/19/2023   Medication Sig Dispense Refill    allopurinoL (ZYLOPRIM) 300 MG tablet Take 1 tablet (300 mg total) by mouth once daily. 90 tablet 3    ALPRAZolam (XANAX) 0.5 MG tablet Take 1 tablet (0.5 mg total) by mouth daily as needed for Anxiety. 10 tablet 0    azelastine (ASTELIN) 137 mcg (0.1 %) nasal spray 2 sprays (274 mcg total) by Nasal route 2 (two) times daily. 30 mL 12    cetirizine (ZYRTEC) 10 MG tablet Take 10 mg by mouth once daily.      diphenhydrAMINE (BENADRYL) 25 mg capsule Take 25 mg by mouth every 6 (six) hours as needed for Itching.      fluticasone propionate (FLONASE) 50 mcg/actuation nasal spray 1 spray by Each Nare route once daily.      hydrocortisone 2.5 % cream Apply topically 2 (two) times daily. 20 g 0    pseudoephedrine (SUDAFED) 30 MG tablet Take 30 mg by mouth every 4 (four) hours as needed for Congestion.      triamcinolone acetonide 0.5% (KENALOG) 0.5 % Crea Apply topically 2 (two) times daily. 15 g 0     No facility-administered encounter medications on file as of 5/19/2023.       Allergies:  Review of patient's allergies indicates:   Allergen Reactions    No known drug allergies        Health Maintenance:  Immunization History   Administered Date(s) Administered    COVID-19, MRNA, LN-S, PF (Pfizer) (Purple Cap) 04/20/2021, 05/11/2021    Influenza - Quadrivalent - PF *Preferred* (6 months and older) 01/25/2019    Influenza - Trivalent (ADULT) 10/26/2011    Influenza Split 10/26/2011      Health Maintenance   Topic Date Due    Hepatitis C Screening  Never done    TETANUS VACCINE  Never done    Lipid Panel  09/21/2026        Physical Exam      Vital Signs  Pulse: (!) 114  SpO2: 96 %  BP:  "122/82  Pain Score:   4  Pain Loc: Head  Height and Weight  Height: 5' 11" (180.3 cm)  Weight: 81.1 kg (178 lb 12.7 oz)  BSA (Calculated - sq m): 2.02 sq meters  BMI (Calculated): 24.9  Weight in (lb) to have BMI = 25: 178.9]    Physical Exam  Vitals reviewed.   Constitutional:       Appearance: He is well-developed.   HENT:      Head: Normocephalic and atraumatic.      Right Ear: External ear normal.      Left Ear: External ear normal.   Cardiovascular:      Rate and Rhythm: Normal rate and regular rhythm.      Heart sounds: Normal heart sounds. No murmur heard.  Pulmonary:      Effort: Pulmonary effort is normal.      Breath sounds: Normal breath sounds. No wheezing or rales.   Abdominal:      General: Bowel sounds are normal.      Palpations: Abdomen is soft.        Laboratory:  CBC:  Recent Labs   Lab 08/18/20  0843 09/21/21  1300 07/22/22  1056   WBC 4.75 7.23 4.68   RBC 5.19 5.44 5.21   Hemoglobin 15.2 15.9 16.1   Hematocrit 47.8 48.5 46.7   Platelets 279 298 276   MCV 92 89 90   MCH 29.3 29.2 30.9   MCHC 31.8 L 32.8 34.5     CMP:  Recent Labs   Lab 08/18/20  0843 09/21/21  1300 07/22/22  1056   Glucose 91 91 100   Calcium 9.4 9.8 9.6   Albumin 4.4 4.7 4.5   Total Protein 6.9 7.4 6.8   Sodium 142 142 143   Potassium 4.4 4.3 5.0   CO2 27 25 28   Chloride 107 106 108   BUN 12 11 11   Alkaline Phosphatase 51 L 50 L 41 L   ALT 14 20 12   AST 20 21 15   Total Bilirubin 0.6 0.8 0.9     URINALYSIS:  Recent Labs   Lab 09/21/21  1240   Color, UA Straw   Specific [place], UA 1.010   pH, UA 7.0   Protein, UA Negative   Nitrite, UA Negative   Leukocytes, UA Negative      LIPIDS:  Recent Labs   Lab 08/18/20  0843 09/21/21  1300   HDL 56 66   Cholesterol 213 H 213 H   Triglycerides 98 100   LDL Cholesterol 137.4 127.0   HDL/Cholesterol Ratio 26.3 31.0   Non-HDL Cholesterol 157 147   Total Cholesterol/HDL Ratio 3.8 3.2     TSH:      A1C:        Assessment/Plan     Jarod Apple Juana Fontanez is a 47 y.o.male with:    1. " Abdominal pain, unspecified abdominal location  - CT Abdomen Pelvis With Contrast; Future  - Comprehensive Metabolic Panel; Future  - CBC Auto Differential; Future  - Urinalysis, Reflex to Urine Culture Urine, Clean Catch; Future  - AMYLASE; Future  - LIPASE; Future  2. Fever, unspecified fever cause  - CT Abdomen Pelvis With Contrast; Future  - Comprehensive Metabolic Panel; Future  - CBC Auto Differential; Future  - Urinalysis, Reflex to Urine Culture Urine, Clean Catch; Future  - AMYLASE; Future  - LIPASE; Future  3. Right sided sciatica  - Ambulatory referral/consult to Physical/Occupational Therapy; Future  4. Epigastric pain  - CT Abdomen Pelvis With Contrast; Future  - Comprehensive Metabolic Panel; Future  - CBC Auto Differential; Future  - Urinalysis, Reflex to Urine Culture Urine, Clean Catch; Future  - AMYLASE; Future  - LIPASE; Future  5. Nausea and vomiting, unspecified vomiting type  - CT Abdomen Pelvis With Contrast; Future  - Comprehensive Metabolic Panel; Future  - CBC Auto Differential; Future  - Urinalysis, Reflex to Urine Culture Urine, Clean Catch; Future  - AMYLASE; Future  - LIPASE; Future    Check labs and ordering CT to further evaluate.  Likely viral gastroenteritis but ruling out pancreatitis, cholecystitis, kidney stones, etc.  Giving zofran and cipro to hold for worsening symptoms.  F/U with Dr. Schofield if symptoms persisting.  ER if symptoms severe.       Chronic conditions status updated as per HPI.  Other than changes above, cont current medications and maintain follow up with specialists.  No follow-ups on file.    No future appointments.    Keon Metz MD  [facility]

## 2023-05-22 ENCOUNTER — PATIENT MESSAGE (OUTPATIENT)
Dept: PRIMARY CARE CLINIC | Facility: CLINIC | Age: 48
End: 2023-05-22
Payer: COMMERCIAL

## 2023-05-23 NOTE — PROGRESS NOTES
CT scan shows no significant abnormalities that might explain his symptoms.  Symptoms likely due to viral gastroenteritis as discussed during visit.  Non-urgent findings of fatty liver infiltration and diverticulosis can be followed up with PCP at next visit.

## 2023-05-25 ENCOUNTER — CLINICAL SUPPORT (OUTPATIENT)
Dept: REHABILITATION | Facility: HOSPITAL | Age: 48
End: 2023-05-25
Attending: INTERNAL MEDICINE
Payer: COMMERCIAL

## 2023-05-25 DIAGNOSIS — M53.86 DECREASED RANGE OF MOTION OF INTERVERTEBRAL DISCS OF LUMBAR SPINE: ICD-10-CM

## 2023-05-25 DIAGNOSIS — M54.31 RIGHT SIDED SCIATICA: ICD-10-CM

## 2023-05-25 DIAGNOSIS — R29.898 WEAKNESS OF RIGHT LOWER EXTREMITY: ICD-10-CM

## 2023-05-25 DIAGNOSIS — M25.651 DECREASED RANGE OF RIGHT HIP MOVEMENT: ICD-10-CM

## 2023-05-25 DIAGNOSIS — M62.81 WEAKNESS OF TRUNK MUSCULATURE: ICD-10-CM

## 2023-05-25 PROCEDURE — 97161 PT EVAL LOW COMPLEX 20 MIN: CPT | Mod: PO

## 2023-05-25 PROCEDURE — 97140 MANUAL THERAPY 1/> REGIONS: CPT | Mod: PO

## 2023-05-25 PROCEDURE — 97110 THERAPEUTIC EXERCISES: CPT | Mod: PO

## 2023-05-26 PROBLEM — M53.86 DECREASED RANGE OF MOTION OF INTERVERTEBRAL DISCS OF LUMBAR SPINE: Status: ACTIVE | Noted: 2023-05-26

## 2023-05-26 PROBLEM — M62.81 WEAKNESS OF TRUNK MUSCULATURE: Status: ACTIVE | Noted: 2023-05-26

## 2023-05-26 PROBLEM — R29.898 WEAKNESS OF RIGHT LOWER EXTREMITY: Status: ACTIVE | Noted: 2023-05-26

## 2023-05-26 PROBLEM — M25.651 DECREASED RANGE OF RIGHT HIP MOVEMENT: Status: ACTIVE | Noted: 2023-05-26

## 2023-05-26 NOTE — PLAN OF CARE
[facility]  Physical Therapy Initial Evaluation    Date: 5/25/2023   Name: Jarod Arias Sr.  Clinic Number: [id number]    Therapy Diagnosis:   Encounter Diagnosis   Name Primary?    Right sided sciatica      Physician: Keon Metz MD    Physician Orders: PT eval and treat  Medical Diagnosis: M54.31 (ICD-10-CM) - Right sided sciatica  Evaluation Date: 5/25/23  Authorization Period Expiration: 12/31/23  Plan of Care Certification Period: 8/31/23  Progress Note Due: 6/25/23    Visit # / Visits authorized: 1/ 1   FOTO: 1/ 3   PTA Visit #: 0/5     Time In: 410 pm  Time Out: 450 pm  Total Appointment Time (timed & untimed codes): 40 minutes low complex eval. MT 1 TE 1    Precautions: Standard    Subjective   Date of onset: due to long car ride + 3 weeks ago.   History of current condition - Jarod reports: per 5/19/23 PCP visit:     Jarod Arias  is a 47 y.o. male pt of Dr. Schofield with chronic conditions of  who presents today for: complaints of fever and abdominal.  Reports epigastric/periumbilical pain, stabbing in character, nausea, fever.  Started 2 days ago.  Improved with BM, which were solid.  Denies hematochezia, melena, vomited.  Onset not related to eating, not worsened by eating.  Last fever as of this morning. No other sick contacts at home.     Also complains of right lower flank pain, radiates down right leg.  Present intermittently for the past few years, treated with PT in the past successfully.  This episode brought on by long car ride 5/5.  Interested in restarting PT as pain is not resolving on its own.   Also has a superficial abrasion on right shin that is healing without complication or signs of infection.   Medical History:   Past Medical History:   Diagnosis Date    Allergy     Elevated blood pressure reading without diagnosis of hypertension     Gout     Left wrist fracture        Surgical History:   Jarod Smithben Fontanez  has a past surgical  history that includes [place] tooth extraction and Pilonidal cyst drainage.    Medications:   Jarod has a current medication list which includes the following prescription(s): allopurinol, alprazolam, azelastine, cetirizine, ciprofloxacin hcl, diphenhydramine, fluticasone propionate, hydrocortisone, ondansetron, pseudoephedrine, and triamcinolone acetonide 0.5%.    Allergies:   Review of patient's allergies indicates:   Allergen Reactions    No known drug allergies         Imaging,   CLINICAL HISTORY:  Nausea/vomiting;Epigastric pain; Epigastric pain     TECHNIQUE:  Low dose axial images, sagittal and coronal reformations were obtained from the lung bases to the pubic symphysis following the IV administration of 100 mL of Omnipaque 350 and the oral administration of 1000 mL of Omnipaque 9.     COMPARISON:  None.     FINDINGS:  The visualized portion of the heart is unremarkable.  The lung bases are clear.     Liver is mildly enlarged measuring 19.5 cm.  Few small scattered right hepatic lobe hypodensities are seen which are too small to characterize but may represent small cysts..  Spleen is mildly enlarged measuring 13.5 cm.  There is no intra-or extrahepatic biliary ductal dilatation.  The gallbladder is unremarkable.  The stomach, pancreas, and adrenal glands are unremarkable.     Kidneys enhance normally with no evidence of hydronephrosis.  No abnormalities are seen along the ureteral courses.  Urinary bladder is nondistended.  Prostate is unremarkable.     Appendix is visualized and is unremarkable.  The visualized loops of small and large bowel show no evidence of obstruction or inflammation.  There is sigmoid diverticulosis.  No free air or free fluid.     Aorta tapers normally.     No acute osseous abnormality identified. Subcutaneous soft tissues show no significant abnormalities.     Impression:     1. No acute intra-abdominal abnormalities identified.  2. Sigmoid diverticulosis with no CT evidence of acute  diverticulitis.  3. Mild hepatosplenomegaly.  4. Additional findings as detailed above.        Electronically signed by: Yonas Shepard MD  Date:                                            05/19/2023  Time:                                           19:08           Exam Ended: 05/19/23 18:59 Last Resulted: 05/19/23 19:08         :    Prior Therapy: yes  Social History: no steps  lives with their family and dogs  Occupation:  6-8 hrs sitting  Prior Level of Function: community ambulation, soft ball , daily 20 min walking  Current Level of Function: same but limited with recreational exercise.     Pain:  Current 4/10, worst 8/10, best 2/10   Location: right back  and buttocks   Description: Tight and Shooting  Aggravating Factors: Sitting  Easing Factors: relaxation and heating pad    Pts goals: to resolve pain and return to walking program    Objective     Lumbar ROM: (measured in degrees)    Degrees Quality   flexion   85 Painful into and out of end range   extension   25    Rotation R +65    Rotation L +65      Active/Passive Hip ROM: (measured in degrees)    RLE LLE   Flexion 110/ 120/   Extension 24/ 30/     Lower Extremity Strength (graded 0-5 out of 5)   RLE LLE   Hip flexion: 4+/5 5/5   Hip extension: 4/5 4+/5             Knee flexion 5/5 5/5   Knee extension 5/5 5/5   Hip adduction 5/5 5/5   Hip abduction 4+/5 4+/5     Special Tests: ((+): pos.; (-): neg.)   Fabers Test: neg  Slump Test: neg  SLR Test: neg back and hip  Palpation: R adductor, glute medius, iliacus, psoas and QL  Gait: decreased hip extension and trunk rotation.     CMS Impairment/Limitation/Restriction for FOTO back Survey    Therapist reviewed FOTO scores for Jarod Apple Cavignac Sr. on 5/25/2023.   FOTO documents entered into [employer] - see Media section.    Limitation Score: 45%  Category: Other               TREATMENT   Treatment Time In: 427 pm  Treatment Time Out: 450 pm  Total Treatment time (time-based codes) separate from  Evaluation: 23 minutes    Jarod received therapeutic exercises to develop strength, endurance, ROM, posture, and core stabilization for 10 minutes including:  PPT x 30  Small amplitude trunk rotation x 30    Bridges with add ball 3 x 10  Bridges with abd BTB 3 x 10  Supine clams BTB 3 x 10      Hip add/frog stretch 3 x 30 secs  Trunk rotation stretch 3 x 30 secs  SKTC opp shoulder 3 x 30 secs  Piriformis stretch 3 x 30 secs  Jamar test position hip flexor stretch 3 x 30 secs     Jarod received the following manual therapy techniques: Joint mobilizations, Manual traction, and Myofacial release STM were applied to the: iliacus, glut medius and psoas, adductor  for 13 minutes, including:  Long axis traction short axis traction      Jarod participated in dynamic functional therapeutic activities to improve functional performance for 0  minutes, includin    Jarod participated in gait training to improve functional mobility and safety for 0  minutes, includin      Jarod received hot pack for 0 minutes to 0.    Jarod received cold pack for 00 minutes to 0.    Home Exercises and Patient Education Provided    Education provided:   - HEP, pain management    Written Home Exercises Provided: Patient instructed to cont prior HEP.  Exercises were reviewed and Jarod was able to demonstrate them prior to the end of the session.  Jarod demonstrated good  understanding of the education provided.     See EMR under Patient Instructions for exercises provided 2023.    Assessment   Jarod is a 47 y.o. male referred to outpatient Physical Therapy with a medical diagnosis of M54.31 (ICD-10-CM) - Right sided sciatica. Pt presents with R hip flexor strain and resultant glute medius and piriformis guarding with lifting and carrying boxes up and down stairs assisting with moving and prolonged positioning with driving +5 hrs to and then from [employer] campus during move leading to exacerbation and presentation noted in the above  evaluation. Pt to benefit from strengthening and lumbar stabilization to address deficits.     Pt prognosis is Good.   Pt will benefit from skilled outpatient Physical Therapy to address the deficits stated above and in the chart below, provide pt/family education, and to maximize pt's level of independence.     Plan of care discussed with patient: Yes  Pt's spiritual, cultural and educational needs considered and patient is agreeable to the plan of care and goals as stated below:     Anticipated Barriers for therapy: none    Medical Necessity is demonstrated by the following  History  Co-morbidities and personal factors that may impact the plan of care Co-morbidities:   See PMHx    Personal Factors:   no deficits     low   Examination  Body Structures and Functions, activity limitations and participation restrictions that may impact the plan of care Body Regions:   back  lower extremities    Body Systems:    gross symmetry  ROM  strength  gait  motor control    Participation Restrictions:   none    Activity limitations:   Learning and applying knowledge  no deficits    General Tasks and Commands  no deficits    Communication  no deficits    Mobility  lifting and carrying objects  walking    Self care  dressing    Domestic Life  doing house work (cleaning house, washing dishes, laundry)    Interactions/Relationships  no deficits    Life Areas  employment    Community and Social Life  community life  recreation and leisure         low     Clinical Presentation stable and uncomplicated low   Decision Making/ Complexity Score: low     Goals:STG 3 weeks  1.  Pt to be independent with HEP for increased functional mobility and pain control.  2.  Pt to increase AROM at 100 degs trunk flexion for increased functional mobility and transfers.  3.  Pt to decrease pain to less than 2/10 after session for increased functional mobility.    Long Term Goals: 8 weeks   1.  Pt to be independent with pain management strategies and  verbalize 2 strategies for increased functional mobility and pain control.  2.  Pt to increase AROM at B hip extension 25 degs for increased functional mobility and transfers.  3.  Pt to decrease pain to less than 1/10 after full day of work for increased functional mobility.  4.  Pt to increase hip extension strength on R to 4+/5 for increased standing tolerance and mobility.  5.  Pt to increase tolerance to 30 minutes of continuous walking for return to recreational exercise.    Plan   Plan of care Certification: 5/25/2023 to 8/31/23.    Outpatient Physical Therapy 2 times weekly for 10 weeks to include the following interventions: Cervical/Lumbar Traction, Electrical Stimulation ,, Gait Training, Manual Therapy, Moist Heat/ Ice, Neuromuscular Re-ed, Patient Education, Therapeutic Activities, and Therapeutic Exercise.     Darcy Nava, PT

## 2023-06-01 NOTE — PROGRESS NOTES
[facility]   Physical Therapy Treatment Note      Name: Jarod Cruzac Sr.  Clinic Number: [id number]    Therapy Diagnosis: No diagnosis found.  Physician: Keon Metz MD    Visit Date: 6/2/2023    Physician Orders: PT eval and treat  Medical Diagnosis: M54.31 (ICD-10-CM) - Right sided sciatica  Evaluation Date: 5/25/23  Authorization Period Expiration: 12/31/23  Plan of Care Certification Period: 8/31/23  Progress Note Due: 6/25/23    Visit # / Visits authorized: 1/20  FOTO: 1/ 3   PTA Visit #: 1/5      Time In: 8:20  Time Out: 9:00  Total Appointment Time (timed & untimed codes): 40 minutes low complex eval. MT 1 TE 1     Precautions: Standard    Subjective     Pt reports: he was compliant with HEP with no issues. He remains with pain in the R glute that radiates down to his knee especially when he transfers from sitting to standing.  He was compliant with home exercise program.  Response to previous treatment: initial eval  Functional change: ongoing    Pain: 2/10  Location: right sided sciatica / low back     Objective      Objective Measures updated at progress report unless specified.     Treatment     Jarod received the treatments listed below:      therapeutic exercises to develop strength, endurance, ROM, posture, and core stabilization for 30 minutes including:    PPT 30x3''  Small amplitude trunk rotation x 30     Bridges with add ball 3 x 10  Bridges with abd BTB 3 x 10  Sidelying clams GTB 3 x 10  Sidelying hip abduction 2 x 10 B  Hooklying knee fallout GTB with 2 x 10     Hip add/frog stretch 3 x 30 secs  Trunk rotation stretch 3 x 30 secs  SKTC opp shoulder 3 x 30 secs  Piriformis stretch 3 x 30 secs B  Hamstring stretch 3 x 30 secs B  Jamar test position hip flexor stretch x1 min B    manual therapy techniques: Joint mobilizations, Manual traction, and Myofacial release were applied to the: iliacus, glute medius and psoas, adductor for 10 minutes,  including:  Long axis and short axis traction  Hypervolt massage gun to R glute, hamstring    therapeutic activities to improve functional performance for 0 minutes, including:    gait training to improve functional mobility and safety for 0 minutes, including:      Patient Education and Home Exercises       Education provided:   - HEP, pain management    Written Home Exercises Provided: Patient instructed to cont prior HEP. Exercises were reviewed and Jarod was able to demonstrate them prior to the end of the session.  Jarod demonstrated good  understanding of the education provided. See EMR under Patient Instructions for exercises provided during therapy sessions    Assessment     Jarod with good tolerance to treatment session and good carryover and insight with exercises. He presents with muscle guarding and requires verbal cues for relaxation during manual therapy. Encouraged continued compliance with HEP. No pain or issues to report during today's session but patient does report increased difficulty and muscle fatigue on the R compared to the L. Continue to monitor and progress as able.    Jarod Is progressing well towards his goals.   Pt prognosis is Good.     Pt will continue to benefit from skilled outpatient physical therapy to address the deficits listed in the problem list box on initial evaluation, provide pt/family education and to maximize pt's level of independence in the home and community environment.     Pt's spiritual, cultural and educational needs considered and pt agreeable to plan of care and goals.     Anticipated barriers to physical therapy: none    Goals:   STG 3 weeks  1.  Pt to be independent with HEP for increased functional mobility and pain control.  2.  Pt to increase AROM at 100 degs trunk flexion for increased functional mobility and transfers.  3.  Pt to decrease pain to less than 2/10 after session for increased functional mobility.     Long Term Goals: 8 weeks   1.  Pt to be  independent with pain management strategies and verbalize 2 strategies for increased functional mobility and pain control.  2.  Pt to increase AROM at B hip extension 25 degs for increased functional mobility and transfers.  3.  Pt to decrease pain to less than 1/10 after full day of work for increased functional mobility.  4.  Pt to increase hip extension strength on R to 4+/5 for increased standing tolerance and mobility.  5.  Pt to increase tolerance to 30 minutes of continuous walking for return to recreational exercise.    Plan     Continue PT POC.    Bibi Chi, PTA

## 2023-06-02 ENCOUNTER — CLINICAL SUPPORT (OUTPATIENT)
Dept: REHABILITATION | Facility: HOSPITAL | Age: 48
End: 2023-06-02
Payer: COMMERCIAL

## 2023-06-02 DIAGNOSIS — M62.81 WEAKNESS OF TRUNK MUSCULATURE: ICD-10-CM

## 2023-06-02 DIAGNOSIS — M25.651 DECREASED RANGE OF RIGHT HIP MOVEMENT: ICD-10-CM

## 2023-06-02 DIAGNOSIS — R29.898 WEAKNESS OF RIGHT LOWER EXTREMITY: Primary | ICD-10-CM

## 2023-06-02 DIAGNOSIS — M53.86 DECREASED RANGE OF MOTION OF INTERVERTEBRAL DISCS OF LUMBAR SPINE: ICD-10-CM

## 2023-06-02 PROCEDURE — 97110 THERAPEUTIC EXERCISES: CPT | Mod: PO,CQ

## 2023-06-02 PROCEDURE — 97140 MANUAL THERAPY 1/> REGIONS: CPT | Mod: PO,CQ

## 2023-06-04 DIAGNOSIS — R79.89 ELEVATED LFTS: Primary | ICD-10-CM

## 2023-06-09 ENCOUNTER — CLINICAL SUPPORT (OUTPATIENT)
Dept: REHABILITATION | Facility: HOSPITAL | Age: 48
End: 2023-06-09
Payer: COMMERCIAL

## 2023-06-09 DIAGNOSIS — R29.898 WEAKNESS OF RIGHT LOWER EXTREMITY: Primary | ICD-10-CM

## 2023-06-09 DIAGNOSIS — M25.651 DECREASED RANGE OF RIGHT HIP MOVEMENT: ICD-10-CM

## 2023-06-09 DIAGNOSIS — M53.86 DECREASED RANGE OF MOTION OF INTERVERTEBRAL DISCS OF LUMBAR SPINE: ICD-10-CM

## 2023-06-09 DIAGNOSIS — M62.81 WEAKNESS OF TRUNK MUSCULATURE: ICD-10-CM

## 2023-06-09 PROCEDURE — 97140 MANUAL THERAPY 1/> REGIONS: CPT | Mod: PO,CQ

## 2023-06-09 PROCEDURE — 97110 THERAPEUTIC EXERCISES: CPT | Mod: PO,CQ

## 2023-06-09 NOTE — PROGRESS NOTES
[facility]   Physical Therapy Treatment Note      Name: Jarod Cruzac Sr.  Clinic Number: [id number]    Therapy Diagnosis:   Encounter Diagnoses   Name Primary?    Weakness of right lower extremity Yes    Weakness of trunk musculature     Decreased range of motion of intervertebral discs of lumbar spine     Decreased range of right hip movement      Physician: Keon Metz MD    Visit Date: 6/9/2023    Physician Orders: PT eval and treat  Medical Diagnosis: M54.31 (ICD-10-CM) - Right sided sciatica  Evaluation Date: 5/25/23  Authorization Period Expiration: 12/31/23  Plan of Care Certification Period: 8/31/23  Progress Note Due: 6/25/23    Visit # / Visits authorized: 2/20  FOTO: 1/ 3   PTA Visit #: 2/5      Time In: 8:15  Time Out: 9:00  Total Appointment Time (timed & untimed codes): 45 minutes MT 1 TE 2     Precautions: Standard    Subjective     Pt reports: he has been experiencing symptoms down the R leg that travels down the back of the leg to his calf. Feels a little bit like numbness and a cramp, it's hard to describe.  He was compliant with home exercise program  Response to previous treatment: felt fine  Functional change: ongoing    Pain: 2/10  Location: right sided sciatica / low back     Objective      Objective Measures updated at progress report unless specified.     Treatment     Jarod received the treatments listed below:      therapeutic exercises to develop strength, endurance, ROM, posture, and core stabilization for 30 minutes including:    PPT 30x3''  Small amplitude trunk rotation x 30     Bridges with add ball 3 x 10  Bridges with abd BTB 3 x 10  Sidelying clams GTB 3 x 10  Sidelying hip abduction 2 x 10 B  Hooklying knee fallout GTB with 2 x 10     Hip add/frog stretch 3 x 30 secs  Trunk rotation stretch 3 x 30 secs  SKTC 10x5''  SKTC opp shoulder 3 x 30 secs  Hamstring stretch 3 x 30 secs B  Jamar test position hip flexor stretch x1 min B    manual  therapy techniques: Joint mobilizations, Manual traction, and Myofacial release were applied to the: iliacus, glute medius and psoas, adductor for 15 minutes, including:  Long axis and short axis traction  Hypervolt massage gun to R glute, hamstring    therapeutic activities to improve functional performance for 0 minutes, including:    gait training to improve functional mobility and safety for 0 minutes, including:      Patient Education and Home Exercises       Education provided:   - HEP, pain management    Written Home Exercises Provided: Patient instructed to cont prior HEP. Exercises were reviewed and Jarod was able to demonstrate them prior to the end of the session.  Jarod demonstrated good  understanding of the education provided. See EMR under Patient Instructions for exercises provided during therapy sessions    Assessment     Jarod presents to treatment with reports of continued symptoms down the R leg that travel all the way down to his calf. He tolerated treatment well with no issues or adverse effects. No change in symptoms during today's session and he remains with good tolerance to manual therapy. Encouraged continued compliance at home with HEP. Continue to monitor and progress as able.    Jarod Is progressing well towards his goals.   Pt prognosis is Good.     Pt will continue to benefit from skilled outpatient physical therapy to address the deficits listed in the problem list box on initial evaluation, provide pt/family education and to maximize pt's level of independence in the home and community environment.     Pt's spiritual, cultural and educational needs considered and pt agreeable to plan of care and goals.     Anticipated barriers to physical therapy: none    Goals:   STG 3 weeks  1.  Pt to be independent with HEP for increased functional mobility and pain control.  2.  Pt to increase AROM at 100 degs trunk flexion for increased functional mobility and transfers.  3.  Pt to decrease  pain to less than 2/10 after session for increased functional mobility.     Long Term Goals: 8 weeks   1.  Pt to be independent with pain management strategies and verbalize 2 strategies for increased functional mobility and pain control.  2.  Pt to increase AROM at B hip extension 25 degs for increased functional mobility and transfers.  3.  Pt to decrease pain to less than 1/10 after full day of work for increased functional mobility.  4.  Pt to increase hip extension strength on R to 4+/5 for increased standing tolerance and mobility.  5.  Pt to increase tolerance to 30 minutes of continuous walking for return to recreational exercise.    Plan     Continue PT POC.    Bibi Chi, PTA

## 2023-06-22 ENCOUNTER — CLINICAL SUPPORT (OUTPATIENT)
Dept: REHABILITATION | Facility: HOSPITAL | Age: 48
End: 2023-06-22
Payer: COMMERCIAL

## 2023-06-22 DIAGNOSIS — R29.898 WEAKNESS OF RIGHT LOWER EXTREMITY: Primary | ICD-10-CM

## 2023-06-22 DIAGNOSIS — M62.81 WEAKNESS OF TRUNK MUSCULATURE: ICD-10-CM

## 2023-06-22 DIAGNOSIS — M25.651 DECREASED RANGE OF RIGHT HIP MOVEMENT: ICD-10-CM

## 2023-06-22 DIAGNOSIS — M53.86 DECREASED RANGE OF MOTION OF INTERVERTEBRAL DISCS OF LUMBAR SPINE: ICD-10-CM

## 2023-06-22 PROCEDURE — 97110 THERAPEUTIC EXERCISES: CPT | Mod: PO

## 2023-06-22 PROCEDURE — 97140 MANUAL THERAPY 1/> REGIONS: CPT | Mod: PO

## 2023-06-22 NOTE — PROGRESS NOTES
[facility]   Physical Therapy Treatment Note      Name: Jarod Arias .  Clinic Number: [id number]    Therapy Diagnosis:   Encounter Diagnoses   Name Primary?    Weakness of right lower extremity Yes    Weakness of trunk musculature     Decreased range of motion of intervertebral discs of lumbar spine     Decreased range of right hip movement        Physician: Keon Metz MD    Visit Date: 6/22/2023    Physician Orders: PT eval and treat  Medical Diagnosis: M54.31 (ICD-10-CM) - Right sided sciatica  Evaluation Date: 5/25/23  Authorization Period Expiration: 12/31/23  Plan of Care Certification Period: 8/31/23  Progress Note Due: 6/25/23    Visit # / Visits authorized: 2/20  FOTO: 1/ 3   PTA Visit #: 2/5      Time In: 1101 am   Time Out: 1140 am  Total Appointment Time (timed & untimed codes): 39 minutes MT 1 TE 3     Precautions: Standard    Subjective     Pt reports: he has been experiencing symptoms down the R leg that travels down the back of the leg to his calf. Feels a little bit like numbness and a cramp, it's hard to describe.    He was compliant with home exercise program    Response to previous treatment: felt fine  Functional change: ongoing    Pain: 1/10  Location: right sided sciatica / low back     Objective      Objective Measures updated at progress report unless specified.     Treatment     Jarod received the treatments listed below:      therapeutic exercises to develop strength, endurance, ROM, posture, and core stabilization for 24 minutes including:    PPT 30x3''  Small amplitude trunk rotation x 30     Bridges with add ball 3 x 10  Bridges with abd BTB 3 x 10  Sidelying clams BTB 3 x 10  Sidelying hip abduction 2 x 10 B  Hooklying knee fallout BTB with 2 x 10  Side step with BTB 3 x 20' semisquat, or fully extended  Hip thruster 20# with BTB 2 x 10  Sit to stand 2 x 10  Bent over standing hip extension 2 x 10  Prone hip extension 2 x 10     Hip  add/frog stretch 3 x 30 secs  Trunk rotation stretch 3 x 30 secs  SKTC 10x5''  SKTC opp shoulder 3 x 30 secs  Hamstring stretch 3 x 30 secs B  Jamar test position hip flexor stretch x1 min B    manual therapy techniques: Joint mobilizations, Manual traction, and Myofacial release were applied to the: iliacus, glute medius and psoas, adductor for 15 minutes, including:  Long axis and short axis traction  Hypervolt massage gun to R glute, hamstring  Traction laterally with belt in hooklying into flexion and horizontal adduction    therapeutic activities to improve functional performance for 0 minutes, including:    gait training to improve functional mobility and safety for 0 minutes, including:      Patient Education and Home Exercises       Education provided:   - HEP, pain management    Written Home Exercises Provided: Patient instructed to cont prior HEP. Exercises were reviewed and Jarod was able to demonstrate them prior to the end of the session.  Jarod demonstrated good  understanding of the education provided. See EMR under Patient Instructions for exercises provided during therapy sessions    Assessment     Jarod presents to treatment with reports of continued symptoms down the R leg that travel all the way down to his calf. He tolerated treatment well with no issues or adverse effects. No change in symptoms during today's session and he remains with good tolerance to manual therapy. Encouraged continued compliance at home with HEP. Continue to monitor and progress as able.    Jarod Is progressing well towards his goals.   Pt prognosis is Good.     Pt will continue to benefit from skilled outpatient physical therapy to address the deficits listed in the problem list box on initial evaluation, provide pt/family education and to maximize pt's level of independence in the home and community environment.     Pt's spiritual, cultural and educational needs considered and pt agreeable to plan of care and goals.      Anticipated barriers to physical therapy: none    Goals:   STG 3 weeks  1.  Pt to be independent with HEP for increased functional mobility and pain control. Progressing   2.  Pt to increase AROM at 100 degs trunk flexion for increased functional mobility and transfers.  3.  Pt to decrease pain to less than 2/10 after session for increased functional mobility. Met 6/22/23     Long Term Goals: 8 weeks   1.  Pt to be independent with pain management strategies and verbalize 2 strategies for increased functional mobility and pain control.  2.  Pt to increase AROM at B hip extension 25 degs for increased functional mobility and transfers.  3.  Pt to decrease pain to less than 1/10 after full day of work for increased functional mobility.  4.  Pt to increase hip extension strength on R to 4+/5 for increased standing tolerance and mobility.  5.  Pt to increase tolerance to 30 minutes of continuous walking for return to recreational exercise.    Plan     Continue PT POC.    Darcy Nava, PT

## 2023-07-07 ENCOUNTER — CLINICAL SUPPORT (OUTPATIENT)
Dept: REHABILITATION | Facility: HOSPITAL | Age: 48
End: 2023-07-07
Payer: COMMERCIAL

## 2023-07-07 DIAGNOSIS — M53.86 DECREASED RANGE OF MOTION OF INTERVERTEBRAL DISCS OF LUMBAR SPINE: ICD-10-CM

## 2023-07-07 DIAGNOSIS — M62.81 WEAKNESS OF TRUNK MUSCULATURE: ICD-10-CM

## 2023-07-07 DIAGNOSIS — M25.651 DECREASED RANGE OF RIGHT HIP MOVEMENT: ICD-10-CM

## 2023-07-07 DIAGNOSIS — R29.898 WEAKNESS OF RIGHT LOWER EXTREMITY: Primary | ICD-10-CM

## 2023-07-07 PROCEDURE — 97140 MANUAL THERAPY 1/> REGIONS: CPT | Mod: PO,CQ

## 2023-07-07 PROCEDURE — 97110 THERAPEUTIC EXERCISES: CPT | Mod: PO,CQ

## 2023-07-07 NOTE — PROGRESS NOTES
[facility]   Physical Therapy Treatment Note      Name: Jarod Cruzac Sr.  Clinic Number: [id number]    Therapy Diagnosis:   Encounter Diagnoses   Name Primary?    Weakness of right lower extremity Yes    Weakness of trunk musculature     Decreased range of motion of intervertebral discs of lumbar spine     Decreased range of right hip movement        Physician: Keon Metz MD    Visit Date: 7/7/2023    Physician Orders: PT eval and treat  Medical Diagnosis: M54.31 (ICD-10-CM) - Right sided sciatica  Evaluation Date: 5/25/23  Authorization Period Expiration: 12/31/23  Plan of Care Certification Period: 8/31/23  Progress Note Due: 6/25/23    Visit # / Visits authorized: 4/20  FOTO: 1/ 3   PTA Visit #: 1/5      Time In: 10:30 am  Time Out: 11:15 am  Total Appointment Time (timed & untimed codes): 45 minutes MT 1 TE 3     Precautions: Standard    Subjective     Pt reports: feels like he is getting better. Less frequency of symptoms with no pain but he is aware of the discomfort in the R hip and sometimes it goes down to the calf.    He was compliant with home exercise program    Response to previous treatment: felt fine  Functional change: ongoing    Pain: 1/10  Location: right sided sciatica / low back     Objective      Objective Measures updated at progress report unless specified.     Treatment     Jarod received the treatments listed below:      therapeutic exercises to develop strength, endurance, ROM, posture, and core stabilization for 30 minutes including:    PPT 30x3''  Small amplitude trunk rotation x 30     Bridges with add ball 3 x 10  Bridges with abd BTB 3 x 10  Sidelying clams BTB 3 x 10  Sidelying hip abduction 2 x 10 B  Hooklying knee fallout BTB with 2 x 10  Side step with BTB 3 x 20' semisquat, or fully extended  Hip thruster 20# with BTB 2 x 10  Sit to stand with 15# KB and BTB around the knees 2 x 10  Bent over standing hip extension 2 x 10  Prone hip  extension 2 x 10     Hip add/frog stretch 3 x 30 secs  Trunk rotation stretch 3 x 30 secs  SKTC 10x5''  SKTC opp shoulder 3 x 30 secs  Hamstring stretch 3 x 30 secs B  Jamar test position hip flexor stretch x1 min B    manual therapy techniques: Joint mobilizations, Manual traction, and Myofacial release were applied to the: iliacus, glute medius and psoas, adductor for 15 minutes, including:  Long axis and short axis traction  Hypervolt massage gun to R glute, hamstring  Traction laterally with belt in hooklying into flexion and horizontal adduction    therapeutic activities to improve functional performance for 0 minutes, including:    gait training to improve functional mobility and safety for 0 minutes, including:      Patient Education and Home Exercises       Education provided:   - HEP, pain management    Written Home Exercises Provided: Patient instructed to cont prior HEP. Exercises were reviewed and Jarod was able to demonstrate them prior to the end of the session.  Jarod demonstrated good  understanding of the education provided. See EMR under Patient Instructions for exercises provided during therapy sessions    Assessment     Jarod presents to treatment with reports of decreased pain and symptoms down the R leg. He tolerated treatment session well with no issues or pain. He remains with tightness in the R hip and LE more so than the L but showing improvement in flexibility during stretches today. Will continue to monitor and progress as able.    Jarod Is progressing well towards his goals.   Pt prognosis is Good.     Pt will continue to benefit from skilled outpatient physical therapy to address the deficits listed in the problem list box on initial evaluation, provide pt/family education and to maximize pt's level of independence in the home and community environment.     Pt's spiritual, cultural and educational needs considered and pt agreeable to plan of care and goals.     Anticipated barriers to  physical therapy: none    Goals:   STG 3 weeks  1.  Pt to be independent with HEP for increased functional mobility and pain control. Progressing   2.  Pt to increase AROM at 100 degs trunk flexion for increased functional mobility and transfers.  3.  Pt to decrease pain to less than 2/10 after session for increased functional mobility. Met 6/22/23     Long Term Goals: 8 weeks   1.  Pt to be independent with pain management strategies and verbalize 2 strategies for increased functional mobility and pain control.  2.  Pt to increase AROM at B hip extension 25 degs for increased functional mobility and transfers.  3.  Pt to decrease pain to less than 1/10 after full day of work for increased functional mobility.  4.  Pt to increase hip extension strength on R to 4+/5 for increased standing tolerance and mobility.  5.  Pt to increase tolerance to 30 minutes of continuous walking for return to recreational exercise.    Plan     Continue PT POC.    Bibi Chi, PTA

## 2023-07-11 ENCOUNTER — CLINICAL SUPPORT (OUTPATIENT)
Dept: REHABILITATION | Facility: HOSPITAL | Age: 48
End: 2023-07-11
Payer: COMMERCIAL

## 2023-07-11 DIAGNOSIS — R29.898 WEAKNESS OF RIGHT LOWER EXTREMITY: Primary | ICD-10-CM

## 2023-07-11 DIAGNOSIS — M62.81 WEAKNESS OF TRUNK MUSCULATURE: ICD-10-CM

## 2023-07-11 DIAGNOSIS — M10.9 GOUT, ARTHRITIS: ICD-10-CM

## 2023-07-11 DIAGNOSIS — M25.651 DECREASED RANGE OF RIGHT HIP MOVEMENT: ICD-10-CM

## 2023-07-11 DIAGNOSIS — M53.86 DECREASED RANGE OF MOTION OF INTERVERTEBRAL DISCS OF LUMBAR SPINE: ICD-10-CM

## 2023-07-11 PROCEDURE — 97110 THERAPEUTIC EXERCISES: CPT | Mod: PO,CQ

## 2023-07-11 PROCEDURE — 97140 MANUAL THERAPY 1/> REGIONS: CPT | Mod: PO,CQ

## 2023-07-11 RX ORDER — ALLOPURINOL 300 MG/1
TABLET ORAL
Qty: 90 TABLET | Refills: 0 | Status: SHIPPED | OUTPATIENT
Start: 2023-07-11 | End: 2023-12-28 | Stop reason: SDUPTHER

## 2023-07-11 NOTE — PROGRESS NOTES
[facility]   Physical Therapy Treatment Note      Name: Jarod Cruzac Sr.  Clinic Number: [id number]    Therapy Diagnosis:   Encounter Diagnoses   Name Primary?    Weakness of right lower extremity Yes    Weakness of trunk musculature     Decreased range of motion of intervertebral discs of lumbar spine     Decreased range of right hip movement        Physician: Keon Metz MD    Visit Date: 7/11/2023    Physician Orders: PT eval and treat  Medical Diagnosis: M54.31 (ICD-10-CM) - Right sided sciatica  Evaluation Date: 5/25/23  Authorization Period Expiration: 12/31/23  Plan of Care Certification Period: 8/31/23  Progress Note Due: 6/25/23    Visit # / Visits authorized: 5/20  FOTO: 1/ 3   PTA Visit #: 2/5      Time In: 10:45 am  Time Out: 11:25 am  Total Appointment Time (timed & untimed codes): 40 minutes MT 1 TE 2     Precautions: Standard    Subjective     Pt reports: he is having some discomfort/soreness in the groin. He has been trying to stretch but not as compliant. He remains with significant tightness in the R calf.    He was compliant with home exercise program    Response to previous treatment: felt fine  Functional change: ongoing    Pain: 2/10  Location: right sided sciatica / low back     Objective      Objective Measures updated at progress report unless specified.     Treatment     Jarod received the treatments listed below:      therapeutic exercises to develop strength, endurance, ROM, posture, and core stabilization for 30 minutes including:    PPT 30x3''  Small amplitude trunk rotation x 30     Bridges with add ball 3 x 10  Bridges with abd BTB 3 x 10  Sidelying clams BTB 3 x 10  Sidelying hip abduction 2 x 10 B  Hooklying knee fallout BTB with 2 x 10  Side step with BTB 3 x 20' semisquat, or fully extended  Hip thruster 25# with BTB 2 x 10  Sit to stand with 20# KB and BTB around the knees 2 x 10  Bent over standing hip extension 2 x 10  Prone hip  extension 2 x 10    Hip add/frog stretch 3 x 30 secs  Trunk rotation stretch 3 x 30 secs  SKTC 10x5''  SKTC opp shoulder 3 x 30 secs  Hamstring stretch 3 x 30 secs B  Jamar test position hip flexor stretch x1 min B    manual therapy techniques: Joint mobilizations, Manual traction, and Myofacial release were applied to the: iliacus, glute medius and psoas, adductor for 10 minutes, including:  Long axis and short axis traction  Hypervolt massage gun to R glute, hamstring  Traction laterally with belt in hooklying into flexion and horizontal adduction    therapeutic activities to improve functional performance for 0 minutes, including:    gait training to improve functional mobility and safety for 0 minutes, including:      Patient Education and Home Exercises       Education provided:   - HEP, pain management    Written Home Exercises Provided: Patient instructed to cont prior HEP. Exercises were reviewed and Jarod was able to demonstrate them prior to the end of the session.  Jarod demonstrated good  understanding of the education provided. See EMR under Patient Instructions for exercises provided during therapy sessions    Assessment     Jarod remains with good tolerance to current treatment plan with good carryover and insight with exercises. He continues with reports of tightness in the R hamstring and calf but showing improvement in flexibility following stretches and manual therapy. Patient educated for compliance with stretches. Will continue to monitor and progress as able.    Jarod Is progressing well towards his goals.   Pt prognosis is Good.     Pt will continue to benefit from skilled outpatient physical therapy to address the deficits listed in the problem list box on initial evaluation, provide pt/family education and to maximize pt's level of independence in the home and community environment.     Pt's spiritual, cultural and educational needs considered and pt agreeable to plan of care and goals.      Anticipated barriers to physical therapy: none    Goals:   STG 3 weeks  1.  Pt to be independent with HEP for increased functional mobility and pain control. Progressing   2.  Pt to increase AROM at 100 degs trunk flexion for increased functional mobility and transfers.  3.  Pt to decrease pain to less than 2/10 after session for increased functional mobility. Met 6/22/23     Long Term Goals: 8 weeks   1.  Pt to be independent with pain management strategies and verbalize 2 strategies for increased functional mobility and pain control.  2.  Pt to increase AROM at B hip extension 25 degs for increased functional mobility and transfers.  3.  Pt to decrease pain to less than 1/10 after full day of work for increased functional mobility.  4.  Pt to increase hip extension strength on R to 4+/5 for increased standing tolerance and mobility.  5.  Pt to increase tolerance to 30 minutes of continuous walking for return to recreational exercise.    Plan     Continue PT POC.    Bibi Chi, PTA

## 2023-07-19 NOTE — PROGRESS NOTES
[facility]   Physical Therapy Treatment Note     Name: Jarod Cruzac Sr.  Clinic Number: [id number]    Therapy Diagnosis:   Encounter Diagnoses   Name Primary?    Weakness of right lower extremity Yes    Weakness of trunk musculature     Decreased range of motion of intervertebral discs of lumbar spine     Decreased range of right hip movement          Physician: Keon Metz MD    Visit Date: 7/20/2023    Physician Orders: PT eval and treat  Medical Diagnosis: M54.31 (ICD-10-CM) - Right sided sciatica  Evaluation Date: 5/25/23  Authorization Period Expiration: 12/31/23  Plan of Care Certification Period: 8/31/23  Progress Note Due: 6/25/23  Visit # / Visits authorized: 6/20  FOTO: 2/ 3 - last completed on 7/20/23  PTA Visit #: 0/5      Time In: 8:00  Time Out: 8:45  Total Appointment Time (timed & untimed codes): 45 minutes      Precautions: Standard    Subjective     Pt reports: still have a twinge of pain in the right glute and leg    He was compliant with home exercise program    Response to previous treatment: felt fine  Functional change: able to travel for work without increase in pain    Pain: 2/10  Location: right sided sciatica / low back     Objective      Objective measures taken at progress report unless specified otherwise.     Treatment     Jarod received the treatments listed below:      therapeutic exercises to develop strength, endurance, ROM, posture, and core stabilization for 23 minutes including:    Seated sciatic nerve glides x15 B     PPT 30x3''  Small amplitude trunk rotation x 30     Bridges with add ball 3 x 10  Bridges with abd BTB 3 x 10  Sidelying clams BTB 3 x 10  Sidelying hip abduction 2 x 10 B  Hooklying knee fallout BTB with 2 x 10  Bent over standing hip extension 2 x 10  Prone hip extension 2 x 10    Hip add/frog stretch 3 x 30 secs  Trunk rotation stretch 3 x 30 secs  SKTC 10x5''  SKTC opp shoulder 3 x 30 secs  Hamstring stretch 3 x 30  secs B  Jamar test position hip flexor stretch x1 min B    manual therapy techniques: Joint mobilizations, Manual traction, and Myofacial release were applied to the: iliacus, glute medius and psoas, adductor for 8 minutes, including:  Long axis and short axis traction  Hypervolt massage gun to R glute, hamstring  Traction laterally with belt in hooklying into flexion and horizontal adduction    therapeutic activities to improve functional performance for 14 minutes, including:  Side step with BTB 3 x 20' semisquat, or fully extended  Hip thruster 25# with BTB 2 x 10  Sit to stand with 25# KB and BTB around the knees 2 x 10  +sled push/pull 70# x2 laps  +hip hinge with yellow dowel x15     gait training to improve functional mobility and safety for 0 minutes, including:      Patient Education and Home Exercises       Education provided:   - HEP, pain management    Written Home Exercises Provided: Patient instructed to cont prior HEP. Exercises were reviewed and Jarod was able to demonstrate them prior to the end of the session.  Jarod demonstrated good  understanding of the education provided. See EMR under Patient Instructions for exercises provided during therapy sessions    Assessment     Jarod continues to tolerate sessions well with report twinge in the glute and leg. He was able to complete newly added activities without issues and with good form requiring only min verbal cues. Continues to progress as tolerated.     Jarod Is progressing well towards his goals.   Pt prognosis is Good.     Pt will continue to benefit from skilled outpatient physical therapy to address the deficits listed in the problem list box on initial evaluation, provide pt/family education and to maximize pt's level of independence in the home and community environment.     Pt's spiritual, cultural and educational needs considered and pt agreeable to plan of care and goals.     Anticipated barriers to physical therapy: none    Goals: (not  met, progressing unless otherwise specified)  STG 3 weeks   1.  Pt to be independent with HEP for increased functional mobility and pain control. Progressing   2.  Pt to increase AROM at 100 degs trunk flexion for increased functional mobility and transfers.  3.  Pt to decrease pain to less than 2/10 after session for increased functional mobility. Met 6/22/23     Long Term Goals: 8 weeks   1.  Pt to be independent with pain management strategies and verbalize 2 strategies for increased functional mobility and pain control.  2.  Pt to increase AROM at B hip extension 25 degs for increased functional mobility and transfers.  3.  Pt to decrease pain to less than 1/10 after full day of work for increased functional mobility.  4.  Pt to increase hip extension strength on R to 4+/5 for increased standing tolerance and mobility.  5.  Pt to increase tolerance to 30 minutes of continuous walking for return to recreational exercise.    Plan     Continue PT POC.    Teresa Javier, PT

## 2023-07-20 ENCOUNTER — CLINICAL SUPPORT (OUTPATIENT)
Dept: REHABILITATION | Facility: HOSPITAL | Age: 48
End: 2023-07-20
Payer: COMMERCIAL

## 2023-07-20 DIAGNOSIS — M25.651 DECREASED RANGE OF RIGHT HIP MOVEMENT: ICD-10-CM

## 2023-07-20 DIAGNOSIS — M62.81 WEAKNESS OF TRUNK MUSCULATURE: ICD-10-CM

## 2023-07-20 DIAGNOSIS — M53.86 DECREASED RANGE OF MOTION OF INTERVERTEBRAL DISCS OF LUMBAR SPINE: ICD-10-CM

## 2023-07-20 DIAGNOSIS — R29.898 WEAKNESS OF RIGHT LOWER EXTREMITY: Primary | ICD-10-CM

## 2023-07-20 PROCEDURE — 97110 THERAPEUTIC EXERCISES: CPT | Mod: PO

## 2023-07-20 PROCEDURE — 97530 THERAPEUTIC ACTIVITIES: CPT | Mod: PO

## 2023-07-20 PROCEDURE — 97140 MANUAL THERAPY 1/> REGIONS: CPT | Mod: PO

## 2023-07-24 ENCOUNTER — CLINICAL SUPPORT (OUTPATIENT)
Dept: REHABILITATION | Facility: HOSPITAL | Age: 48
End: 2023-07-24
Payer: COMMERCIAL

## 2023-07-24 DIAGNOSIS — M53.86 DECREASED RANGE OF MOTION OF INTERVERTEBRAL DISCS OF LUMBAR SPINE: ICD-10-CM

## 2023-07-24 DIAGNOSIS — M25.651 DECREASED RANGE OF RIGHT HIP MOVEMENT: ICD-10-CM

## 2023-07-24 DIAGNOSIS — M62.81 WEAKNESS OF TRUNK MUSCULATURE: ICD-10-CM

## 2023-07-24 DIAGNOSIS — R29.898 WEAKNESS OF RIGHT LOWER EXTREMITY: Primary | ICD-10-CM

## 2023-07-24 PROCEDURE — 97140 MANUAL THERAPY 1/> REGIONS: CPT | Mod: PO

## 2023-07-24 PROCEDURE — 97110 THERAPEUTIC EXERCISES: CPT | Mod: PO

## 2023-07-24 PROCEDURE — 97530 THERAPEUTIC ACTIVITIES: CPT | Mod: PO

## 2023-07-24 NOTE — PROGRESS NOTES
[facility]   Physical Therapy Treatment Note     Name: Jarod Cruzac Sr.  Clinic Number: [id number]    Therapy Diagnosis:   Encounter Diagnoses   Name Primary?    Weakness of right lower extremity Yes    Weakness of trunk musculature     Decreased range of motion of intervertebral discs of lumbar spine     Decreased range of right hip movement            Physician: Keon Metz MD    Visit Date: 7/24/2023    Physician Orders: PT eval and treat  Medical Diagnosis: M54.31 (ICD-10-CM) - Right sided sciatica  Evaluation Date: 5/25/23  Authorization Period Expiration: 12/31/23  Plan of Care Certification Period: 8/31/23  Progress Note Due: 8/24/23  Visit # / Visits authorized: 7/20  FOTO: 2/ 3 - last completed on 7/20/23    PTA Visit #: 0/5      Time In: 8:29 an   Time Out: 912 am  Total Appointment Time (timed & untimed codes): 43 minutes TE 2, MT 1 TA 1     Precautions: Standard    Subjective     Pt reports: no issues with back just R calf.    He was compliant with home exercise program    Response to previous treatment: felt fine  Functional change: able to travel for work without increase in pain    Pain: 0-1/10  Location: right sided sciatica / low back     Objective      7/24/23:    Lumbar ROM: (measured in degrees)     Degrees Quality   flexion    100 No Pain   extension    30     Rotation R +65     Rotation L +65        Active/Passive Hip ROM: (measured in degrees) WNL with no pain    RLE LLE   Flexion 120/ 120/   Extension 30/ 30/     Special Tests: ((+): pos.; (-): neg.)   Fabers Test: neg  Slump Test: neg  SLR Test: neg back and hip  Palpation: R adductor, glute medius, iliacus,psoas very mild;  R lateral gastrocnemius mild medium  Gait: normal gait skills    Treatment     Jarod received the treatments listed below:      therapeutic exercises to develop strength, endurance, ROM, posture, and core stabilization for 23 minutes including:  Progress noted measurements  noted in objective  Seated sciatic nerve glides x15 B     PPT 30x3''  Small amplitude trunk rotation x 30     Bridges with add ball 3 x 10  Bridges with abd BTB 3 x 10 staggered  Sidelying clams BTB 3 x 10 changing angles  Sidelying hip abduction 2 x 10 B  Hooklying knee fallout BTB with 2 x 10  Bent over standing hip extension 2 x 10  Prone hip extension 2 x 10      Hip add/frog stretch 3 x 30 secs  Trunk rotation stretch 3 x 30 secs  SKTC 10x5''  SKTC opp shoulder 3 x 30 secs  Hamstring stretch 3 x 30 secs B  Jamar test position hip flexor stretch x1 min B    manual therapy techniques: Joint mobilizations, Manual traction, and Myofacial release were applied to the: iliacus, glute medius and psoas, adductor for 8 minutes, including:  Long axis and short axis traction  Hypervolt massage gun to R glute, hamstring  Traction laterally with belt in hooklying into flexion and horizontal adduction  Self STM with knee on lateral gastrocnemius   Self lateral traction with pullup assist bands.    therapeutic activities to improve functional performance for 10 minutes, including:  Side step with BTB 3 x 20' semisquat, or fully extended  Hip thruster 25# with BTB 2 x 10  +walking lunges 2 x 40'  +reverse lunge into extension and laterally x 10 each  Sit to stand with 25# KB and BTB around the knees 2 x 10  +sled push/pull 70# x2 laps  +hip hinge with yellow dowel x15     gait training to improve functional mobility and safety for 0 minutes, including:      Patient Education and Home Exercises       Education provided:   - HEP, pain management    Written Home Exercises Provided: Patient instructed to cont prior HEP. Exercises were reviewed and Jarod was able to demonstrate them prior to the end of the session.  Jarod demonstrated good  understanding of the education provided. See EMR under Patient Instructions for exercises provided during therapy sessions    Assessment     Jarod continues to progress with exercise without  issues to include education with self STM and self traction with progression of exercises today without issues. Pt achieved 5 of 8 goals and reported self management with use of standing desk and with change of position hourly when prolonged sitting to achieve education goals.     Jarod Is progressing well towards his goals.   Pt prognosis is Good.     Pt will continue to benefit from skilled outpatient physical therapy to address the deficits listed in the problem list box on initial evaluation, provide pt/family education and to maximize pt's level of independence in the home and community environment.     Pt's spiritual, cultural and educational needs considered and pt agreeable to plan of care and goals.     Anticipated barriers to physical therapy: none    Goals: (not met, progressing unless otherwise specified)  STG 3 weeks   1.  Pt to be independent with HEP for increased functional mobility and pain control. MET 7/24/23  2.  Pt to increase AROM at 100 degs trunk flexion for increased functional mobility and transfers. MET 7/24/23  3.  Pt to decrease pain to less than 2/10 after session for increased functional mobility. Met 6/22/23     Long Term Goals: 8 weeks   1.  Pt to be independent with pain management strategies and verbalize 2 strategies for increased functional mobility and pain control. MET 7/24/23  2.  Pt to increase AROM at B hip extension 25 degs for increased functional mobility and transfers.MET 7/24/23  3.  Pt to decrease pain to less than 1/10 after full day of work for increased functional mobility.  4.  Pt to increase hip extension strength on R to 4+/5 for increased standing tolerance and mobility.  5.  Pt to increase tolerance to 30 minutes of continuous walking for return to recreational exercise.    Plan     Continue PT POC.    Darcy Nava, PT

## 2023-08-03 ENCOUNTER — CLINICAL SUPPORT (OUTPATIENT)
Dept: REHABILITATION | Facility: HOSPITAL | Age: 48
End: 2023-08-03
Payer: COMMERCIAL

## 2023-08-03 DIAGNOSIS — M62.81 WEAKNESS OF TRUNK MUSCULATURE: ICD-10-CM

## 2023-08-03 DIAGNOSIS — M25.651 DECREASED RANGE OF RIGHT HIP MOVEMENT: ICD-10-CM

## 2023-08-03 DIAGNOSIS — M53.86 DECREASED RANGE OF MOTION OF INTERVERTEBRAL DISCS OF LUMBAR SPINE: ICD-10-CM

## 2023-08-03 DIAGNOSIS — R29.898 WEAKNESS OF RIGHT LOWER EXTREMITY: Primary | ICD-10-CM

## 2023-08-03 PROCEDURE — 97140 MANUAL THERAPY 1/> REGIONS: CPT | Mod: PO

## 2023-08-03 PROCEDURE — 97112 NEUROMUSCULAR REEDUCATION: CPT | Mod: PO

## 2023-08-03 PROCEDURE — 97110 THERAPEUTIC EXERCISES: CPT | Mod: PO

## 2023-08-03 NOTE — PROGRESS NOTES
[facility]   Physical Therapy Treatment Note     Name: Jarod Cruzac Sr.  Clinic Number: [id number]    Therapy Diagnosis:   Encounter Diagnoses   Name Primary?    Weakness of right lower extremity Yes    Weakness of trunk musculature     Decreased range of motion of intervertebral discs of lumbar spine     Decreased range of right hip movement            Physician: Keon Metz MD    Visit Date: 8/3/2023    Physician Orders: PT eval and treat  Medical Diagnosis: M54.31 (ICD-10-CM) - Right sided sciatica  Evaluation Date: 5/25/23  Authorization Period Expiration: 12/31/23  Plan of Care Certification Period: 8/31/23  Progress Note Due: 8/24/23  Visit # / Visits authorized: 7/20  FOTO: 2/ 3 - last completed on 7/20/23    PTA Visit #: 0/5      Time In: 8:03 an   Time Out: 843 am  Total Appointment Time (timed & untimed codes): 40 minutes TE 2, MT 1 TA 1     Precautions: Standard    Subjective     Pt reports: no issues with back just R calf.    He was compliant with home exercise program    Response to previous treatment: felt fine  Functional change: able to travel for work without increase in pain    Pain: 1/10  Location: right sided sciatica / low back     Objective      7/24/23:    Lumbar ROM: (measured in degrees)     Degrees Quality   flexion    100 No Pain   extension    30     Rotation R +65     Rotation L +65        Active/Passive Hip ROM: (measured in degrees) WNL with no pain    RLE LLE   Flexion 120/ 120/   Extension 30/ 30/     Special Tests: ((+): pos.; (-): neg.)   Fabers Test: neg  Slump Test: neg  SLR Test: neg back and hip  Palpation: R adductor, glute medius, iliacus,psoas very mild;  R lateral gastrocnemius mild medium  Gait: normal gait skills    Treatment     Jarod received the treatments listed below:      therapeutic exercises to develop strength, endurance, ROM, posture, and core stabilization for 20 minutes including:  Progress noted measurements noted  "in objective  Seated sciatic nerve glides x15 B     PPT 30x3''  Small amplitude trunk rotation x 30     Bridges with add ball 3 x 10  Bridges with abd BTB 3 x 10 staggered  Sidelying clams BTB 3 x 10 changing angles  Sidelying hip abduction 2 x 10 B  Hooklying knee fallout BTB with 2 x 10  Bent over standing hip extension 2 x 10  Prone hip extension 2 x 10  Heel raises x 10 level surface x 10 on incline  Incline heel stretch 3 x 30"  Single heel wall stretch 3 x 30"      Hip add/frog stretch 3 x 30 secs  Trunk rotation stretch 3 x 30 secs  SKTC 10x5''  SKTC opp shoulder 3 x 30 secs  Hamstring stretch 3 x 30 secs B  Jamar test position hip flexor stretch x1 min B    manual therapy techniques: Joint mobilizations, Manual traction, and Myofacial release were applied to the: iliacus, glute medius and psoas, adductor for 10 minutes, including:  Long axis and short axis traction  Hypervolt massage gun to R glute, hamstring  Traction laterally with belt in hooklying into flexion and horizontal adduction  Self STM with knee on lateral gastrocnemius   Self lateral traction with pullup assist bands.  PA and AP fib tib grade III on B gastrocnemius      therapeutic activities to improve functional performance for 10 minutes, including:  Side step with BTB 3 x 20' semisquat, or fully extended  Hip thruster 25# with BTB 2 x 10  +walking lunges 2 x 40'  +reverse lunge into extension and laterally x 10 each  Sit to stand with 25# KB and BTB around the knees 2 x 10  +sled push/pull 70# x2 laps  +hip hinge with yellow dowel x15     gait training to improve functional mobility and safety for 0 minutes, including:      Patient Education and Home Exercises       Education provided:   - HEP, pain management    Written Home Exercises Provided: Patient instructed to cont prior HEP. Exercises were reviewed and Jarod was able to demonstrate them prior to the end of the session.  Jarod demonstrated good  understanding of the education " provided. See EMR under Patient Instructions for exercises provided during therapy sessions    Assessment     Jarod continues to progress with exercise without issues to include education with self STM and compliance with STM with massage gun and frequent stretch for continued relief with his calf and further carryover with neuro deficits as he has seen 50% improvement already with dorsum of foot.     Jarod Is progressing well towards his goals.   Pt prognosis is Good.     Pt will continue to benefit from skilled outpatient physical therapy to address the deficits listed in the problem list box on initial evaluation, provide pt/family education and to maximize pt's level of independence in the home and community environment.     Pt's spiritual, cultural and educational needs considered and pt agreeable to plan of care and goals.     Anticipated barriers to physical therapy: none    Goals: (not met, progressing unless otherwise specified)  STG 3 weeks   1.  Pt to be independent with HEP for increased functional mobility and pain control. MET 7/24/23  2.  Pt to increase AROM at 100 degs trunk flexion for increased functional mobility and transfers. MET 7/24/23  3.  Pt to decrease pain to less than 2/10 after session for increased functional mobility. Met 6/22/23     Long Term Goals: 8 weeks   1.  Pt to be independent with pain management strategies and verbalize 2 strategies for increased functional mobility and pain control. MET 7/24/23  2.  Pt to increase AROM at B hip extension 25 degs for increased functional mobility and transfers.MET 7/24/23  3.  Pt to decrease pain to less than 1/10 after full day of work for increased functional mobility.  4.  Pt to increase hip extension strength on R to 4+/5 for increased standing tolerance and mobility.  5.  Pt to increase tolerance to 30 minutes of continuous walking for return to recreational exercise.    Plan     Continue PT POC.    Darcy Nava, PT

## 2023-08-04 ENCOUNTER — LAB VISIT (OUTPATIENT)
Dept: LAB | Facility: HOSPITAL | Age: 48
End: 2023-08-04
Attending: INTERNAL MEDICINE
Payer: COMMERCIAL

## 2023-08-04 ENCOUNTER — OFFICE VISIT (OUTPATIENT)
Dept: PRIMARY CARE CLINIC | Facility: CLINIC | Age: 48
End: 2023-08-04
Payer: COMMERCIAL

## 2023-08-04 VITALS
HEIGHT: 71 IN | HEART RATE: 95 BPM | SYSTOLIC BLOOD PRESSURE: 110 MMHG | BODY MASS INDEX: 26.05 KG/M2 | DIASTOLIC BLOOD PRESSURE: 82 MMHG | WEIGHT: 186.06 LBS | OXYGEN SATURATION: 98 %

## 2023-08-04 DIAGNOSIS — R79.89 ELEVATED LFTS: ICD-10-CM

## 2023-08-04 DIAGNOSIS — J32.9 SINUSITIS, UNSPECIFIED CHRONICITY, UNSPECIFIED LOCATION: Primary | ICD-10-CM

## 2023-08-04 LAB
ALBUMIN SERPL BCP-MCNC: 4.4 G/DL (ref 3.5–5.2)
ALP SERPL-CCNC: 47 U/L (ref 55–135)
ALT SERPL W/O P-5'-P-CCNC: 37 U/L (ref 10–44)
ANION GAP SERPL CALC-SCNC: 11 MMOL/L (ref 8–16)
AST SERPL-CCNC: 33 U/L (ref 10–40)
BILIRUB SERPL-MCNC: 0.5 MG/DL (ref 0.1–1)
BUN SERPL-MCNC: 11 MG/DL (ref 6–20)
CALCIUM SERPL-MCNC: 9.4 MG/DL (ref 8.7–10.5)
CHLORIDE SERPL-SCNC: 106 MMOL/L (ref 95–110)
CO2 SERPL-SCNC: 24 MMOL/L (ref 23–29)
CREAT SERPL-MCNC: 1.1 MG/DL (ref 0.5–1.4)
EST. GFR  (NO RACE VARIABLE): >60 ML/MIN/1.73 M^2
GLUCOSE SERPL-MCNC: 98 MG/DL (ref 70–110)
HAV IGM SERPL QL IA: NORMAL
HBV CORE IGM SERPL QL IA: NORMAL
HBV SURFACE AG SERPL QL IA: NORMAL
HCV AB SERPL QL IA: NORMAL
POTASSIUM SERPL-SCNC: 4.2 MMOL/L (ref 3.5–5.1)
PROT SERPL-MCNC: 7.2 G/DL (ref 6–8.4)
SODIUM SERPL-SCNC: 141 MMOL/L (ref 136–145)

## 2023-08-04 PROCEDURE — 99999 PR PBB SHADOW E&M-EST. PATIENT-LVL III: ICD-10-PCS | Mod: PBBFAC,,, | Performed by: INTERNAL MEDICINE

## 2023-08-04 PROCEDURE — 1159F PR MEDICATION LIST DOCUMENTED IN MEDICAL RECORD: ICD-10-PCS | Mod: CPTII,S$GLB,, | Performed by: INTERNAL MEDICINE

## 2023-08-04 PROCEDURE — 3008F PR BODY MASS INDEX (BMI) DOCUMENTED: ICD-10-PCS | Mod: CPTII,S$GLB,, | Performed by: INTERNAL MEDICINE

## 2023-08-04 PROCEDURE — 99214 OFFICE O/P EST MOD 30 MIN: CPT | Mod: S$GLB,,, | Performed by: INTERNAL MEDICINE

## 2023-08-04 PROCEDURE — 3008F BODY MASS INDEX DOCD: CPT | Mod: CPTII,S$GLB,, | Performed by: INTERNAL MEDICINE

## 2023-08-04 PROCEDURE — 99214 PR OFFICE/OUTPT VISIT, EST, LEVL IV, 30-39 MIN: ICD-10-PCS | Mod: S$GLB,,, | Performed by: INTERNAL MEDICINE

## 2023-08-04 PROCEDURE — 80074 ACUTE HEPATITIS PANEL: CPT | Performed by: INTERNAL MEDICINE

## 2023-08-04 PROCEDURE — 3079F PR MOST RECENT DIASTOLIC BLOOD PRESSURE 80-89 MM HG: ICD-10-PCS | Mod: CPTII,S$GLB,, | Performed by: INTERNAL MEDICINE

## 2023-08-04 PROCEDURE — 3074F SYST BP LT 130 MM HG: CPT | Mod: CPTII,S$GLB,, | Performed by: INTERNAL MEDICINE

## 2023-08-04 PROCEDURE — 3079F DIAST BP 80-89 MM HG: CPT | Mod: CPTII,S$GLB,, | Performed by: INTERNAL MEDICINE

## 2023-08-04 PROCEDURE — 80053 COMPREHEN METABOLIC PANEL: CPT | Performed by: INTERNAL MEDICINE

## 2023-08-04 PROCEDURE — 36415 COLL VENOUS BLD VENIPUNCTURE: CPT | Performed by: INTERNAL MEDICINE

## 2023-08-04 PROCEDURE — 3074F PR MOST RECENT SYSTOLIC BLOOD PRESSURE < 130 MM HG: ICD-10-PCS | Mod: CPTII,S$GLB,, | Performed by: INTERNAL MEDICINE

## 2023-08-04 PROCEDURE — 99999 PR PBB SHADOW E&M-EST. PATIENT-LVL III: CPT | Mod: PBBFAC,,, | Performed by: INTERNAL MEDICINE

## 2023-08-04 PROCEDURE — 1159F MED LIST DOCD IN RCRD: CPT | Mod: CPTII,S$GLB,, | Performed by: INTERNAL MEDICINE

## 2023-08-04 RX ORDER — AMOXICILLIN AND CLAVULANATE POTASSIUM 875; 125 MG/1; MG/1
1 TABLET, FILM COATED ORAL EVERY 12 HOURS
Qty: 20 TABLET | Refills: 0 | Status: SHIPPED | OUTPATIENT
Start: 2023-08-04 | End: 2024-01-04

## 2023-08-04 NOTE — PROGRESS NOTES
[facility] Note    Chief Complaint      Chief Complaint   Patient presents with    Sinus Problem     Sinus pressure past few weeks,  headaches and ear pressure       History of Present Illness      Jarod Apple Juana Fontanez is a 47 y.o. male pt of Dr. Schofield who presents today for: complaints of sinus pressure, headaches and ear pain.  Symptoms described as sinus pressure, right face/jaw pain episodes, headache associated with blurry vision.  Symptoms present for the past few weeks      Past Medical History:  Past Medical History:   Diagnosis Date    Allergy     Elevated blood pressure reading without diagnosis of hypertension     Gout     Left wrist fracture        Past Surgical History:   has a past surgical history that includes [place] tooth extraction and Pilonidal cyst drainage.    Family History:  family history includes Allergic rhinitis in his father; COPD in his mother; Cancer in his maternal grandfather, maternal grandmother, and paternal grandfather.     Social History:  Social History     Tobacco Use    Smoking status: Never    Smokeless tobacco: Former   Substance Use Topics    Alcohol use: Yes     Alcohol/week: 2.0 standard drinks of alcohol     Types: 2 Drinks containing 0.5 oz of alcohol per week    Drug use: No       I personally reviewed all past medical, surgical, social and family history.    Review of Systems   Constitutional:  Negative for chills, fever and malaise/fatigue.   HENT:  Positive for congestion, ear pain and sinus pain.    Respiratory:  Negative for shortness of breath.    Cardiovascular:  Negative for chest pain.   Gastrointestinal:  Negative for constipation, diarrhea, nausea and vomiting.   Skin:  Negative for rash.   Neurological:  Negative for weakness.   All other systems reviewed and are negative.       Medications:  Outpatient Encounter Medications as of 8/4/2023   Medication Sig Dispense Refill    allopurinoL (ZYLOPRIM) 300 MG tablet TAKE 1 TABLET(300 MG) BY  MOUTH EVERY DAY 90 tablet 0    ALPRAZolam (XANAX) 0.5 MG tablet Take 1 tablet (0.5 mg total) by mouth daily as needed for Anxiety. 10 tablet 0    amoxicillin-clavulanate 875-125mg (AUGMENTIN) 875-125 mg per tablet Take 1 tablet by mouth every 12 (twelve) hours. 20 tablet 0    azelastine (ASTELIN) 137 mcg (0.1 %) nasal spray 2 sprays (274 mcg total) by Nasal route 2 (two) times daily. 30 mL 12    cetirizine (ZYRTEC) 10 MG tablet Take 10 mg by mouth once daily.      ciprofloxacin HCl (CIPRO) 500 MG tablet Take 1 tablet (500 mg total) by mouth every 12 (twelve) hours. (Patient not taking: Reported on 8/4/2023) 14 tablet 0    diphenhydrAMINE (BENADRYL) 25 mg capsule Take 25 mg by mouth every 6 (six) hours as needed for Itching.      fluticasone propionate (FLONASE) 50 mcg/actuation nasal spray 1 spray by Each Nare route once daily.      hydrocortisone 2.5 % cream Apply topically 2 (two) times daily. 20 g 0    ondansetron (ZOFRAN-ODT) 4 MG TbDL Take 1 tablet (4 mg total) by mouth every 8 (eight) hours as needed (nausea). 16 tablet 0    pseudoephedrine (SUDAFED) 30 MG tablet Take 30 mg by mouth every 4 (four) hours as needed for Congestion.      triamcinolone acetonide 0.5% (KENALOG) 0.5 % Crea Apply topically 2 (two) times daily. 15 g 0     No facility-administered encounter medications on file as of 8/4/2023.       Allergies:  Review of patient's allergies indicates:   Allergen Reactions    No known drug allergies        Health Maintenance:  Immunization History   Administered Date(s) Administered    COVID-19, MRNA, LN-S, PF (Pfizer) (Purple Cap) 04/20/2021, 05/11/2021    Influenza - Quadrivalent - PF *Preferred* (6 months and older) 01/25/2019    Influenza - Trivalent (ADULT) 10/26/2011    Influenza Split 10/26/2011      Health Maintenance   Topic Date Due    Hepatitis C Screening  Never done    TETANUS VACCINE  Never done    Lipid Panel  09/21/2026        Physical Exam      Vital Signs  Pulse: 95  SpO2: 98 %  BP:  "110/82  BP Location: Right arm  Patient Position: Sitting  Pain Score:   5  Pain Loc: Head  Height and Weight  Height: 5' 11" (180.3 cm)  Weight: 84.4 kg (186 lb 1.1 oz)  BSA (Calculated - sq m): 2.06 sq meters  BMI (Calculated): 26  Weight in (lb) to have BMI = 25: 178.9]    Physical Exam  Vitals reviewed.   Constitutional:       Appearance: He is well-developed.   HENT:      Head: Normocephalic and atraumatic.      Right Ear: External ear normal.      Left Ear: External ear normal.   Cardiovascular:      Rate and Rhythm: Normal rate and regular rhythm.      Heart sounds: Normal heart sounds. No murmur heard.  Pulmonary:      Effort: Pulmonary effort is normal.      Breath sounds: Normal breath sounds. No wheezing or rales.   Abdominal:      General: Bowel sounds are normal.      Palpations: Abdomen is soft.          Laboratory:  CBC:  Recent Labs   Lab 09/21/21  1300 07/22/22  1056 05/19/23  0938   WBC 7.23 4.68 7.43   RBC 5.44 5.21 5.36   Hemoglobin 15.9 16.1 15.4   Hematocrit 48.5 46.7 48.5   Platelets 298 276 242   MCV 89 90 91   MCH 29.2 30.9 28.7   MCHC 32.8 34.5 31.8 L     CMP:  Recent Labs   Lab 09/21/21  1300 07/22/22  1056 05/19/23  0938   Glucose 91 100 102   Calcium 9.8 9.6 9.0   Albumin 4.7 4.5 4.2   Total Protein 7.4 6.8 7.5   Sodium 142 143 137   Potassium 4.3 5.0 4.0   CO2 25 28 26   Chloride 106 108 102   BUN 11 11 9   Alkaline Phosphatase 50 L 41 L 174 H   ALT 20 12 759 H   AST 21 15 403 H   Total Bilirubin 0.8 0.9 2.7 H     URINALYSIS:  Recent Labs   Lab 05/19/23  0931   Color, UA Bailee   Specific Gravity, UA 1.025   pH, UA 5.0   Protein, UA Negative   Nitrite, UA Negative   Leukocytes, UA Negative      LIPIDS:  Recent Labs   Lab 08/18/20  0843 09/21/21  1300   HDL 56 66   Cholesterol 213 H 213 H   Triglycerides 98 100   LDL Cholesterol 137.4 127.0   HDL/Cholesterol Ratio 26.3 31.0   Non-HDL Cholesterol 157 147   Total Cholesterol/HDL Ratio 3.8 3.2     TSH:      A1C:        Assessment/Plan "     Jarod Smithignac Sr. is a 47 y.o.male with:    1. Sinusitis, unspecified chronicity, unspecified location  - amoxicillin-clavulanate 875-125mg (AUGMENTIN) 875-125 mg per tablet; Take 1 tablet by mouth every 12 (twelve) hours.  Dispense: 20 tablet; Refill: 0  Sent in augmentin  2. Elevated LFTs  Repeat labs to confirm resolution.    Chronic conditions status updated as per HPI.  Other than changes above, cont current medications and maintain follow up with specialists.  No follow-ups on file.    Future Appointments   Date Time Provider Department Center   8/9/2023  7:15 AM Teresa Javier, PT [facility]   8/15/2023  7:45 AM Bibi Chi, PTA [facility]   9/11/2023  7:00 AM Alex Schofield MD [facility]       Keon Metz MD  [facility]

## 2023-08-15 ENCOUNTER — CLINICAL SUPPORT (OUTPATIENT)
Dept: REHABILITATION | Facility: HOSPITAL | Age: 48
End: 2023-08-15
Payer: COMMERCIAL

## 2023-08-15 DIAGNOSIS — M53.86 DECREASED RANGE OF MOTION OF INTERVERTEBRAL DISCS OF LUMBAR SPINE: ICD-10-CM

## 2023-08-15 DIAGNOSIS — R29.898 WEAKNESS OF RIGHT LOWER EXTREMITY: Primary | ICD-10-CM

## 2023-08-15 DIAGNOSIS — M25.651 DECREASED RANGE OF RIGHT HIP MOVEMENT: ICD-10-CM

## 2023-08-15 DIAGNOSIS — M62.81 WEAKNESS OF TRUNK MUSCULATURE: ICD-10-CM

## 2023-08-15 PROCEDURE — 97140 MANUAL THERAPY 1/> REGIONS: CPT | Mod: PO,CQ

## 2023-08-15 PROCEDURE — 97530 THERAPEUTIC ACTIVITIES: CPT | Mod: PO,CQ

## 2023-08-15 PROCEDURE — 97110 THERAPEUTIC EXERCISES: CPT | Mod: PO,CQ

## 2023-08-15 NOTE — PROGRESS NOTES
[facility]   Physical Therapy Treatment Note     Name: Jarod Cruzac Sr.  Clinic Number: [id number]    Therapy Diagnosis:   Encounter Diagnoses   Name Primary?    Weakness of right lower extremity Yes    Weakness of trunk musculature     Decreased range of motion of intervertebral discs of lumbar spine     Decreased range of right hip movement        Physician: Keon Metz MD    Visit Date: 8/15/2023    Physician Orders: PT eval and treat  Medical Diagnosis: M54.31 (ICD-10-CM) - Right sided sciatica  Evaluation Date: 5/25/23  Authorization Period Expiration: 12/31/23  Plan of Care Certification Period: 8/31/23  Progress Note Due: 8/24/23  Visit # / Visits authorized: 9/20  FOTO: 2/ 3 - last completed on 7/20/23    PTA Visit #: 1/5      Time In: 7:45 am   Time Out: 8:25 am  Total Appointment Time (timed & untimed codes): 40 minutes TE 2, MT 1 TA 1     Precautions: Standard    Subjective     Pt reports: he is not 100% yet although he does feel better. There is still a ''nagging pain'' lingering around.    He was compliant with home exercise program    Response to previous treatment: felt fine  Functional change: able to travel for work without increase in pain    Pain: 1-2/10  Location: right sided sciatica / low back     Objective          Treatment     Jarod received the treatments listed below:      therapeutic exercises to develop strength, endurance, ROM, posture, and core stabilization for 23 minutes including:  Progress noted measurements noted in objective  Seated sciatic nerve glides x15 B     PPT 30x3''  Small amplitude trunk rotation x 30     Bridges with add ball 3 x 10  Bridges with abd BTB 3 x 10 staggered  Sidelying clams BTB 3 x 10 changing angles  Sidelying hip abduction 2 x 10 B  Hooklying knee fallout BTB with 2 x 10  Bent over standing hip extension 2 x 10  Prone hip extension 2 x 10  Heel raises x 10 level surface x 10 on incline  Incline heel stretch 3 x  "30"  Single heel wall stretch 3 x 30"    Hip add/frog stretch 3 x 30 secs  Trunk rotation stretch 3 x 30 secs  SKTC 10x5''  SKTC opp shoulder 3 x 30 secs  Hamstring stretch 3 x 30 secs B  Jamar test position hip flexor stretch x1 min B    manual therapy techniques: Joint mobilizations, Manual traction, and Myofacial release were applied to the: iliacus, glute medius and psoas, adductor for 9 minutes, including:  Long axis and short axis traction  Hypervolt massage gun to R glute, hamstring  Traction laterally with belt in hooklying into flexion and horizontal adduction  Self STM with knee on lateral gastrocnemius   Self lateral traction with pullup assist bands  PA and AP fib tib grade III on B gastrocnemius    therapeutic activities to improve functional performance for 8 minutes, including:  Side step with BTB 3 x 20' semisquat, or fully extended +with 10# KB on leading arm above head  Hip thruster 25# with BTB 2 x 10  walking lunges 2 x 40'  reverse lunge into extension and laterally x 10 each  Sit to stand with 25# KB and BTB around the knees 2 x 10 +overhead press  sled push/pull 70# x2 laps  hip hinge with yellow dowel x15     gait training to improve functional mobility and safety for 0 minutes, including:      Patient Education and Home Exercises       Education provided:   - HEP, pain management    Written Home Exercises Provided: Patient instructed to cont prior HEP. Exercises were reviewed and Jarod was able to demonstrate them prior to the end of the session.  Jarod demonstrated good  understanding of the education provided. See EMR under Patient Instructions for exercises provided during therapy sessions    Assessment     Jarod with no issues with exercise progressions made today. He reports relief following manual therapy. Patient was provided with HEP and encouraged for compliance while he is out of town in order to manage symptoms. Will continue to monitor and progress as able.    Jarod Is " progressing well towards his goals.   Pt prognosis is Good.     Pt will continue to benefit from skilled outpatient physical therapy to address the deficits listed in the problem list box on initial evaluation, provide pt/family education and to maximize pt's level of independence in the home and community environment.     Pt's spiritual, cultural and educational needs considered and pt agreeable to plan of care and goals.     Anticipated barriers to physical therapy: none    Goals: (not met, progressing unless otherwise specified)  STG 3 weeks   1.  Pt to be independent with HEP for increased functional mobility and pain control. MET 7/24/23  2.  Pt to increase AROM at 100 degs trunk flexion for increased functional mobility and transfers. MET 7/24/23  3.  Pt to decrease pain to less than 2/10 after session for increased functional mobility. Met 6/22/23     Long Term Goals: 8 weeks   1.  Pt to be independent with pain management strategies and verbalize 2 strategies for increased functional mobility and pain control. MET 7/24/23  2.  Pt to increase AROM at B hip extension 25 degs for increased functional mobility and transfers.MET 7/24/23  3.  Pt to decrease pain to less than 1/10 after full day of work for increased functional mobility.  4.  Pt to increase hip extension strength on R to 4+/5 for increased standing tolerance and mobility.  5.  Pt to increase tolerance to 30 minutes of continuous walking for return to recreational exercise.    Plan     Continue PT POC.    Bibi Chi, PTA

## 2023-08-22 NOTE — PROGRESS NOTES
Liver function normalized from last check.  Previous elevation likely due to viral infection which has resolved.  Of note acute viral hepatitis panel (hepatitis-A, hepatitis-B, hepatitis-C) negative

## 2023-08-23 ENCOUNTER — CLINICAL SUPPORT (OUTPATIENT)
Dept: REHABILITATION | Facility: HOSPITAL | Age: 48
End: 2023-08-23
Payer: COMMERCIAL

## 2023-08-23 DIAGNOSIS — M25.651 DECREASED RANGE OF RIGHT HIP MOVEMENT: ICD-10-CM

## 2023-08-23 DIAGNOSIS — M53.86 DECREASED RANGE OF MOTION OF INTERVERTEBRAL DISCS OF LUMBAR SPINE: ICD-10-CM

## 2023-08-23 DIAGNOSIS — R29.898 WEAKNESS OF RIGHT LOWER EXTREMITY: Primary | ICD-10-CM

## 2023-08-23 DIAGNOSIS — M62.81 WEAKNESS OF TRUNK MUSCULATURE: ICD-10-CM

## 2023-08-23 PROCEDURE — 97140 MANUAL THERAPY 1/> REGIONS: CPT | Mod: PO

## 2023-08-23 PROCEDURE — 97530 THERAPEUTIC ACTIVITIES: CPT | Mod: PO

## 2023-08-23 PROCEDURE — 97110 THERAPEUTIC EXERCISES: CPT | Mod: PO

## 2023-08-23 NOTE — PROGRESS NOTES
[facility]   Physical Therapy Treatment Note     Name: Jarod Cruzac Sr.  Clinic Number: [id number]    Therapy Diagnosis:   Encounter Diagnoses   Name Primary?    Weakness of right lower extremity Yes    Weakness of trunk musculature     Decreased range of motion of intervertebral discs of lumbar spine     Decreased range of right hip movement          Physician: Keon Metz MD    Visit Date: 8/23/2023    Physician Orders: PT eval and treat  Medical Diagnosis: M54.31 (ICD-10-CM) - Right sided sciatica  Evaluation Date: 5/25/23  Authorization Period Expiration: 12/31/23  Plan of Care Certification Period: 8/31/23  Progress Note Due: 9/23/23  Visit # / Visits authorized: 10/20  FOTO: 2/ 3 - last completed on 7/20/23    PTA Visit #: 1/5      Time In: 850 am   Time Out: 930 am  Total Appointment Time (timed & untimed codes): 40 minutes TE 1 MT 1 TA 2     Precautions: Standard    Subjective     Pt reports: lasted on 5 hr drive there and 5 hrs back without issues with proper stretching in between but jammed my L side sliding into base during game.     He was compliant with home exercise program    Response to previous treatment: felt fine  Functional change: able to travel for work without increase in pain    Pain: 1-2/10  Location: right sided sciatica / low back     Objective    8/23/23  Lumbar ROM: (measured in degrees)     Degrees Quality   flexion    100 No pain at lumbar end range   extension    30     Rotation R +65     Rotation L +65        Active/Passive Hip ROM: (measured in degrees)     RLE LLE   Flexion 120/ 120/   Extension 30/ 30/        Treatment     Jarod received the treatments listed below:      therapeutic exercises to develop strength, endurance, ROM, posture, and core stabilization for 10 minutes including:  Progress noted measurements noted in objective  Seated sciatic nerve glides x15 B     PPT 30x3''  Small amplitude trunk rotation x 30     Bridges with  "add ball 3 x 10  Bridges with abd BTB 3 x 10 staggered  Sidelying clams BTB 3 x 10 changing angles  Sidelying hip abduction 2 x 10 B  Hooklying knee fallout BTB with 2 x 10  Bent over standing hip extension 2 x 10  Prone hip extension 2 x 10  Heel raises x 10 level surface x 10 on incline  Incline heel stretch 3 x 30"  Single heel wall stretch 3 x 30"    Hip add/frog stretch 3 x 30 secs  Trunk rotation stretch 3 x 30 secs  SKTC 10x5''  SKTC opp shoulder 3 x 30 secs  Hamstring stretch 3 x 30 secs B  Jamar test position hip flexor stretch x1 min B  Kneeling hip flexors stretches into neutral, rotation and side flexion 2 x20 secs    manual therapy techniques: Joint mobilizations, Manual traction, and Myofacial release were applied to the: iliacus, glute medius and psoas, adductor for 10 minutes, including:  Long axis and short axis traction  Hypervolt massage gun to R glute, hamstring  Traction laterally with belt in hooklying into flexion and horizontal adduction  Self STM with knee on lateral gastrocnemius   Self lateral traction, then long axis with pullup assist bands pt education with self traction  PA and AP fib tib grade III on B gastrocnemius    therapeutic activities to improve functional performance for 20 minutes, including:  Side step with BTB 3 x 20' semisquat, or fully extended +with 10# KB on leading arm above head  Hip thruster 25# with BTB 2 x 10  walking lunges 2 x 40'  reverse lunge into  curtsy, extension and laterally  3 x 5 each  Sit to stand with 25# KB and BTB around the knees 2 x 10 +overhead press  Sit to stand with 10# KB overhead unilaterally and BTB around the knees 2 x 10   sled push/pull 70# x2 laps  hip hinge with yellow dowel x15   Deadlift x 20 with 25#    gait training to improve functional mobility and safety for 0 minutes, including:      Patient Education and Home Exercises       Education provided:   - HEP, pain management    Written Home Exercises Provided: Patient instructed to " cont prior HEP. Exercises were reviewed and Jarod was able to demonstrate them prior to the end of the session.  Jarod demonstrated good  understanding of the education provided. See EMR under Patient Instructions for exercises provided during therapy sessions    Assessment     Jarod with no issues with exercise progression and education with self traction. Pt with good insight and relief with self traction. Pt with reported 5 hrs tolerance with prolonged sitting without issues and independent management after drive in both directions. Pt achieved 5 of 8 set goals and WNL with trunk AROM and hip AROM overall with mild pain but able to resolve in session with streteches.     Jarod Is progressing well towards his goals.   Pt prognosis is Good.     Pt will continue to benefit from skilled outpatient physical therapy to address the deficits listed in the problem list box on initial evaluation, provide pt/family education and to maximize pt's level of independence in the home and community environment.     Pt's spiritual, cultural and educational needs considered and pt agreeable to plan of care and goals.     Anticipated barriers to physical therapy: none    Goals: (not met, progressing unless otherwise specified)  STG 3 weeks   1.  Pt to be independent with HEP for increased functional mobility and pain control. MET 7/24/23  2.  Pt to increase AROM at 100 degs trunk flexion for increased functional mobility and transfers. MET 7/24/23  3.  Pt to decrease pain to less than 2/10 after session for increased functional mobility. Met 6/22/23     Long Term Goals: 8 weeks   1.  Pt to be independent with pain management strategies and verbalize 2 strategies for increased functional mobility and pain control. MET 7/24/23  2.  Pt to increase AROM at B hip extension 25 degs for increased functional mobility and transfers.MET 7/24/23  3.  Pt to decrease pain to less than 1/10 after full day of work for increased functional  mobility.  4.  Pt to increase hip extension strength on R to 4+/5 for increased standing tolerance and mobility.  5.  Pt to increase tolerance to 30 minutes of continuous walking for return to recreational exercise.    Plan     Continue PT POC.    Dacry Nava, PT

## 2023-08-28 ENCOUNTER — PATIENT OUTREACH (OUTPATIENT)
Dept: ADMINISTRATIVE | Facility: HOSPITAL | Age: 48
End: 2023-08-28
Payer: COMMERCIAL

## 2023-08-28 NOTE — PROGRESS NOTES
Health Maintenance Due   Topic Date Due    HIV Screening  Never done    TETANUS VACCINE  Never done    Hemoglobin A1c (Diabetic Prevention Screening)  Never done    COVID-19 Vaccine (3 - Pfizer series) 07/06/2021       HM updated. Triggered LINKS and Care Everywhere. Chart reviewed.     Carmela Frye LPN   Clinical Care Coordinator  Primary Care and Wellness

## 2023-08-29 ENCOUNTER — CLINICAL SUPPORT (OUTPATIENT)
Dept: REHABILITATION | Facility: HOSPITAL | Age: 48
End: 2023-08-29
Payer: COMMERCIAL

## 2023-08-29 DIAGNOSIS — M25.651 DECREASED RANGE OF RIGHT HIP MOVEMENT: ICD-10-CM

## 2023-08-29 DIAGNOSIS — M62.81 WEAKNESS OF TRUNK MUSCULATURE: ICD-10-CM

## 2023-08-29 DIAGNOSIS — M53.86 DECREASED RANGE OF MOTION OF INTERVERTEBRAL DISCS OF LUMBAR SPINE: ICD-10-CM

## 2023-08-29 DIAGNOSIS — R29.898 WEAKNESS OF RIGHT LOWER EXTREMITY: Primary | ICD-10-CM

## 2023-08-29 PROCEDURE — 97140 MANUAL THERAPY 1/> REGIONS: CPT | Mod: PO

## 2023-08-29 PROCEDURE — 97110 THERAPEUTIC EXERCISES: CPT | Mod: PO

## 2023-08-29 PROCEDURE — 97530 THERAPEUTIC ACTIVITIES: CPT | Mod: PO

## 2023-08-29 NOTE — PROGRESS NOTES
" [facility] OUTPATIENT THERAPY AND WELLNESS   Physical Therapy Treatment Note / Discharge Visit     Name: Jarod Arias .  Clinic Number: [id number]    Therapy Diagnosis:   No diagnosis found.        Physician: Keon Metz MD    Visit Date: 8/29/2023    Physician Orders: PT eval and treat  Medical Diagnosis: M54.31 (ICD-10-CM) - Right sided sciatica  Evaluation Date: 5/25/23  Authorization Period Expiration: 12/31/23  Plan of Care Certification Period: 8/31/23  Progress Note Due: 9/23/23  Visit # / Visits authorized: 11/20  FOTO: 2/ 3 - last completed on 7/20/23  PTA Visit #: 0/5      Time In: 8:30  Time Out: 9:12  Total Appointment Time (timed & untimed codes): 42 minutes     Precautions: Standard    Subjective     Pt reports: lasted on 5 hr drive there and 5 hrs back without issues with proper stretching in between but jammed my L side sliding into base during game.     He was compliant with home exercise program    Response to previous treatment: felt fine  Functional change: able to travel for work without increase in pain    Pain: 1-2/10  Location: right sided sciatica / low back     Objective      Objective measures taken at progress report unless specified otherwise.        Treatment     Jarod received the treatments listed below:      therapeutic exercises to develop strength, endurance, ROM, posture, and core stabilization for 11 minutes including:  Progress noted measurements noted in objective  Seated sciatic nerve glides x15 B     PPT 30x3''  Small amplitude trunk rotation x 30     Bridges with add ball 3 x 10  Bridges with abd BTB 3 x 10  Sidelying clams BTB 3 x 10 changing angles  Sidelying hip abduction 2 x 10 B  Hooklying knee fallout BTB with 2 x 10  Bent over standing hip extension 2 x 10  Prone hip extension 2 x 10  Heel raises x 10 level surface x 10 on incline  Incline heel stretch 3 x 30"  Single heel wall stretch 3 x 30"    Hip add/frog stretch 3 x 30 secs  Trunk rotation stretch 3 " x 30 secs  SKTC 10x5''  SKTC opp shoulder 3 x 30 secs  Hamstring stretch 3 x 30 secs B  Jamar test position hip flexor stretch x1 min B  Kneeling hip flexors stretches into neutral, rotation and side flexion 2 x20 secs    manual therapy techniques: Joint mobilizations, Manual traction, and Myofacial release were applied to the: iliacus, glute medius and psoas, adductor for 8 minutes, including:  Long axis and short axis traction  Hypervolt massage gun to R glute, hamstring  Traction laterally with belt in hooklying into flexion and horizontal adduction  Self STM with knee on lateral gastrocnemius   Self lateral traction, then long axis with pullup assist bands pt education with self traction  PA and AP fib tib grade III on B gastrocnemius    therapeutic activities to improve functional performance for 23 minutes, including:  Side step with BTB 3 x 20' semisquat, or fully extended +with 10# KB on leading arm above head  Hip thruster 25# with BTB 2 x 10  walking lunges 2 x 40'  reverse lunge into  curtsy, extension and laterally  3 x 5 each  Sit to stand with 25# KB and BTB around the knees 2 x 10 +overhead press  Sit to stand with 10# KB overhead unilaterally and BTB around the knees 2 x 10   sled push/pull 70# x2 laps  hip hinge with yellow dowel x15   Deadlift x20 with 25#    gait training to improve functional mobility and safety for 0 minutes, including:      Patient Education and Home Exercises       Education provided:   - HEP, pain management    Written Home Exercises Provided: Patient instructed to cont prior HEP. Exercises were reviewed and Jarod was able to demonstrate them prior to the end of the session.  Jarod demonstrated good  understanding of the education provided. See EMR under Patient Instructions for exercises provided during therapy sessions    Assessment     Jarod reports significant improvement in condition and ability to tolerate car ride and moving his son into an apartment without any  significant increase in pain. He has met most of his PT goals and is agreeable to discharge to HEP today.     Jarod Is progressing well towards his goals.   Pt prognosis is Good.     Pt will continue to benefit from skilled outpatient physical therapy to address the deficits listed in the problem list box on initial evaluation, provide pt/family education and to maximize pt's level of independence in the home and community environment.     Pt's spiritual, cultural and educational needs considered and pt agreeable to plan of care and goals.     Anticipated barriers to physical therapy: none    Goals: (not met, progressing unless otherwise specified)  STG 3 weeks   1.  Pt to be independent with HEP for increased functional mobility and pain control. MET 7/24/23  2.  Pt to increase AROM at 100 degs trunk flexion for increased functional mobility and transfers. MET 7/24/23  3.  Pt to decrease pain to less than 2/10 after session for increased functional mobility. Met 6/22/23     Long Term Goals: 8 weeks   1.  Pt to be independent with pain management strategies and verbalize 2 strategies for increased functional mobility and pain control. MET 7/24/23  2.  Pt to increase AROM at B hip extension 25 degs for increased functional mobility and transfers.MET 7/24/23  3.  Pt to decrease pain to less than 1/10 after full day of work for increased functional mobility. MET  4.  Pt to increase hip extension strength on R to 4+/5 for increased standing tolerance and mobility. Not assessed  5.  Pt to increase tolerance to 30 minutes of continuous walking for return to recreational exercise. MET    Plan     Pt is discharged from skilled PT today.     Teresa Javier, PT

## 2023-09-28 ENCOUNTER — OFFICE VISIT (OUTPATIENT)
Dept: PRIMARY CARE CLINIC | Facility: CLINIC | Age: 48
End: 2023-09-28
Payer: COMMERCIAL

## 2023-09-28 VITALS
HEIGHT: 71 IN | HEART RATE: 103 BPM | DIASTOLIC BLOOD PRESSURE: 88 MMHG | OXYGEN SATURATION: 97 % | SYSTOLIC BLOOD PRESSURE: 124 MMHG | WEIGHT: 185.19 LBS | BODY MASS INDEX: 25.93 KG/M2

## 2023-09-28 DIAGNOSIS — M26.609 TMJ (TEMPOROMANDIBULAR JOINT SYNDROME): ICD-10-CM

## 2023-09-28 DIAGNOSIS — J32.9 BACTERIAL SINUSITIS: Primary | ICD-10-CM

## 2023-09-28 DIAGNOSIS — B96.89 BACTERIAL SINUSITIS: Primary | ICD-10-CM

## 2023-09-28 PROCEDURE — 3008F BODY MASS INDEX DOCD: CPT | Mod: CPTII,S$GLB,, | Performed by: STUDENT IN AN ORGANIZED HEALTH CARE EDUCATION/TRAINING PROGRAM

## 2023-09-28 PROCEDURE — 1159F PR MEDICATION LIST DOCUMENTED IN MEDICAL RECORD: ICD-10-PCS | Mod: CPTII,S$GLB,, | Performed by: STUDENT IN AN ORGANIZED HEALTH CARE EDUCATION/TRAINING PROGRAM

## 2023-09-28 PROCEDURE — 99213 OFFICE O/P EST LOW 20 MIN: CPT | Mod: S$GLB,,, | Performed by: STUDENT IN AN ORGANIZED HEALTH CARE EDUCATION/TRAINING PROGRAM

## 2023-09-28 PROCEDURE — 99999 PR PBB SHADOW E&M-EST. PATIENT-LVL IV: CPT | Mod: PBBFAC,,, | Performed by: STUDENT IN AN ORGANIZED HEALTH CARE EDUCATION/TRAINING PROGRAM

## 2023-09-28 PROCEDURE — 3008F PR BODY MASS INDEX (BMI) DOCUMENTED: ICD-10-PCS | Mod: CPTII,S$GLB,, | Performed by: STUDENT IN AN ORGANIZED HEALTH CARE EDUCATION/TRAINING PROGRAM

## 2023-09-28 PROCEDURE — 3074F PR MOST RECENT SYSTOLIC BLOOD PRESSURE < 130 MM HG: ICD-10-PCS | Mod: CPTII,S$GLB,, | Performed by: STUDENT IN AN ORGANIZED HEALTH CARE EDUCATION/TRAINING PROGRAM

## 2023-09-28 PROCEDURE — 3074F SYST BP LT 130 MM HG: CPT | Mod: CPTII,S$GLB,, | Performed by: STUDENT IN AN ORGANIZED HEALTH CARE EDUCATION/TRAINING PROGRAM

## 2023-09-28 PROCEDURE — 1160F RVW MEDS BY RX/DR IN RCRD: CPT | Mod: CPTII,S$GLB,, | Performed by: STUDENT IN AN ORGANIZED HEALTH CARE EDUCATION/TRAINING PROGRAM

## 2023-09-28 PROCEDURE — 1159F MED LIST DOCD IN RCRD: CPT | Mod: CPTII,S$GLB,, | Performed by: STUDENT IN AN ORGANIZED HEALTH CARE EDUCATION/TRAINING PROGRAM

## 2023-09-28 PROCEDURE — 99999 PR PBB SHADOW E&M-EST. PATIENT-LVL IV: ICD-10-PCS | Mod: PBBFAC,,, | Performed by: STUDENT IN AN ORGANIZED HEALTH CARE EDUCATION/TRAINING PROGRAM

## 2023-09-28 PROCEDURE — 3079F PR MOST RECENT DIASTOLIC BLOOD PRESSURE 80-89 MM HG: ICD-10-PCS | Mod: CPTII,S$GLB,, | Performed by: STUDENT IN AN ORGANIZED HEALTH CARE EDUCATION/TRAINING PROGRAM

## 2023-09-28 PROCEDURE — 99213 PR OFFICE/OUTPT VISIT, EST, LEVL III, 20-29 MIN: ICD-10-PCS | Mod: S$GLB,,, | Performed by: STUDENT IN AN ORGANIZED HEALTH CARE EDUCATION/TRAINING PROGRAM

## 2023-09-28 PROCEDURE — 1160F PR REVIEW ALL MEDS BY PRESCRIBER/CLIN PHARMACIST DOCUMENTED: ICD-10-PCS | Mod: CPTII,S$GLB,, | Performed by: STUDENT IN AN ORGANIZED HEALTH CARE EDUCATION/TRAINING PROGRAM

## 2023-09-28 PROCEDURE — 3079F DIAST BP 80-89 MM HG: CPT | Mod: CPTII,S$GLB,, | Performed by: STUDENT IN AN ORGANIZED HEALTH CARE EDUCATION/TRAINING PROGRAM

## 2023-09-28 RX ORDER — AMOXICILLIN AND CLAVULANATE POTASSIUM 875; 125 MG/1; MG/1
1 TABLET, FILM COATED ORAL EVERY 12 HOURS
Qty: 20 TABLET | Refills: 0 | Status: SHIPPED | OUTPATIENT
Start: 2023-09-28 | End: 2023-10-08

## 2023-09-28 RX ORDER — CYCLOBENZAPRINE HCL 10 MG
10 TABLET ORAL 3 TIMES DAILY PRN
Qty: 30 TABLET | Refills: 0 | Status: SHIPPED | OUTPATIENT
Start: 2023-09-28 | End: 2023-10-08

## 2023-09-28 NOTE — PROGRESS NOTES
INTERNAL MEDICINE SAME DAY PRIMARY CARE VISIT NOTE    Subjective:     Chief Complaint: Otalgia       Patient ID: Jarod Arias Sr. is a 48 y.o. male with , here today for focused same-day primary care visit.    Today, patient with complaint of facial pain    R jaw and maxillary facial pain for several days. Recent illness in August. No current fevers. + R ear pressure.    Past Medical History:  Past Medical History:   Diagnosis Date    Allergy     Elevated blood pressure reading without diagnosis of hypertension     Gout     Left wrist fracture        Home Medications:  Prior to Admission medications    Medication Sig Start Date End Date Taking? Authorizing Provider   allopurinoL (ZYLOPRIM) 300 MG tablet TAKE 1 TABLET(300 MG) BY MOUTH EVERY DAY 7/11/23  Yes Roxana Whitman MD   ALPRAZolam (XANAX) 0.5 MG tablet Take 1 tablet (0.5 mg total) by mouth daily as needed for Anxiety. 2/23/23 9/28/23 Yes Alex Schofield MD   azelastine (ASTELIN) 137 mcg (0.1 %) nasal spray 2 sprays (274 mcg total) by Nasal route 2 (two) times daily. 3/12/20  Yes Ana Simpson MD   cetirizine (ZYRTEC) 10 MG tablet Take 10 mg by mouth once daily.   Yes Provider, Historical   diphenhydrAMINE (BENADRYL) 25 mg capsule Take 25 mg by mouth every 6 (six) hours as needed for Itching.   Yes Provider, Historical   pseudoephedrine (SUDAFED) 30 MG tablet Take 30 mg by mouth every 4 (four) hours as needed for Congestion.   Yes Provider, Historical   amoxicillin-clavulanate 875-125mg (AUGMENTIN) 875-125 mg per tablet Take 1 tablet by mouth every 12 (twelve) hours.  Patient not taking: Reported on 9/28/2023 8/4/23   Keon Metz MD   ciprofloxacin HCl (CIPRO) 500 MG tablet Take 1 tablet (500 mg total) by mouth every 12 (twelve) hours.  Patient not taking: Reported on 8/4/2023 5/19/23   Keon Metz MD   fluticasone propionate (FLONASE) 50 mcg/actuation nasal spray 1 spray by Each Nare route once daily.    Provider,  "Historical   hydrocortisone 2.5 % cream Apply topically 2 (two) times daily.  Patient not taking: Reported on 9/28/2023 10/4/22   Sandi Wong NP-C   ondansetron (ZOFRAN-ODT) 4 MG TbDL Take 1 tablet (4 mg total) by mouth every 8 (eight) hours as needed (nausea).  Patient not taking: Reported on 9/28/2023 5/19/23   Keon Metz MD   triamcinolone acetonide 0.5% (KENALOG) 0.5 % Crea Apply topically 2 (two) times daily.  Patient not taking: Reported on 9/28/2023 10/4/22   Sandi Wong NP-C       Allergies:  Review of patient's allergies indicates:   Allergen Reactions    No known drug allergies        Social History:  Social History     Tobacco Use    Smoking status: Never    Smokeless tobacco: Former   Substance Use Topics    Alcohol use: Yes     Alcohol/week: 2.0 standard drinks of alcohol     Types: 2 Drinks containing 0.5 oz of alcohol per week    Drug use: No         Review of Systems   Constitutional:  Negative for activity change, appetite change, fatigue and unexpected weight change.   HENT:  Positive for congestion and sinus pain. Negative for hearing loss, postnasal drip, sinus pressure and sore throat.    Eyes:  Negative for visual disturbance.   Respiratory:  Negative for cough and shortness of breath.    Cardiovascular:  Negative for chest pain and palpitations.   Gastrointestinal:  Negative for abdominal pain, constipation and diarrhea.   Skin:  Negative for rash.   Neurological:  Negative for weakness.   Psychiatric/Behavioral:  The patient is not nervous/anxious.            Objective:   /88 (BP Location: Left arm, Patient Position: Sitting, BP Method: Medium (Manual))   Pulse 103   Ht 5' 11" (1.803 m)   Wt 84 kg (185 lb 3 oz)   SpO2 97%   BMI 25.83 kg/m²        General: AAO x3, no apparent distress  HEENT: PERRL, OP clear, + R maxillary sinus tenderness and TMJ joint tenderness.  CV: RRR, no m/r/g  Pulm: Lungs CTAB, no crackles, no wheezes  Abd: s/NT/ND " +BS  Extremities: no c/c/e    Labs:         Assessment/Plan     Jarod was seen today for otalgia.    Diagnoses and all orders for this visit:    Bacterial sinusitis  -     amoxicillin-clavulanate 875-125mg (AUGMENTIN) 875-125 mg per tablet; Take 1 tablet by mouth every 12 (twelve) hours. for 10 days    TMJ (temporomandibular joint syndrome)  Suspect TMJ based on history and uphysical exam.  Prescribe flexiril for prn jaw pain  Advise warm compress prn and gentle stretching of jaw.  -     cyclobenzaprine (FLEXERIL) 10 MG tablet; Take 1 tablet (10 mg total) by mouth 3 (three) times daily as needed for Muscle spasms.        RTC prn and with PCP as per routine.    Marilia Ernandez MD  Department of Internal Medicine - [facility]

## 2023-12-12 RX ORDER — ALPRAZOLAM 0.5 MG/1
0.5 TABLET ORAL DAILY PRN
Qty: 10 TABLET | Refills: 0 | Status: SHIPPED | OUTPATIENT
Start: 2023-12-12 | End: 2024-01-11

## 2023-12-12 NOTE — TELEPHONE ENCOUNTER
No care due was identified.  [facility] Embedded Care Due Messages. Reference number: [id number].   12/12/2023 12:22:54 PM CST

## 2023-12-20 DIAGNOSIS — M10.9 GOUT, ARTHRITIS: ICD-10-CM

## 2023-12-20 RX ORDER — ALLOPURINOL 300 MG/1
300 TABLET ORAL DAILY
Qty: 90 TABLET | Refills: 0 | OUTPATIENT
Start: 2023-12-20

## 2023-12-28 DIAGNOSIS — M10.9 GOUT, ARTHRITIS: ICD-10-CM

## 2023-12-28 RX ORDER — ALLOPURINOL 300 MG/1
300 TABLET ORAL DAILY
Qty: 90 TABLET | Refills: 0 | Status: SHIPPED | OUTPATIENT
Start: 2023-12-28 | End: 2024-04-01

## 2023-12-29 ENCOUNTER — OFFICE VISIT (OUTPATIENT)
Dept: URGENT CARE | Facility: CLINIC | Age: 48
End: 2023-12-29
Payer: COMMERCIAL

## 2023-12-29 VITALS
DIASTOLIC BLOOD PRESSURE: 92 MMHG | HEIGHT: 71 IN | HEART RATE: 89 BPM | BODY MASS INDEX: 25.9 KG/M2 | OXYGEN SATURATION: 98 % | SYSTOLIC BLOOD PRESSURE: 144 MMHG | TEMPERATURE: 98 F | RESPIRATION RATE: 18 BRPM | WEIGHT: 185 LBS

## 2023-12-29 DIAGNOSIS — J06.9 VIRAL URI WITH COUGH: Primary | ICD-10-CM

## 2023-12-29 LAB
CTP QC/QA: YES
SARS-COV-2 AG RESP QL IA.RAPID: NEGATIVE

## 2023-12-29 PROCEDURE — 99213 PR OFFICE/OUTPT VISIT, EST, LEVL III, 20-29 MIN: ICD-10-PCS | Mod: S$GLB,,, | Performed by: NURSE PRACTITIONER

## 2023-12-29 PROCEDURE — 99213 OFFICE O/P EST LOW 20 MIN: CPT | Mod: S$GLB,,, | Performed by: NURSE PRACTITIONER

## 2023-12-29 PROCEDURE — 87811 SARS-COV-2 COVID19 W/OPTIC: CPT | Mod: QW,S$GLB,, | Performed by: NURSE PRACTITIONER

## 2023-12-29 PROCEDURE — 87811 SARS CORONAVIRUS 2 ANTIGEN POCT, MANUAL READ: ICD-10-PCS | Mod: QW,S$GLB,, | Performed by: NURSE PRACTITIONER

## 2023-12-29 NOTE — PROGRESS NOTES
"Subjective:      Patient ID: Jarod Arias Sr. is a 48 y.o. male.    Vitals:  height is 5' 11" (1.803 m) and weight is 83.9 kg (185 lb). His oral temperature is 97.9 °F (36.6 °C). His blood pressure is 144/92 (abnormal) and his pulse is 89. His respiration is 18 and oxygen saturation is 98%.     Chief Complaint: Nasal Congestion (Entered by patient) and Cough    This is a 48 y.o. male who presents today with a chief complaint of sinus pain and pressure; with pnd, cough, headache, and runny nose.     Home Treatment Zyrtex, Benadryl     Cough  The current episode started yesterday. The problem has been gradually worsening. The problem occurs hourly. The cough is Non-productive. Associated symptoms include ear congestion, headaches, nasal congestion and postnasal drip. The treatment provided mild relief.       HENT:  Positive for postnasal drip.    Respiratory:  Positive for cough.    Neurological:  Positive for headaches.      Objective:     Physical Exam   Constitutional: He is oriented to person, place, and time. He appears well-developed. He is cooperative.  Non-toxic appearance. He does not appear ill. No distress.   HENT:   Head: Normocephalic.   Ears:   Right Ear: Hearing, tympanic membrane, external ear and ear canal normal.   Left Ear: Hearing, tympanic membrane, external ear and ear canal normal.   Nose: Congestion present. No mucosal edema, rhinorrhea or nasal deformity. No epistaxis. Right sinus exhibits no maxillary sinus tenderness and no frontal sinus tenderness. Left sinus exhibits no maxillary sinus tenderness and no frontal sinus tenderness.   Mouth/Throat: Uvula is midline and mucous membranes are normal. Mucous membranes are moist. No trismus in the jaw. Normal dentition. No uvula swelling. Posterior oropharyngeal erythema present. No oropharyngeal exudate or posterior oropharyngeal edema. Oropharynx is clear.   Eyes: Conjunctivae and lids are normal. No scleral icterus.   Neck: Trachea " normal and phonation normal. Neck supple. No edema present. No erythema present. No neck rigidity present.   Cardiovascular: Normal rate and regular rhythm.   Pulmonary/Chest: Effort normal and breath sounds normal. No respiratory distress. He has no decreased breath sounds. He has no wheezes. He has no rhonchi.   Abdominal: Normal appearance.   Musculoskeletal: Normal range of motion.         General: No deformity. Normal range of motion.   Neurological: He is alert and oriented to person, place, and time. He exhibits normal muscle tone. Coordination normal.   Skin: Skin is warm, dry, intact, not diaphoretic and not pale.   Psychiatric: His speech is normal and behavior is normal. Judgment and thought content normal.   Nursing note and vitals reviewed.    Results for orders placed or performed in visit on 12/29/23   SARS Coronavirus 2 Antigen, POCT Manual Read   Result Value Ref Range    SARS Coronavirus 2 Antigen Negative Negative     Acceptable Yes         Assessment:     1. Viral URI with cough        Plan:       Viral URI with cough  -     SARS Coronavirus 2 Antigen, POCT Manual Read

## 2024-01-04 ENCOUNTER — LAB VISIT (OUTPATIENT)
Dept: LAB | Facility: HOSPITAL | Age: 49
End: 2024-01-04
Attending: INTERNAL MEDICINE
Payer: COMMERCIAL

## 2024-01-04 ENCOUNTER — OFFICE VISIT (OUTPATIENT)
Dept: INTERNAL MEDICINE | Facility: CLINIC | Age: 49
End: 2024-01-04
Payer: COMMERCIAL

## 2024-01-04 VITALS
SYSTOLIC BLOOD PRESSURE: 132 MMHG | OXYGEN SATURATION: 99 % | DIASTOLIC BLOOD PRESSURE: 76 MMHG | WEIGHT: 192.69 LBS | HEIGHT: 71 IN | HEART RATE: 90 BPM | BODY MASS INDEX: 26.98 KG/M2

## 2024-01-04 DIAGNOSIS — R30.0 DYSURIA: ICD-10-CM

## 2024-01-04 DIAGNOSIS — Z12.5 PROSTATE CANCER SCREENING: ICD-10-CM

## 2024-01-04 DIAGNOSIS — Z00.00 ANNUAL PHYSICAL EXAM: Primary | ICD-10-CM

## 2024-01-04 DIAGNOSIS — Z12.11 COLON CANCER SCREENING: ICD-10-CM

## 2024-01-04 DIAGNOSIS — Z00.00 ANNUAL PHYSICAL EXAM: ICD-10-CM

## 2024-01-04 DIAGNOSIS — M10.9 GOUT, UNSPECIFIED CAUSE, UNSPECIFIED CHRONICITY, UNSPECIFIED SITE: ICD-10-CM

## 2024-01-04 LAB
ALBUMIN SERPL BCP-MCNC: 4.2 G/DL (ref 3.5–5.2)
ALP SERPL-CCNC: 57 U/L (ref 55–135)
ALT SERPL W/O P-5'-P-CCNC: 37 U/L (ref 10–44)
ANION GAP SERPL CALC-SCNC: 14 MMOL/L (ref 8–16)
AST SERPL-CCNC: 24 U/L (ref 10–40)
BACTERIA #/AREA URNS AUTO: NORMAL /HPF
BASOPHILS # BLD AUTO: 0.06 K/UL (ref 0–0.2)
BASOPHILS NFR BLD: 0.8 % (ref 0–1.9)
BILIRUB SERPL-MCNC: 0.6 MG/DL (ref 0.1–1)
BILIRUB UR QL STRIP: NEGATIVE
BUN SERPL-MCNC: 12 MG/DL (ref 6–20)
CALCIUM SERPL-MCNC: 9.6 MG/DL (ref 8.7–10.5)
CHLORIDE SERPL-SCNC: 104 MMOL/L (ref 95–110)
CHOLEST SERPL-MCNC: 190 MG/DL (ref 120–199)
CHOLEST/HDLC SERPL: 3.3 {RATIO} (ref 2–5)
CLARITY UR REFRACT.AUTO: CLEAR
CO2 SERPL-SCNC: 28 MMOL/L (ref 23–29)
COLOR UR AUTO: NORMAL
COMPLEXED PSA SERPL-MCNC: 0.32 NG/ML (ref 0–4)
CREAT SERPL-MCNC: 1.1 MG/DL (ref 0.5–1.4)
DIFFERENTIAL METHOD BLD: ABNORMAL
EOSINOPHIL # BLD AUTO: 0.2 K/UL (ref 0–0.5)
EOSINOPHIL NFR BLD: 2 % (ref 0–8)
ERYTHROCYTE [DISTWIDTH] IN BLOOD BY AUTOMATED COUNT: 14.1 % (ref 11.5–14.5)
EST. GFR  (NO RACE VARIABLE): >60 ML/MIN/1.73 M^2
GLUCOSE SERPL-MCNC: 93 MG/DL (ref 70–110)
GLUCOSE UR QL STRIP: NEGATIVE
HCT VFR BLD AUTO: 46.6 % (ref 40–54)
HDLC SERPL-MCNC: 57 MG/DL (ref 40–75)
HDLC SERPL: 30 % (ref 20–50)
HGB BLD-MCNC: 15.6 G/DL (ref 14–18)
HGB UR QL STRIP: NEGATIVE
IMM GRANULOCYTES # BLD AUTO: 0.08 K/UL (ref 0–0.04)
IMM GRANULOCYTES NFR BLD AUTO: 1.1 % (ref 0–0.5)
KETONES UR QL STRIP: NEGATIVE
LDLC SERPL CALC-MCNC: 114.8 MG/DL (ref 63–159)
LEUKOCYTE ESTERASE UR QL STRIP: NEGATIVE
LYMPHOCYTES # BLD AUTO: 1.8 K/UL (ref 1–4.8)
LYMPHOCYTES NFR BLD: 23.2 % (ref 18–48)
MCH RBC QN AUTO: 29.8 PG (ref 27–31)
MCHC RBC AUTO-ENTMCNC: 33.5 G/DL (ref 32–36)
MCV RBC AUTO: 89 FL (ref 82–98)
MICROSCOPIC COMMENT: NORMAL
MONOCYTES # BLD AUTO: 0.5 K/UL (ref 0.3–1)
MONOCYTES NFR BLD: 7.2 % (ref 4–15)
NEUTROPHILS # BLD AUTO: 5 K/UL (ref 1.8–7.7)
NEUTROPHILS NFR BLD: 65.7 % (ref 38–73)
NITRITE UR QL STRIP: NEGATIVE
NONHDLC SERPL-MCNC: 133 MG/DL
NRBC BLD-RTO: 0 /100 WBC
PH UR STRIP: 6 [PH] (ref 5–8)
PLATELET # BLD AUTO: 310 K/UL (ref 150–450)
PMV BLD AUTO: 8.9 FL (ref 9.2–12.9)
POTASSIUM SERPL-SCNC: 4.1 MMOL/L (ref 3.5–5.1)
PROT SERPL-MCNC: 7.8 G/DL (ref 6–8.4)
PROT UR QL STRIP: NEGATIVE
RBC # BLD AUTO: 5.24 M/UL (ref 4.6–6.2)
RBC #/AREA URNS AUTO: 0 /HPF (ref 0–4)
SODIUM SERPL-SCNC: 146 MMOL/L (ref 136–145)
SP GR UR STRIP: 1.01 (ref 1–1.03)
TRIGL SERPL-MCNC: 91 MG/DL (ref 30–150)
URATE SERPL-MCNC: 5.4 MG/DL (ref 3.4–7)
URN SPEC COLLECT METH UR: NORMAL
WBC # BLD AUTO: 7.53 K/UL (ref 3.9–12.7)

## 2024-01-04 PROCEDURE — 84550 ASSAY OF BLOOD/URIC ACID: CPT | Performed by: INTERNAL MEDICINE

## 2024-01-04 PROCEDURE — 36415 COLL VENOUS BLD VENIPUNCTURE: CPT | Performed by: INTERNAL MEDICINE

## 2024-01-04 PROCEDURE — 84153 ASSAY OF PSA TOTAL: CPT | Performed by: INTERNAL MEDICINE

## 2024-01-04 PROCEDURE — 85025 COMPLETE CBC W/AUTO DIFF WBC: CPT | Performed by: INTERNAL MEDICINE

## 2024-01-04 PROCEDURE — 1159F MED LIST DOCD IN RCRD: CPT | Mod: CPTII,S$GLB,, | Performed by: INTERNAL MEDICINE

## 2024-01-04 PROCEDURE — 87086 URINE CULTURE/COLONY COUNT: CPT | Performed by: INTERNAL MEDICINE

## 2024-01-04 PROCEDURE — 80061 LIPID PANEL: CPT | Performed by: INTERNAL MEDICINE

## 2024-01-04 PROCEDURE — 3078F DIAST BP <80 MM HG: CPT | Mod: CPTII,S$GLB,, | Performed by: INTERNAL MEDICINE

## 2024-01-04 PROCEDURE — 99396 PREV VISIT EST AGE 40-64: CPT | Mod: S$GLB,,, | Performed by: INTERNAL MEDICINE

## 2024-01-04 PROCEDURE — 81001 URINALYSIS AUTO W/SCOPE: CPT | Performed by: INTERNAL MEDICINE

## 2024-01-04 PROCEDURE — 3075F SYST BP GE 130 - 139MM HG: CPT | Mod: CPTII,S$GLB,, | Performed by: INTERNAL MEDICINE

## 2024-01-04 PROCEDURE — 80053 COMPREHEN METABOLIC PANEL: CPT | Performed by: INTERNAL MEDICINE

## 2024-01-04 PROCEDURE — 99999 PR PBB SHADOW E&M-EST. PATIENT-LVL IV: CPT | Mod: PBBFAC,,, | Performed by: INTERNAL MEDICINE

## 2024-01-04 PROCEDURE — 3008F BODY MASS INDEX DOCD: CPT | Mod: CPTII,S$GLB,, | Performed by: INTERNAL MEDICINE

## 2024-01-04 NOTE — PROGRESS NOTES
CC:  Annual exam     HPI:  The patient is a 48-year-old male with gout who presents today for annual exam.  The patient does report having a laceration to his index finger.  This was glued shut at [facility].    Answers submitted by the patient for this visit:  Review of Systems Questionnaire (Submitted on 1/3/2024)  activity change: No  unexpected weight change: No  neck pain: No  hearing loss: No  rhinorrhea: No  trouble swallowing: No  eye discharge: No  visual disturbance: No  chest tightness: No  wheezing: No  chest pain: No  palpitations: No  blood in stool: No  constipation: No  vomiting: No  diarrhea: No  polydipsia: No  polyuria: No  difficulty urinating: No  urgency: No  hematuria: No  joint swelling: No  arthralgias: No  headaches: No  weakness: No  confusion: No  dysphoric mood: No  The patient reports gaining about 10 lb.  He does get his eyes checked at [facility].  No auditory changes.  He has not been walking lately.  The patient did have an episode of a viral syndrome.  Does report some discomfort with urination.  This started a few days ago.    Physical exam:   General appearance:  No acute distress   HEENT: Conjunctiva is clear.  Pupils equal reactive.  TMs are clear.  Nasal septum is midline without discharge.  Oropharynx is without erythema.  Trachea is midline without JVD or thyromegaly.  Pulmonary:  Good inspiratory, expiratory breath sounds are heard.  Lungs are clear auscultation.  Cardiovascular:  S1-S2, rhythm is regular.  2+ carotid pulse bruits.  Extremities without edema.  GI: Abdomen is nontender, nondistended without hepatosplenomegaly  :  Digital rectal exam was performed.  The rectum was normal.  Stools brown heme-negative.  Prostate was without masses or nodules.  Penis and testes were normal.    Lymphatics:  No cervical, axillary inguinal adenopathy    Assessment:  1.  Annual exam   2. Gout   3.  Dysuria    Plan 1.  Will schedule a CBC, CMP, panel,  PSA, UA, urine culture and uric acid  2. Will put the patient for screening colonoscopy

## 2024-01-05 DIAGNOSIS — E87.0 SERUM SODIUM ELEVATED: Primary | ICD-10-CM

## 2024-01-05 LAB — BACTERIA UR CULT: NO GROWTH

## 2024-01-08 ENCOUNTER — PATIENT MESSAGE (OUTPATIENT)
Dept: INTERNAL MEDICINE | Facility: CLINIC | Age: 49
End: 2024-01-08
Payer: COMMERCIAL

## 2024-01-08 ENCOUNTER — TELEPHONE (OUTPATIENT)
Dept: INTERNAL MEDICINE | Facility: CLINIC | Age: 49
End: 2024-01-08
Payer: COMMERCIAL

## 2024-01-08 NOTE — TELEPHONE ENCOUNTER
----- Message from Alex Schofield MD sent at 1/5/2024  6:45 PM CST -----  Please contact patient. His blood tests looked good. His sodium was one point above normal. This does not indicate a problem. All I would like to do is repeat a sodium level in one month. An order is in. He does not need to fast for this.

## 2024-02-16 ENCOUNTER — OFFICE VISIT (OUTPATIENT)
Dept: INTERNAL MEDICINE | Facility: CLINIC | Age: 49
End: 2024-02-16
Payer: COMMERCIAL

## 2024-02-16 ENCOUNTER — HOSPITAL ENCOUNTER (OUTPATIENT)
Dept: RADIOLOGY | Facility: HOSPITAL | Age: 49
Discharge: HOME | End: 2024-02-16
Attending: INTERNAL MEDICINE
Payer: COMMERCIAL

## 2024-02-16 VITALS
DIASTOLIC BLOOD PRESSURE: 72 MMHG | BODY MASS INDEX: 26.81 KG/M2 | HEIGHT: 71 IN | SYSTOLIC BLOOD PRESSURE: 124 MMHG | HEART RATE: 95 BPM | TEMPERATURE: 98 F | OXYGEN SATURATION: 98 % | WEIGHT: 191.5 LBS

## 2024-02-16 DIAGNOSIS — R10.9 ABDOMINAL PAIN, UNSPECIFIED ABDOMINAL LOCATION: Primary | ICD-10-CM

## 2024-02-16 DIAGNOSIS — R10.9 ABDOMINAL PAIN, UNSPECIFIED ABDOMINAL LOCATION: ICD-10-CM

## 2024-02-16 PROCEDURE — 99999 PR PBB SHADOW E&M-EST. PATIENT-LVL IV: CPT | Mod: PBBFAC,,, | Performed by: INTERNAL MEDICINE

## 2024-02-16 PROCEDURE — 1159F MED LIST DOCD IN RCRD: CPT | Mod: CPTII,S$GLB,, | Performed by: INTERNAL MEDICINE

## 2024-02-16 PROCEDURE — 3008F BODY MASS INDEX DOCD: CPT | Mod: CPTII,S$GLB,, | Performed by: INTERNAL MEDICINE

## 2024-02-16 PROCEDURE — 3074F SYST BP LT 130 MM HG: CPT | Mod: CPTII,S$GLB,, | Performed by: INTERNAL MEDICINE

## 2024-02-16 PROCEDURE — 74177 CT ABD & PELVIS W/CONTRAST: CPT | Mod: 26,,, | Performed by: INTERNAL MEDICINE

## 2024-02-16 PROCEDURE — 81001 URINALYSIS AUTO W/SCOPE: CPT | Performed by: INTERNAL MEDICINE

## 2024-02-16 PROCEDURE — 25500020 PHARM REV CODE 255: Performed by: INTERNAL MEDICINE

## 2024-02-16 PROCEDURE — A9698 NON-RAD CONTRAST MATERIALNOC: HCPCS | Performed by: INTERNAL MEDICINE

## 2024-02-16 PROCEDURE — 74177 CT ABD & PELVIS W/CONTRAST: CPT | Mod: TC

## 2024-02-16 PROCEDURE — 87086 URINE CULTURE/COLONY COUNT: CPT | Performed by: INTERNAL MEDICINE

## 2024-02-16 PROCEDURE — 99213 OFFICE O/P EST LOW 20 MIN: CPT | Mod: S$GLB,,, | Performed by: INTERNAL MEDICINE

## 2024-02-16 PROCEDURE — 3078F DIAST BP <80 MM HG: CPT | Mod: CPTII,S$GLB,, | Performed by: INTERNAL MEDICINE

## 2024-02-16 RX ORDER — CIPROFLOXACIN 500 MG/1
500 TABLET ORAL EVERY 12 HOURS
Qty: 14 TABLET | Refills: 0 | Status: SHIPPED | OUTPATIENT
Start: 2024-02-16 | End: 2024-03-05

## 2024-02-16 RX ADMIN — BARIUM SULFATE 450 ML: 20 SUSPENSION ORAL at 06:02

## 2024-02-16 RX ADMIN — IOHEXOL 100 ML: 350 INJECTION, SOLUTION INTRAVENOUS at 06:02

## 2024-02-16 NOTE — PROGRESS NOTES
Subjective:       Patient ID: Jarod Arias Sr. is a 48 y.o. male.    Chief Complaint: Fatigue, Nausea, Abdominal Pain, and Headache    Fatigue  Associated symptoms include abdominal pain, fatigue, headaches and nausea.   Nausea  Associated symptoms include abdominal pain, fatigue, headaches and nausea.   Abdominal Pain  Associated symptoms include headaches and nausea.   Headache   Associated symptoms include abdominal pain and nausea.   Pt ate Wednesday evening and within one hour started having significant abdominal pain -LUQ and RUQ.  No N/V but some slight diarrhea.  He slept all day yesterday and temp was 103 last night.  He took advil and had a sweat then woke with 103 fever this AM took advil.  He is feeling better now.  No abdominal pain since Wednesday.  No N/V.  Had a small loose stool today and is passing gas.  He is afebrile now - took advil this AM.  He had COVID about 3 weeks ago.  Son at same meal with no effects.  He had a similar presentation in May 2023 (upper abdominal pain with assoc fever) lab showed elevated LFTs and CT was negative.  Resolved with cipro.    Review of Systems   Constitutional:  Positive for fatigue.   Gastrointestinal:  Positive for abdominal pain and nausea.   Neurological:  Positive for headaches.       Objective:      Physical Exam  Constitutional:       General: He is not in acute distress.     Appearance: Normal appearance. He is well-developed. He is not ill-appearing.   Eyes:      Pupils: Pupils are equal, round, and reactive to light.   Neck:      Thyroid: No thyromegaly.      Vascular: No JVD.   Cardiovascular:      Rate and Rhythm: Normal rate and regular rhythm.      Heart sounds: Normal heart sounds. No murmur heard.     No friction rub. No gallop.   Pulmonary:      Effort: Pulmonary effort is normal.      Breath sounds: Normal breath sounds. No wheezing or rales.      Comments: Distant - no wheeze  Abdominal:      General: Bowel sounds are normal. There is  no distension.      Palpations: Abdomen is soft. There is no mass.      Tenderness: There is no abdominal tenderness. There is no guarding or rebound.   Musculoskeletal:      Cervical back: Neck supple.   Lymphadenopathy:      Cervical: No cervical adenopathy.   Skin:     General: Skin is warm and dry.   Neurological:      Mental Status: He is alert and oriented to person, place, and time.   Psychiatric:         Behavior: Behavior normal.         Thought Content: Thought content normal.         Judgment: Judgment normal.         Assessment:       1. Abdominal pain, unspecified abdominal location        Plan:   Abdominal pain, unspecified abdominal location  -     CT Abdomen Pelvis With IV Contrast Routine Oral Contrast; Future; Expected date: 02/16/2024  -     Urinalysis  -     Urine culture  -     CBC Auto Differential; Future; Expected date: 02/16/2024  -     Comprehensive Metabolic Panel; Future; Expected date: 02/16/2024  -     Sedimentation rate; Future; Expected date: 02/16/2024  -     C-Reactive Protein; Future; Expected date: 02/16/2024  -     Amylase; Future; Expected date: 02/16/2024  -     Lipase; Future; Expected date: 02/16/2024    Other orders  -     ciprofloxacin HCl (CIPRO) 500 MG tablet; Take 1 tablet (500 mg total) by mouth every 12 (twelve) hours.  Dispense: 14 tablet; Refill: 0    ED for worsening symptoms.

## 2024-02-16 NOTE — PROGRESS NOTES
Subjective:       Patient ID: Jarod Arias Sr. is a 48 y.o. male with gout    Chief Complaint: No chief complaint on file.  LV 7/5/22    HPI   48 yr old man with gout seen initially on 7/25/18 & last on 7//5/22.  He is only on allopurinol 300 mg/d which he had been spacing out due to no refills.  He still is doing well gout wise without any attacks or renal calculi.  He has not had to use colchicine for years.   Last uric acid in January was 5.4    Recent hx of abd pain w fever asoc w abn LFTs. Had it previously. Saw Dr. Nix  CT showed hepatosplenomegaly & diverticulosis.   CMV IgG & some EBV abs were elevated otherwise labs were ok.    LBP controlled; had PT      Prior hx  Was running out of allopurinol and has been taking only 100 mg of allopurinol/day over that last 2 months or so. Had labs but did not have UA level done.  One year ago it 5.0 on 200 mg of allopurinol daily. He tolerates allopurinol well and has not had to use colchicine at all but requests a new prescription as it is very old.   He denies renal calculi & has refrained from drinking beer and trying to stick to a diet.     Pertinent hx  On Aug 17, 2017 felt like he stubbed his R big toe. It became sore, red & painful to touch & swollen.  Saw Dr. Velazquez and she felt it was gout.  He was given information and prescribed indomethacin but he did not take it. It resolved wi 2 days on its own.    Has subsequently had small attacks always in his R toe --4-5 episodes characterized mostly by soreness, but he was able to walk. These tended to resolve on their own within 1 day.  In October had an attack that lasted a bit longer and also had an episode on 11/13 that took 2 days to resolve. Ibuprofen 400 mg helped.  Was started on allopurinol 100 mg/d about 1 month ago.   Used to drink once a week but over the summer drank more. He drank twice a week anywhere from 2-4 beers at a time. No homemade liquor. Had also gotten heavy and had poor eating  habits. Now has been paying attention to his diet and has lost at least 20 lbs in 3 months. No personal hx of renal calculi. No Pb exposure. No renal calculi.   Has a maternal uncle that probably has gout.  No family hx of kidney stones.   Current Outpatient Medications   Medication Sig Dispense Refill    allopurinoL (ZYLOPRIM) 300 MG tablet Take 1 tablet (300 mg total) by mouth once daily. 90 tablet 0    diphenhydrAMINE (BENADRYL) 25 mg capsule Take 25 mg by mouth every 6 (six) hours as needed for Itching.      pseudoephedrine (SUDAFED) 30 MG tablet Take 30 mg by mouth every 4 (four) hours as needed for Congestion.      ALPRAZolam (XANAX) 0.5 MG tablet Take 1 tablet (0.5 mg total) by mouth daily as needed for Anxiety. 10 tablet 0    cetirizine (ZYRTEC) 10 MG tablet Take 10 mg by mouth once daily.      fluticasone propionate (FLONASE) 50 mcg/actuation nasal spray 1 spray by Each Nare route once daily.       No current facility-administered medications for this visit.           Review of patient's allergies indicates:   Allergen Reactions    No known drug allergies        Past Medical History:   Diagnosis Date    Allergy     Elevated blood pressure reading without diagnosis of hypertension     Gout     Left wrist fracture      Past Surgical History:   Procedure Laterality Date    PILONIDAL CYST DRAINAGE      WISDOM TOOTH EXTRACTION             Review of Systems   Constitutional: Negative.  Negative for fatigue, fever and unexpected weight change.   HENT: Negative.  Negative for hearing loss, mouth sores, sore throat, tinnitus and trouble swallowing.    Eyes: Negative.  Negative for redness and visual disturbance.   Respiratory: Negative.  Negative for cough, choking, chest tightness and shortness of breath.    Cardiovascular: Negative.  Negative for chest pain, palpitations and leg swelling.   Gastrointestinal: Negative.  Negative for abdominal pain, blood in stool, constipation, diarrhea, nausea and vomiting.  "  Genitourinary: Negative.  Negative for frequency, genital sores, hematuria and urgency.   Musculoskeletal: Negative.  Negative for back pain, myalgias, neck pain and neck stiffness.   Skin: Negative.  Negative for rash.   Neurological: Negative.  Negative for dizziness, syncope, numbness and headaches.   Hematological:  Does not bruise/bleed easily.   Psychiatric/Behavioral: Negative.  Negative for dysphoric mood and sleep disturbance. The patient is not nervous/anxious.        Family History   Problem Relation Age of Onset    Cancer Maternal Grandmother 65        Lung    Cancer Maternal Grandfather     Allergic rhinitis Father     COPD Mother     Cancer Paternal Grandfather     Allergies Neg Hx     Angioedema Neg Hx     Asthma Neg Hx     Atopy Neg Hx     Eczema Neg Hx     Immunodeficiency Neg Hx     Urticaria Neg Hx     Rhinitis Neg Hx      M:  at 60: COPD;   F: HBP, CAD,  Maternal uncle with probable gout.   Father w single episode of gout; HTN; CAD  1 S: A& W  Teen sons in good health.     Social History     Tobacco Use    Smoking status: Never    Smokeless tobacco: Former   Substance Use Topics    Alcohol use: Yes     Alcohol/week: 2.0 standard drinks of alcohol     Types: 2 Drinks containing 0.5 oz of alcohol per week    Drug use: No   Is an .    Is active.   Objective:  /75 (BP Location: Right arm)   Pulse 75   Ht 5' 10" (1.778 m)   Wt 85 kg (187 lb 6.3 oz)   BMI 26.89 kg/m²      Physical Exam   Constitutional: He is oriented to person, place, and time. No distress.   HENT:   Head: Normocephalic and atraumatic.   Mouth/Throat: Oropharynx is clear and moist. No oropharyngeal exudate.   No facial rashes  Parotids not enlarged  No oral ulcers   Eyes: Pupils are equal, round, and reactive to light. Conjunctivae are normal. Right eye exhibits no discharge. Left eye exhibits no discharge. No scleral icterus.   Neck: No JVD present. No tracheal deviation present. No thyromegaly present. "   Cardiovascular: Normal rate, regular rhythm and normal heart sounds. Exam reveals no gallop and no friction rub.   No murmur heard.  Pulmonary/Chest: Effort normal and breath sounds normal. No respiratory distress. He has no wheezes. He has no rales. He exhibits no tenderness.   Abdominal: Soft. Bowel sounds are normal. He exhibits no distension and no mass. There is no splenomegaly or hepatomegaly. There is no abdominal tenderness. There is no rebound and no guarding.   Musculoskeletal:         General: No tenderness. Normal range of motion.      Cervical back: Neck supple.      Comments: Cspine FROM no tenderness  Tspine FROM no tenderness  Lspine FROM no tenderness.  TMJ: unremarkable  Shoulders: FROM; no synovitis;  Elbows: FROM; no synovitis; no tophi or nodules  Wrists: FROM; no synovitis;    MCPs: FROM; no synovitis; no metacarpalgia;  ok;  PIPs:FROM; no synovitis;   DIPs: FROM; no synovitis;   HIPS: FROM  Knees: FROM; no synovitis; no instability;  Ankles: FROM: no synovitis   Toes: ok; no metatarsalgia;        Lymphadenopathy:     He has no cervical adenopathy. No inguinal adenopathy noted on the right or left side.   Neurological: He is alert and oriented to person, place, and time. He has normal reflexes. No cranial nerve deficit. Gait normal.   Proximal and distal muscle strength 5/5.   Skin: Skin is warm and dry. No rash noted. He is not diaphoretic.   Psychiatric: Mood, memory, affect and judgment normal.   Vitals reviewed.      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           Plan:   Keep on allopurinol 300 mg/d   Discussed comorbidities associated with gout again.  He was given the option of f/u w Rhe[facility] or his PCP.  He will f/u with Dr. Schofield.  Labs w UA should be at least yearly.  Weight loss; exercise.  RTC prn

## 2024-02-17 ENCOUNTER — PATIENT MESSAGE (OUTPATIENT)
Dept: INTERNAL MEDICINE | Facility: CLINIC | Age: 49
End: 2024-02-17
Payer: COMMERCIAL

## 2024-02-17 LAB
BACTERIA #/AREA URNS AUTO: NORMAL /HPF
BILIRUB UR QL STRIP: NEGATIVE
CLARITY UR REFRACT.AUTO: CLEAR
COLOR UR AUTO: YELLOW
GLUCOSE UR QL STRIP: NEGATIVE
HGB UR QL STRIP: ABNORMAL
KETONES UR QL STRIP: ABNORMAL
LEUKOCYTE ESTERASE UR QL STRIP: NEGATIVE
MICROSCOPIC COMMENT: NORMAL
NITRITE UR QL STRIP: NEGATIVE
PH UR STRIP: 6 [PH] (ref 5–8)
PROT UR QL STRIP: NEGATIVE
RBC #/AREA URNS AUTO: 2 /HPF (ref 0–4)
SP GR UR STRIP: 1.01 (ref 1–1.03)
URN SPEC COLLECT METH UR: ABNORMAL
WBC #/AREA URNS AUTO: 1 /HPF (ref 0–5)

## 2024-02-18 ENCOUNTER — TELEPHONE (OUTPATIENT)
Dept: INTERNAL MEDICINE | Facility: CLINIC | Age: 49
End: 2024-02-18
Payer: COMMERCIAL

## 2024-02-18 LAB — BACTERIA UR CULT: NO GROWTH

## 2024-02-18 NOTE — TELEPHONE ENCOUNTER
Spoke to patient - still had 103 temp at 3Pm yesterday (took ibuprofen) and 102.8 at 11PM (took tylenol). Temp is 99 today.  He had some mild LUQ pain yesterday. Eating but having some nausea.  I advised no tylenol or ibuprofen and if he spikes fever again to go to the ED.

## 2024-02-19 ENCOUNTER — TELEPHONE (OUTPATIENT)
Dept: INTERNAL MEDICINE | Facility: CLINIC | Age: 49
End: 2024-02-19
Payer: COMMERCIAL

## 2024-02-19 DIAGNOSIS — R10.9 ABDOMINAL PAIN, UNSPECIFIED ABDOMINAL LOCATION: Primary | ICD-10-CM

## 2024-02-20 ENCOUNTER — HOSPITAL ENCOUNTER (OUTPATIENT)
Dept: RADIOLOGY | Facility: HOSPITAL | Age: 49
Discharge: HOME | End: 2024-02-20
Attending: INTERNAL MEDICINE
Payer: COMMERCIAL

## 2024-02-20 DIAGNOSIS — R10.9 ABDOMINAL PAIN, UNSPECIFIED ABDOMINAL LOCATION: ICD-10-CM

## 2024-02-20 PROCEDURE — 76700 US EXAM ABDOM COMPLETE: CPT | Mod: TC

## 2024-02-20 PROCEDURE — 76700 US EXAM ABDOM COMPLETE: CPT | Mod: 26,,, | Performed by: RADIOLOGY

## 2024-03-01 ENCOUNTER — PATIENT MESSAGE (OUTPATIENT)
Dept: INTERNAL MEDICINE | Facility: CLINIC | Age: 49
End: 2024-03-01
Payer: COMMERCIAL

## 2024-03-01 NOTE — TELEPHONE ENCOUNTER
Held slot 03/12/24 12;30 Dr. Schofield, sent msg to [facility] and responded to pt with appt date/time.

## 2024-03-05 ENCOUNTER — OFFICE VISIT (OUTPATIENT)
Dept: RHEUMATOLOGY | Facility: CLINIC | Age: 49
End: 2024-03-05
Payer: COMMERCIAL

## 2024-03-05 ENCOUNTER — PATIENT OUTREACH (OUTPATIENT)
Dept: ADMINISTRATIVE | Facility: HOSPITAL | Age: 49
End: 2024-03-05
Payer: COMMERCIAL

## 2024-03-05 VITALS
BODY MASS INDEX: 26.82 KG/M2 | WEIGHT: 187.38 LBS | HEIGHT: 70 IN | DIASTOLIC BLOOD PRESSURE: 75 MMHG | HEART RATE: 75 BPM | SYSTOLIC BLOOD PRESSURE: 112 MMHG

## 2024-03-05 DIAGNOSIS — M54.9 DORSALGIA: ICD-10-CM

## 2024-03-05 DIAGNOSIS — Z79.899 ON ALLOPURINOL THERAPY: ICD-10-CM

## 2024-03-05 DIAGNOSIS — M10.9 GOUT, ARTHRITIS: Primary | ICD-10-CM

## 2024-03-05 PROCEDURE — 1159F MED LIST DOCD IN RCRD: CPT | Mod: CPTII,S$GLB,, | Performed by: INTERNAL MEDICINE

## 2024-03-05 PROCEDURE — 99214 OFFICE O/P EST MOD 30 MIN: CPT | Mod: S$GLB,,, | Performed by: INTERNAL MEDICINE

## 2024-03-05 PROCEDURE — 1160F RVW MEDS BY RX/DR IN RCRD: CPT | Mod: CPTII,S$GLB,, | Performed by: INTERNAL MEDICINE

## 2024-03-05 PROCEDURE — 99999 PR PBB SHADOW E&M-EST. PATIENT-LVL IV: CPT | Mod: PBBFAC,,, | Performed by: INTERNAL MEDICINE

## 2024-03-05 PROCEDURE — 3078F DIAST BP <80 MM HG: CPT | Mod: CPTII,S$GLB,, | Performed by: INTERNAL MEDICINE

## 2024-03-05 PROCEDURE — 3008F BODY MASS INDEX DOCD: CPT | Mod: CPTII,S$GLB,, | Performed by: INTERNAL MEDICINE

## 2024-03-05 PROCEDURE — 3074F SYST BP LT 130 MM HG: CPT | Mod: CPTII,S$GLB,, | Performed by: INTERNAL MEDICINE

## 2024-03-05 NOTE — PROGRESS NOTES
Population Health Chart Review & Patient Outreach Details      Additional [facility] Health Notes:    Per MEREDITH in basket message, pt declined flu vaccine 03/05/2024.           Updates Requested / Reviewed:      Updated Care Coordination Note, Care Everywhere, and Immunizations Reconciliation Completed or Queried: [place]         Health Maintenance Topics Overdue:    Health Maintenance Due   Topic Date Due    HIV Screening  Never done    Hemoglobin A1c (Diabetic Prevention Screening)  Never done    COVID-19 Vaccine (3 - 2023-24 season) 09/01/2023    Colorectal Cancer Screening  04/26/2024         Health Maintenance Topic(s) Outreach Outcomes & Actions Taken:

## 2024-03-05 NOTE — PATIENT INSTRUCTIONS
Try to lose a few lbs.  You may want to intermittent fasting  Don't eat anything for at least 4 hrs before going to bed.   Daily, aerobic, low impact, dedicated exercise.

## 2024-03-12 ENCOUNTER — LAB VISIT (OUTPATIENT)
Dept: LAB | Facility: HOSPITAL | Age: 49
End: 2024-03-12
Attending: INTERNAL MEDICINE
Payer: COMMERCIAL

## 2024-03-12 ENCOUNTER — OFFICE VISIT (OUTPATIENT)
Dept: INTERNAL MEDICINE | Facility: CLINIC | Age: 49
End: 2024-03-12
Payer: COMMERCIAL

## 2024-03-12 VITALS
BODY MASS INDEX: 20.89 KG/M2 | RESPIRATION RATE: 18 BRPM | DIASTOLIC BLOOD PRESSURE: 82 MMHG | SYSTOLIC BLOOD PRESSURE: 110 MMHG | WEIGHT: 145.94 LBS | OXYGEN SATURATION: 98 % | HEART RATE: 85 BPM | HEIGHT: 70 IN

## 2024-03-12 DIAGNOSIS — R74.01 ELEVATED TRANSAMINASE LEVEL: Primary | ICD-10-CM

## 2024-03-12 DIAGNOSIS — R74.01 ELEVATED TRANSAMINASE LEVEL: ICD-10-CM

## 2024-03-12 LAB
ALBUMIN SERPL BCP-MCNC: 4.7 G/DL (ref 3.5–5.2)
ALP SERPL-CCNC: 57 U/L (ref 55–135)
ALT SERPL W/O P-5'-P-CCNC: 17 U/L (ref 10–44)
AST SERPL-CCNC: 21 U/L (ref 10–40)
BILIRUB DIRECT SERPL-MCNC: 0.2 MG/DL (ref 0.1–0.3)
BILIRUB SERPL-MCNC: 0.6 MG/DL (ref 0.1–1)
PROT SERPL-MCNC: 7.4 G/DL (ref 6–8.4)

## 2024-03-12 PROCEDURE — 1159F MED LIST DOCD IN RCRD: CPT | Mod: CPTII,S$GLB,, | Performed by: INTERNAL MEDICINE

## 2024-03-12 PROCEDURE — 36415 COLL VENOUS BLD VENIPUNCTURE: CPT | Performed by: INTERNAL MEDICINE

## 2024-03-12 PROCEDURE — 3074F SYST BP LT 130 MM HG: CPT | Mod: CPTII,S$GLB,, | Performed by: INTERNAL MEDICINE

## 2024-03-12 PROCEDURE — 3008F BODY MASS INDEX DOCD: CPT | Mod: CPTII,S$GLB,, | Performed by: INTERNAL MEDICINE

## 2024-03-12 PROCEDURE — 99213 OFFICE O/P EST LOW 20 MIN: CPT | Mod: S$GLB,,, | Performed by: INTERNAL MEDICINE

## 2024-03-12 PROCEDURE — 80076 HEPATIC FUNCTION PANEL: CPT | Performed by: INTERNAL MEDICINE

## 2024-03-12 PROCEDURE — 86677 HELICOBACTER PYLORI ANTIBODY: CPT | Performed by: INTERNAL MEDICINE

## 2024-03-12 PROCEDURE — 99999 PR PBB SHADOW E&M-EST. PATIENT-LVL IV: CPT | Mod: PBBFAC,,, | Performed by: INTERNAL MEDICINE

## 2024-03-12 PROCEDURE — 3079F DIAST BP 80-89 MM HG: CPT | Mod: CPTII,S$GLB,, | Performed by: INTERNAL MEDICINE

## 2024-03-12 RX ORDER — ALPRAZOLAM 0.5 MG/1
0.5 TABLET ORAL DAILY PRN
COMMUNITY
Start: 2023-12-12

## 2024-03-12 NOTE — PROGRESS NOTES
CC:  Follow-up of abdominal pain and transaminitis    HPI:  The patient is a 40-year-old male with gout presents today follow-up of abdominal discomfort transaminitis.  The patient was seen on February 16th with complaints of fatigue, nausea and abdominal pain.  Pain was in the epigastric and left quadrant region.  He was running a temperature of 103° 3 days prior to being seen.  The patient did have COVID-19 about 3 weeks prior to presentation.  He also reports his son came back college December.  This son had Haily-Barr.  Blood test on 02/16 showed elevated AST of 122, ALT of 346 and CRP 1078.  CT of the abdomen showed hepatosplenomegaly .  An ultrasound done on 02/20 showed mild increased hepatic echogenicity suggestive of steatosis and mild splenomegaly.  He would 2 small hepatic cyst.  He had a sub cm right simple cyst in the kidney.  The patient underwent an EGD by Dr. Vance Cronin on 02/29/2024.  Repeat LFTs showed an ALT is still slightly elevated at 50.  He also had a CMP and EBV titers done.  Both positive for IgG but not IgM.    ROS:  Patient reports feeling well.  He did have an episode of a transaminitis proximally 1 year ago in in May.  No source was identified either.    Physical exam:   General appearance:  No acute distress   HEENT: Trachea is midline without JVD   Pulmonary:  Good inspiratory, expiratory breath sounds are heard.  Lungs are clear auscultation.    Cardiovascular:  S1-S2, rhythm is regular.  Extremities without edema.    GI:  Abdomen is nontender, nondistended without hepatosplenomegaly    Assessment:  1. Transaminitis   2. Gout     Plan:  1. Will repeat a LFT today as well as an H pylori antibody test  2.  The patient will follow up pending test results.

## 2024-03-13 LAB — H PYLORI IGG SERPL QL IA: NEGATIVE

## 2024-03-14 ENCOUNTER — PATIENT MESSAGE (OUTPATIENT)
Dept: INTERNAL MEDICINE | Facility: CLINIC | Age: 49
End: 2024-03-14
Payer: COMMERCIAL

## 2024-03-15 DIAGNOSIS — R74.01 ELEVATED TRANSAMINASE LEVEL: Primary | ICD-10-CM

## 2024-03-27 ENCOUNTER — PATIENT MESSAGE (OUTPATIENT)
Dept: INTERNAL MEDICINE | Facility: CLINIC | Age: 49
End: 2024-03-27
Payer: COMMERCIAL

## 2024-03-31 DIAGNOSIS — M10.9 GOUT, ARTHRITIS: ICD-10-CM

## 2024-04-01 RX ORDER — ALLOPURINOL 300 MG/1
300 TABLET ORAL
Qty: 90 TABLET | Refills: 0 | Status: SHIPPED | OUTPATIENT
Start: 2024-04-01

## 2024-04-03 ENCOUNTER — HOSPITAL ENCOUNTER (OUTPATIENT)
Dept: RADIOLOGY | Facility: HOSPITAL | Age: 49
Discharge: HOME | End: 2024-04-03
Attending: PHYSICAL MEDICINE & REHABILITATION
Payer: COMMERCIAL

## 2024-04-03 ENCOUNTER — OFFICE VISIT (OUTPATIENT)
Dept: PHYSICAL MEDICINE AND REHAB | Facility: CLINIC | Age: 49
End: 2024-04-03
Payer: COMMERCIAL

## 2024-04-03 VITALS — HEIGHT: 70 IN | BODY MASS INDEX: 25.8 KG/M2 | WEIGHT: 180.25 LBS | OXYGEN SATURATION: 98 %

## 2024-04-03 DIAGNOSIS — M54.42 CHRONIC BILATERAL LOW BACK PAIN WITH BILATERAL SCIATICA: ICD-10-CM

## 2024-04-03 DIAGNOSIS — M54.41 CHRONIC BILATERAL LOW BACK PAIN WITH BILATERAL SCIATICA: Primary | ICD-10-CM

## 2024-04-03 DIAGNOSIS — G89.29 CHRONIC BILATERAL LOW BACK PAIN WITH BILATERAL SCIATICA: ICD-10-CM

## 2024-04-03 DIAGNOSIS — M54.42 CHRONIC BILATERAL LOW BACK PAIN WITH BILATERAL SCIATICA: Primary | ICD-10-CM

## 2024-04-03 DIAGNOSIS — G89.29 CHRONIC BILATERAL LOW BACK PAIN WITH BILATERAL SCIATICA: Primary | ICD-10-CM

## 2024-04-03 DIAGNOSIS — M54.41 CHRONIC BILATERAL LOW BACK PAIN WITH BILATERAL SCIATICA: ICD-10-CM

## 2024-04-03 PROCEDURE — 99204 OFFICE O/P NEW MOD 45 MIN: CPT | Mod: S$GLB,,, | Performed by: PHYSICAL MEDICINE & REHABILITATION

## 2024-04-03 PROCEDURE — 1159F MED LIST DOCD IN RCRD: CPT | Mod: CPTII,S$GLB,, | Performed by: PHYSICAL MEDICINE & REHABILITATION

## 2024-04-03 PROCEDURE — 99999 PR PBB SHADOW E&M-EST. PATIENT-LVL III: CPT | Mod: PBBFAC,,, | Performed by: PHYSICAL MEDICINE & REHABILITATION

## 2024-04-03 PROCEDURE — 3008F BODY MASS INDEX DOCD: CPT | Mod: CPTII,S$GLB,, | Performed by: PHYSICAL MEDICINE & REHABILITATION

## 2024-04-03 PROCEDURE — 72110 X-RAY EXAM L-2 SPINE 4/>VWS: CPT | Mod: 26,,, | Performed by: RADIOLOGY

## 2024-04-03 PROCEDURE — 72110 X-RAY EXAM L-2 SPINE 4/>VWS: CPT | Mod: TC

## 2024-04-03 RX ORDER — GABAPENTIN 300 MG/1
300 CAPSULE ORAL NIGHTLY
Qty: 60 CAPSULE | Refills: 3 | Status: SHIPPED | OUTPATIENT
Start: 2024-04-03 | End: 2024-05-03

## 2024-04-03 RX ORDER — ACETAMINOPHEN 500 MG
500-1000 TABLET ORAL 3 TIMES DAILY PRN
Refills: 0 | COMMUNITY
Start: 2024-04-03

## 2024-04-03 RX ORDER — MELOXICAM 7.5 MG/1
7.5 TABLET ORAL DAILY
Qty: 30 TABLET | Refills: 2 | Status: SHIPPED | OUTPATIENT
Start: 2024-04-03

## 2024-04-03 NOTE — PROGRESS NOTES
Subjective:       Patient ID: Jarod Arias Sr. is a 48 y.o. male.    Chief Complaint: No chief complaint on file.      HPI    Mr. Amaya is a 48-year-old male past medical history of gouty arthritis.  He is presenting to the [facility] for chronic low back pain.    The patient reports he started complaining of low back pain in 08/2021.    He had intermittent right-sided sciatica. He denies any preceding injuries.  However, he has a  and he worked for long hours on a computer.  He acquired a stand-up desk.  He also had a course of physical therapy including dry needling.  His pain significantly improved.  It triggered later in 05/2023 after lifting heavy boxes while helping his son move from college.  He also had right-sided sciatica.  He did a home exercise program and subsequently had a course of physical therapy from 05/2023 to 8/2023.  His pain significantly improve but recurred in mid/2023.  He denies any preceding injuries.  He had a short course of physical therapy outside [facility] with no significant relief.  He recalls being told by the therapist that he may need epidural steroid injections.      Currently, his low back pain is an intermittent soreness in the lower lumbar spine and across his lower spine to both buttock regions.  Has occasional shooting pain to the right calf.  He also started recently having shooting pain to the left heel with sensations of his foot going to sleep.  His pain is aggravated by prolonged sitting, getting up from a seated position, heavy lifting and squatting.  It is better with local heat and a short walk.  His maximum pain is 8-9/10 and minimum 1-2/10.  Today it is 4-5/10.  He denies any lower extremity weakness.  He denies any bowel or bladder incontinence.  He denies any saddle anesthesia.  Denies any leg claudications although he reports 1 episode his leg muscles cramping up after walking for about MI.    He is currently  taking:  Over-the-counter ibuprofen but only 1 dose few days ago.  He indicates he generally does not like taking medications.  He denies any GI, renal or cardiac problems.  He is interested in going back to physical therapy through [facility].        Past Medical History:   Diagnosis Date    Allergy     Elevated blood pressure reading without diagnosis of hypertension     Gout     Left wrist fracture         Review of patient's allergies indicates:   Allergen Reactions    No known drug allergies         Review of Systems   Constitutional:  Negative for chills and fever.   Eyes:  Negative for visual disturbance.   Respiratory:  Negative for shortness of breath.    Cardiovascular:  Negative for chest pain.   Gastrointestinal:  Positive for nausea. Negative for blood in stool, constipation and vomiting.   Genitourinary:  Negative for difficulty urinating.   Musculoskeletal:  Positive for back pain. Negative for arthralgias, gait problem and neck pain.   Neurological:  Negative for dizziness, weakness and headaches.   Psychiatric/Behavioral:  Negative for behavioral problems and sleep disturbance.            Objective:      Physical Exam  Vitals reviewed.   Constitutional:       General: He is not in acute distress.     Appearance: He is well-developed.   HENT:      Head: Normocephalic and atraumatic.   Musculoskeletal:      Cervical back: Normal range of motion.      Comments: BUE:  ROM:   RUE: full.   LUE: full.  Strength:    RUE: 5/5 at shoulder abduction, 5 elbow flexion, 5 elbow extension, 5 hand .   LUE: 5/5 at shoulder abduction, 5 elbow flexion, 5 elbow extension, 5 hand .  Sensation to pinprick:   RUE: intact.   LUE: intact.  DTR:    RUE: +2 biceps, +2 triceps.   LUE:  +2 biceps, +2 triceps.      BLE:  ROM:   RLE: full.   LLE: full.  Knee crepitus:   RLE: -ve.   LLE: -ve.   Strength:    RLE: 5/5 at hip flexion, 5 knee extension, 5 ankle DF, 5 PF, 5 EHL.   LLE: 5/5 at hip flexion, 5 knee extension, 5 ankle  DF, 5 PF, 5 EHL.  Sensation to pinprick:     RLE: intact.      LLE: intact.   DTR:     RLE: +2 knee, +1 ankle.    LLE: +2 knee, +1 ankle.  Clonus:    Rt ankle: -ve.    Lt ankle: -ve.  SLR:      RLE: +ve at 30 degrees.      LLE: +ve at 50 degrees.   ERWIN:     RLE: -ve.      LLE: -ve.  Gilet's test:     RLE: -ve.      LLE: -ve.  +ve tenderness over lumbar spine.  +ve tenderness SI joints.  No leg length discrepancy.    Directional Preference:  Spine flexion: 45 degrees , mod pain in back.  Spine extension: 20 degrees, no pain in back.  Lateral bending: no pain to Right, no pain to Left.      Heel walking: WNL.  Toe walking: WNL.  Gait: WNL     Skin:     General: Skin is warm.   Neurological:      Mental Status: He is alert.   Psychiatric:         Behavior: Behavior normal.       Assessment:       1. Chronic bilateral low back pain with bilateral sciatica        Plan:       - X-Ray Lumbar Spine Ap Lateral w/Flex Ext; Future  - Start meloxicam (MOBIC) 7.5 MG tablet; Take 1 tablet (7.5 mg total) by mouth once daily. In 1 week, if no relief, may increase dose to two tablets once daily.  - Start gabapentin (NEURONTIN) 300 MG capsule; Take 1 capsule (300 mg total) by mouth every evening. In 1 wk, if no relief, increase to twice daily.In another wk, may increase to 3 times.  - Start acetaminophen (TYLENOL) 500 MG tablet; Take 1-2 tablets (500-1,000 mg total) by mouth 3 (three) times daily as needed for Pain.  - Ambulatory referral/consult to Physical Therapy (at [facility])  - He was explained that if symptoms do not improve with conservative treatment, we can MRI side on referral for epidural steroid injections.  - Follow up in about 3 months (around 7/3/2024).    This was a 50 minute visit, 50% of which was spent educating the patient about the diagnosis and the treatment plan.    This note was partly generated with [employer] voice recognition software. I apologize for any possible typographical errors.

## 2024-04-08 ENCOUNTER — PATIENT MESSAGE (OUTPATIENT)
Dept: PHYSICAL MEDICINE AND REHAB | Facility: CLINIC | Age: 49
End: 2024-04-08
Payer: COMMERCIAL

## 2024-04-08 DIAGNOSIS — G89.29 CHRONIC BILATERAL LOW BACK PAIN WITH BILATERAL SCIATICA: Primary | ICD-10-CM

## 2024-04-08 DIAGNOSIS — M51.36 LUMBAR ADJACENT SEGMENT DISEASE WITH SPONDYLOLISTHESIS: ICD-10-CM

## 2024-04-08 DIAGNOSIS — M54.42 CHRONIC BILATERAL LOW BACK PAIN WITH BILATERAL SCIATICA: Primary | ICD-10-CM

## 2024-04-08 DIAGNOSIS — M51.36 DDD (DEGENERATIVE DISC DISEASE), LUMBAR: ICD-10-CM

## 2024-04-08 DIAGNOSIS — M54.41 CHRONIC BILATERAL LOW BACK PAIN WITH BILATERAL SCIATICA: Primary | ICD-10-CM

## 2024-04-08 DIAGNOSIS — M43.16 LUMBAR ADJACENT SEGMENT DISEASE WITH SPONDYLOLISTHESIS: ICD-10-CM

## 2024-04-17 ENCOUNTER — CLINICAL SUPPORT (OUTPATIENT)
Dept: REHABILITATION | Facility: HOSPITAL | Age: 49
End: 2024-04-17
Attending: PHYSICAL MEDICINE & REHABILITATION
Payer: COMMERCIAL

## 2024-04-17 DIAGNOSIS — M54.42 CHRONIC BILATERAL LOW BACK PAIN WITH BILATERAL SCIATICA: ICD-10-CM

## 2024-04-17 DIAGNOSIS — M54.41 ACUTE RIGHT-SIDED LOW BACK PAIN WITH RIGHT-SIDED SCIATICA: Primary | ICD-10-CM

## 2024-04-17 DIAGNOSIS — M54.41 CHRONIC BILATERAL LOW BACK PAIN WITH BILATERAL SCIATICA: ICD-10-CM

## 2024-04-17 DIAGNOSIS — G89.29 CHRONIC BILATERAL LOW BACK PAIN WITH BILATERAL SCIATICA: ICD-10-CM

## 2024-04-17 DIAGNOSIS — M53.86 DECREASED ROM OF LUMBAR SPINE: ICD-10-CM

## 2024-04-17 PROCEDURE — 97161 PT EVAL LOW COMPLEX 20 MIN: CPT

## 2024-04-17 PROCEDURE — 97110 THERAPEUTIC EXERCISES: CPT

## 2024-04-17 NOTE — PLAN OF CARE
[facility]   Physical Therapy Initial Evaluation      Date: 4/17/2024   Name: Jarod Arias .  Clinic Number: [id number]    Therapy Diagnosis:   Encounter Diagnoses   Name Primary?    Chronic bilateral low back pain with bilateral sciatica     Acute right-sided low back pain with right-sided sciatica Yes    Decreased ROM of lumbar spine      Physician: Luigi Tristan MD    Physician Orders: PT Eval and Treat   Medical Diagnosis from Referral:   M54.42 (ICD-10-CM) - Lumbago with sciatica, left side   M54.41 (ICD-10-CM) - Lumbago with sciatica, right side   G89.29 (ICD-10-CM) - Other chronic pain   Evaluation Date: 4/17/2024  Authorization Period Expiration: TBD  Plan of Care Expiration: 6/17/2024  Progress Note Due: 5/17/2024  Visit # / Visits authorized: 1/1   FOTO: 1/5    FOTO Initial Evaluation: 53  FOTO 2nd F/U: NA  FOTO Discharge: NA    Precautions: Standard     Time In: 1045  Time Out: 1130  Total Appointment Time (timed & untimed codes): 45 minutes      SUBJECTIVE     Date of onset: 1 month ago    History of current condition - Jarod reports: he has been having on and off back pain for years without a mechanism of injury. Patient reports he has had therapy in the past for this and has resolved. Patient reports he was going to a physical therapist 1x a week out of network. Patient reports they were doing manual therapy and other exercises, but has not resolved. Patient reports his symptoms are worse with sitting. Patient reports he has radicular pain in the R leg that goes down to the calf. Patient reports he is having L foot symptoms that may be related and may feel like numbness in the L heel. Patient reports his symptoms are worse in the morning and resolves the longer he goes. Patient feels better with walking.         Imaging, bone scan films: Mild levoconvex curvature lumbar spine. Disc space narrowing L3-4 and L5-S1. Few mm retrolisthesis L5 on S1. No significant  motion with flexion extension. Facet arthropathy lumbosacral level.     Prior Therapy: Yes  Occupation:  - stand up desk  Prior Level of Function: Independent with ADLs, gardening  Current Level of Function: Not able to lift heavy packs of water.     Pain:  Current 4/10, worst 5/10, best 2/10   Location: Mainly R low back and R glute     Description: Tight, Sharp, and Shooting  Aggravating Factors: sitting   Easing Factors: medication, stretching     Patients goals: To reduce back pain      Medical History:   Past Medical History:   Diagnosis Date    Allergy     Elevated blood pressure reading without diagnosis of hypertension     Gout     Left wrist fracture        Surgical History:   Jarod Apple [place]ana Sr.  has a past surgical history that includes Upperco tooth extraction and Pilonidal cyst drainage.    Medications:   Jarod has a current medication list which includes the following prescription(s): acetaminophen, allopurinol, alprazolam, cetirizine, diphenhydramine, fluticasone propionate, gabapentin, meloxicam, and pseudoephedrine.    Allergies:   Review of patient's allergies indicates:   Allergen Reactions    No known drug allergies           OBJECTIVE     Lumbar AROM: Pain/Dysfunction with Movement:   Flexion: 35 degrees Increased R sided back and hip pain    Extension: 20 degrees Twinge in R buttock, but better than flexion   Right side bendin degrees Calf twinge    Left side bendin degrees    Right rotation: WNL degrees    Left rotation: WNL degrees    Extension + R Rotation: WNL degree    Extension + L Rotation: WNL degree      Myotome / MMT Right Left Pain/Dysfunction with Movement   L1,2- Hip Flexion (Femoral Nerve) 4+/5 5/5    L3,4- Knee Extension (Femoral Nerve) 4+/5 ! 5/5 Pain R low back and buttock    L4,5- Ankle Dorsiflexion (Deep Fibular Nerve) 5/5 5/5    L5, S1- Ankle plantarflexion (Tibial Nerve) 5/5 5/5    Hip Abduction 4/5 4+/5    Knee Flexion 4+/5 5/5          Posture:    Repeated Motions Positive/Negative   Flexion Worsened    Extension Improves     Neural Tension Positive/Negative   Passive SLR w/ DF  + on R              Palpation:  - Tenderness to R lower back / paraspinals       Lumbar Manual Distraction:        FOTO Lumbar Survey    Therapist reviewed FOTO scores for Jarod Cruzac Sr. on 4/17/2024.   FOTO documents entered into [employer] - see Media section.    Intake Score: 53         TREATMENT     Total Treatment time (time-based codes) separate from Evaluation: 15 minutes      Jarod received the treatments listed below:      therapeutic exercises to develop strength, endurance, ROM, and flexibility for 15 minutes including:    Access Code: [email or web address]    Exercises  - Static Prone on Elbows  - 2 x daily - 7 x weekly - 2 sets - 3 minutes hold  - Prone Press Up  - 2 x daily - 7 x weekly - 2 sets - 10 reps - 3 seconds hold  - Seated Piriformis Stretch  - 2 x daily - 7 x weekly - 3 sets - 30 seconds hold  - Supine Bridge  - 2 x daily - 7 x weekly - 3 sets - 10 reps - 1 seconds hold  - Clamshell  - 2 x daily - 7 x weekly - 3 sets - 10 reps - 1 seconds hold  - Standing Lumbar Extension with Counter  - 2 x daily - 7 x weekly - 2 sets - 10 reps - 3 seconds hold  - Supine 90/90 Sciatic Nerve Glide with Knee Flexion/Extension  - 2 x daily - 7 x weekly - 2 sets - 10 reps - 2 seconds hold  - Seated Slump Nerve Glide  - 2 x daily - 7 x weekly - 2 sets - 10 reps - 2 seconds hold              PATIENT EDUCATION AND HOME EXERCISES     Education provided:   - Purpose of PT  - HEP    Written Home Exercises Provided: yes. Exercises were reviewed and Jarod was able to demonstrate them prior to the end of the session.  Jarod demonstrated good  understanding of the education provided. See EMR under Patient Instructions for exercises provided during therapy sessions.    ASSESSMENT     Jarod is a 48 y.o. male referred to outpatient Physical Therapy with a medical diagnosis of   M54.42  (ICD-10-CM) - Lumbago with sciatica, left side   M54.41 (ICD-10-CM) - Lumbago with sciatica, right side   G89.29 (ICD-10-CM) - Other chronic pain     Patient presents with a month history of lower back pain with radicular symptoms to the R lower leg. Patient has had a history of this issue that has been off and on, but has resolved with therapy. Patient presents with limited lumbar range of motion and R lower ex strength. Patients signs and symptoms present similarly to a lumbar disc pathology. Patient was progressed with therex today to address problem list.    Patient prognosis is Good.   Patient will benefit from skilled outpatient Physical Therapy to address the deficits stated above and in the chart below, provide patient /family education, and to maximize patientt's level of independence.     Plan of care discussed with patient: Yes  Patient's spiritual, cultural and educational needs considered and patient is agreeable to the plan of care and goals as stated below:     Anticipated Barriers for therapy: None    Medical Necessity is demonstrated by the following  History  Co-morbidities and personal factors that may impact the plan of care Co-morbidities:   None    Personal Factors:   no deficits     low   Examination  Body Structures and Functions, activity limitations and participation restrictions that may impact the plan of care Body Regions:   back  lower extremities    Body Systems:    ROM  strength    Participation Restrictions:   None    Activity limitations:   Learning and applying knowledge  no deficits    General Tasks and Commands  no deficits    Communication  no deficits    Mobility  no deficits    Self care  no deficits    Domestic Life  no deficits    Interactions/Relationships  no deficits    Life Areas  no deficits    Community and Social Life  recreation and leisure         high   Clinical Presentation stable and uncomplicated low   Decision Making/ Complexity Score: low     Goals:  Short  Term Goals (4 Weeks):  1. Pt will be compliant with HEP to supplement PT in decreasing pain with functional mobility.  2. Pt will perform pallof press with good control to demonstrate improved core strength.  3. Pt will improve lumbar ROM by 5-10 degrees in all planes to improve functional mobility.  4. Pt will improve impaired LE MMTs by 1/2 score to improve strength for functional tasks.  Long Term Goals (8 Weeks):   1. Pt will improve FOTO score to >/= 65 to decrease perceived limitation with maintaining/changing body position.   2. Pt will perform squat with good form to reduce risk of re injury with lifting.  3. Pt will improve impaired LE MMTs by 1 score to improve strength for functional tasks.  4. Pt will report no pain during lumbar ROM to promote functional mobility.       PLAN   Plan of care Certification: 4/17/2024 to 6/17/2024.    Outpatient Physical Therapy 1-2 times weekly for 8 weeks to include the following interventions: Cervical/Lumbar Traction, Manual Therapy, Moist Heat/ Ice, Neuromuscular Re-ed, Patient Education, Self Care, Therapeutic Activities, and FDN.     Eduar Marion, PT      I CERTIFY THE NEED FOR THESE SERVICES FURNISHED UNDER THIS PLAN OF TREATMENT AND WHILE UNDER MY CARE   Physician's comments:     Physician's Signature: ___________________________________________________

## 2024-04-22 ENCOUNTER — CLINICAL SUPPORT (OUTPATIENT)
Dept: REHABILITATION | Facility: HOSPITAL | Age: 49
End: 2024-04-22
Payer: COMMERCIAL

## 2024-04-22 DIAGNOSIS — M53.86 DECREASED ROM OF LUMBAR SPINE: ICD-10-CM

## 2024-04-22 DIAGNOSIS — M54.41 ACUTE RIGHT-SIDED LOW BACK PAIN WITH RIGHT-SIDED SCIATICA: Primary | ICD-10-CM

## 2024-04-22 PROCEDURE — 97140 MANUAL THERAPY 1/> REGIONS: CPT

## 2024-04-22 PROCEDURE — 97110 THERAPEUTIC EXERCISES: CPT

## 2024-04-22 PROCEDURE — 97014 ELECTRIC STIMULATION THERAPY: CPT

## 2024-04-22 NOTE — PROGRESS NOTES
[facility]   Physical Therapy Treatment Note      Name: Jarod Cruzac Sr.  Clinic Number: [id number]    Therapy Diagnosis:   Encounter Diagnoses   Name Primary?    Acute right-sided low back pain with right-sided sciatica Yes    Decreased ROM of lumbar spine      Physician: Luigi Tristan MD    Visit Date: 2024    Physician Orders: PT Eval and Treat   Medical Diagnosis from Referral:   M54.42 (ICD-10-CM) - Lumbago with sciatica, left side   M54.41 (ICD-10-CM) - Lumbago with sciatica, right side   G89.29 (ICD-10-CM) - Other chronic pain   Evaluation Date: 2024  Authorization Period Expiration: TBD  Plan of Care Expiration: 2024  Progress Note Due: 2024  Visit # / Visits authorized:  ,   FOTO: 2     FOTO Initial Evaluation: 53  FOTO 2nd F/U: NA  FOTO Discharge: NA     Precautions: Standard      Time In: 745  Time Out: 845  Total Appointment Time (timed & untimed codes): 60 minutes    PTA Visit #: 0/       Subjective     Patient reports: he has been doing his exercises, but lately he has been having R buttock pain after his exercises. Patient reports usually he feels better after stretching overall.  He was compliant with home exercise program.  Response to previous treatment: No change   Functional change: None    Pain: 3/10  Location: right back  and buttocks      Objective      Initial Evaluation       Lumbar AROM: Pain/Dysfunction with Movement:   Flexion: 35 degrees Increased R sided back and hip pain    Extension: 20 degrees Twinge in R buttock, but better than flexion   Right side bendin degrees Calf twinge    Left side bendin degrees     Right rotation: WNL degrees     Left rotation: WNL degrees     Extension + R Rotation: WNL degree     Extension + L Rotation: WNL degree        Myotome / MMT Right Left Pain/Dysfunction with Movement   L1,2- Hip Flexion (Femoral Nerve) 4+/5 5/5     L3,4- Knee Extension (Femoral Nerve) 4+/5 ! 5/5  Pain R low back and buttock    L4,5- Ankle Dorsiflexion (Deep Fibular Nerve) 5/5 5/5     L5, S1- Ankle plantarflexion (Tibial Nerve) 5/5 5/5     Hip Abduction 4/5 4+/5     Knee Flexion 4+/5 5/5              Repeated Motions Positive/Negative   Flexion Worsened    Extension Improves      Neural Tension Positive/Negative   Passive SLR w/ DF  + on R                   Palpation:  - Tenderness to R lower back / paraspinals         Lumbar Manual Distraction:    Treatment     Jarod received the treatments listed below:      therapeutic exercises to develop strength, endurance, ROM, and flexibility for 25 minutes including:    Bridging 3x10  Clams GTB 3x10  LTR x15 for 3s   Prone hip extensions 3#   - 2x10         HEP Review  Access Code: [email or web address]  Exercises  - Static Prone on Elbows  - 2 x daily - 7 x weekly - 2 sets - 3 minutes hold  - Prone Press Up  - 2 x daily - 7 x weekly - 2 sets - 10 reps - 3 seconds hold  - Seated Piriformis Stretch  - 2 x daily - 7 x weekly - 3 sets - 30 seconds hold  - Supine Bridge  - 2 x daily - 7 x weekly - 3 sets - 10 reps - 1 seconds hold  - Clamshell  - 2 x daily - 7 x weekly - 3 sets - 10 reps - 1 seconds hold  - Standing Lumbar Extension with Counter  - 2 x daily - 7 x weekly - 2 sets - 10 reps - 3 seconds hold  - Supine 90/90 Sciatic Nerve Glide with Knee Flexion/Extension  - 2 x daily - 7 x weekly - 2 sets - 10 reps - 2 seconds hold  - Seated Slump Nerve Glide  - 2 x daily - 7 x weekly - 2 sets - 10 reps - 2 seconds hold    manual therapy techniques: were applied to the: Lumbar spine or R gluteal region for 35 minutes, including:    Palpation performed to determine muscular tightness and trigger points.     Functional Dry Needling to R gluteal region / piriformis with 4 75mm needles with mechanical winding for manual stimulation and estim for electrical stimulation. Patient had great muscular contractions from estim today.     Cupping to R gluteal region     neuromuscular re-education  activities to improve: Coordination, Proprioception, Posture, and Motor Control for 0 minutes. The following activities were included:        therapeutic activities to improve functional performance for 0  minutes, including:        Patient Education and Home Exercises       Education provided:   - HEP    Written Home Exercises Provided: Patient instructed to cont prior HEP. Exercises were reviewed and Jarod was able to demonstrate them prior to the end of the session.  Jarod demonstrated good  understanding of the education provided. See Electronic Medical Record under Patient Instructions for exercises provided during therapy sessions    Assessment     Patient presents to PT today with continued R lower back and buttock pain. Patient was progressed with manual therapy today to address muscular tightness. Patient was re educated on HEP and performed a few of them to see if they were less painful after manual therapy. Bridging was less painful after manual therapy today. Cont to progress as tolerated.     Jarod Is progressing well towards his goals.   Patient prognosis is Good.     Patient will continue to benefit from skilled outpatient physical therapy to address the deficits listed in the problem list box on initial evaluation, provide pt/family education and to maximize pt's level of independence in the home and community environment.     Patient's spiritual, cultural and educational needs considered and pt agreeable to plan of care and goals.     Anticipated barriers to physical therapy: None    Goals:   Short Term Goals (4 Weeks):  1. Pt will be compliant with HEP to supplement PT in decreasing pain with functional mobility.  2. Pt will perform pallof press with good control to demonstrate improved core strength.  3. Pt will improve lumbar ROM by 5-10 degrees in all planes to improve functional mobility.  4. Pt will improve impaired LE MMTs by 1/2 score to improve strength for functional tasks.  Long Term  Goals (8 Weeks):   1. Pt will improve FOTO score to >/= 65 to decrease perceived limitation with maintaining/changing body position.   2. Pt will perform squat with good form to reduce risk of re injury with lifting.  3. Pt will improve impaired LE MMTs by 1 score to improve strength for functional tasks.  4. Pt will report no pain during lumbar ROM to promote functional mobility.         PLAN   Plan of care Certification: 4/17/2024 to 6/17/2024.     Outpatient Physical Therapy 1-2 times weekly for 8 weeks to include the following interventions: Cervical/Lumbar Traction, Manual Therapy, Moist Heat/ Ice, Neuromuscular Re-ed, Patient Education, Self Care, Therapeutic Activities, and FDN.        Eduar Marion, PT

## 2024-04-24 ENCOUNTER — CLINICAL SUPPORT (OUTPATIENT)
Dept: REHABILITATION | Facility: HOSPITAL | Age: 49
End: 2024-04-24
Payer: COMMERCIAL

## 2024-04-24 DIAGNOSIS — M53.86 DECREASED ROM OF LUMBAR SPINE: ICD-10-CM

## 2024-04-24 DIAGNOSIS — M54.41 ACUTE RIGHT-SIDED LOW BACK PAIN WITH RIGHT-SIDED SCIATICA: Primary | ICD-10-CM

## 2024-04-24 PROCEDURE — 97140 MANUAL THERAPY 1/> REGIONS: CPT

## 2024-04-24 PROCEDURE — 97110 THERAPEUTIC EXERCISES: CPT

## 2024-04-24 NOTE — PROGRESS NOTES
[facility]   Physical Therapy Treatment Note      Name: Jarod Cruzac Sr.  Clinic Number: [id number]    Therapy Diagnosis:   Encounter Diagnoses   Name Primary?    Acute right-sided low back pain with right-sided sciatica Yes    Decreased ROM of lumbar spine        Physician: Luigi Tristan MD    Visit Date: 2024    Physician Orders: PT Eval and Treat   Medical Diagnosis from Referral:   M54.42 (ICD-10-CM) - Lumbago with sciatica, left side   M54.41 (ICD-10-CM) - Lumbago with sciatica, right side   G89.29 (ICD-10-CM) - Other chronic pain   Evaluation Date: 2024  Authorization Period Expiration: 2024  Plan of Care Expiration: 2024  Progress Note Due: 2024  Visit # / Visits authorized:  ,   FOTO: 3/5     FOTO Initial Evaluation: 53  FOTO 2nd F/U: NA  FOTO Discharge: NA     Precautions: Standard      Time In: 830  Time Out: 920  Total Appointment Time (timed & untimed codes): 50 minutes    PTA Visit #: 0/5       Subjective     Patient reports: he is doing ok today, but still tightness in the R gluteal region. Patient felt good after the needling and cupping last session.         He was compliant with home exercise program.  Response to previous treatment: No change   Functional change: None    Pain: 3/10  Location: right back  and buttocks      Objective      Initial Evaluation       Lumbar AROM: Pain/Dysfunction with Movement:   Flexion: 35 degrees Increased R sided back and hip pain    Extension: 20 degrees Twinge in R buttock, but better than flexion   Right side bendin degrees Calf twinge    Left side bendin degrees     Right rotation: WNL degrees     Left rotation: WNL degrees     Extension + R Rotation: WNL degree     Extension + L Rotation: WNL degree        Myotome / MMT Right Left Pain/Dysfunction with Movement   L1,2- Hip Flexion (Femoral Nerve) 4+/5 5/5     L3,4- Knee Extension (Femoral Nerve) 4+/5 ! 5/5 Pain R low back and  buttock    L4,5- Ankle Dorsiflexion (Deep Fibular Nerve) 5/5 5/5     L5, S1- Ankle plantarflexion (Tibial Nerve) 5/5 5/5     Hip Abduction 4/5 4+/5     Knee Flexion 4+/5 5/5              Repeated Motions Positive/Negative   Flexion Worsened    Extension Improves      Neural Tension Positive/Negative   Passive SLR w/ DF  + on R                   Palpation:  - Tenderness to R lower back / paraspinals         Lumbar Manual Distraction:    Treatment     Jarod received the treatments listed below:      therapeutic exercises to develop strength, endurance, ROM, and flexibility for 40 minutes including:    Gastroc stretch 30s x 4  Supine piriformis stretch 30s x 4 (on heat)   Supine hamstring stretch 30s x 4 (on heat)   Supine Nerve Glides 2x10  (on heat)  LTR x15 for 3s (on heat)   SKTC 2x10 (on heat)   Bridging 3x10 (on heat)         Clams GTB 3x10  Prone hip extensions 3#   - 2x10         HEP Review  Access Code: [email or web address]  Exercises  - Static Prone on Elbows  - 2 x daily - 7 x weekly - 2 sets - 3 minutes hold  - Prone Press Up  - 2 x daily - 7 x weekly - 2 sets - 10 reps - 3 seconds hold  - Seated Piriformis Stretch  - 2 x daily - 7 x weekly - 3 sets - 30 seconds hold  - Supine Bridge  - 2 x daily - 7 x weekly - 3 sets - 10 reps - 1 seconds hold  - Clamshell  - 2 x daily - 7 x weekly - 3 sets - 10 reps - 1 seconds hold  - Standing Lumbar Extension with Counter  - 2 x daily - 7 x weekly - 2 sets - 10 reps - 3 seconds hold  - Supine 90/90 Sciatic Nerve Glide with Knee Flexion/Extension  - 2 x daily - 7 x weekly - 2 sets - 10 reps - 2 seconds hold  - Seated Slump Nerve Glide  - 2 x daily - 7 x weekly - 2 sets - 10 reps - 2 seconds hold    manual therapy techniques: were applied to the: Lumbar spine or R gluteal region for 10 minutes, including:      Cupping to R gluteal region           Needling Not Performed Today   Palpation performed to determine muscular tightness and trigger points.     Functional Dry Needling to R  gluteal region / piriformis with 4 75mm needles with mechanical winding for manual stimulation and estim for electrical stimulation. Patient had great muscular contractions from estim today.           neuromuscular re-education activities to improve: Coordination, Proprioception, Posture, and Motor Control for 0 minutes. The following activities were included:        therapeutic activities to improve functional performance for 0  minutes, including:        Patient Education and Home Exercises       Education provided:   - HEP    Written Home Exercises Provided: Patient instructed to cont prior HEP. Exercises were reviewed and Jarod was able to demonstrate them prior to the end of the session.  Jarod demonstrated good  understanding of the education provided. See Electronic Medical Record under Patient Instructions for exercises provided during therapy sessions    Assessment     Patient presents to PT today with continued R lower back and buttock pain. Patient was progressed with therex and continued manual therapy today to address muscular tightness. Bridging was less painful and weak. Cont to progress as tolerated.     Jarod Is progressing well towards his goals.   Patient prognosis is Good.     Patient will continue to benefit from skilled outpatient physical therapy to address the deficits listed in the problem list box on initial evaluation, provide pt/family education and to maximize pt's level of independence in the home and community environment.     Patient's spiritual, cultural and educational needs considered and pt agreeable to plan of care and goals.     Anticipated barriers to physical therapy: None    Goals:   Short Term Goals (4 Weeks):  1. Pt will be compliant with HEP to supplement PT in decreasing pain with functional mobility.  2. Pt will perform pallof press with good control to demonstrate improved core strength.  3. Pt will improve lumbar ROM by 5-10 degrees in all planes to improve functional  mobility.  4. Pt will improve impaired LE MMTs by 1/2 score to improve strength for functional tasks.  Long Term Goals (8 Weeks):   1. Pt will improve FOTO score to >/= 65 to decrease perceived limitation with maintaining/changing body position.   2. Pt will perform squat with good form to reduce risk of re injury with lifting.  3. Pt will improve impaired LE MMTs by 1 score to improve strength for functional tasks.  4. Pt will report no pain during lumbar ROM to promote functional mobility.         PLAN   Plan of care Certification: 4/17/2024 to 6/17/2024.     Outpatient Physical Therapy 1-2 times weekly for 8 weeks to include the following interventions: Cervical/Lumbar Traction, Manual Therapy, Moist Heat/ Ice, Neuromuscular Re-ed, Patient Education, Self Care, Therapeutic Activities, and FDN.        Eduar Marion, PT

## 2024-04-25 ENCOUNTER — LAB VISIT (OUTPATIENT)
Dept: LAB | Facility: HOSPITAL | Age: 49
End: 2024-04-25
Attending: INTERNAL MEDICINE
Payer: COMMERCIAL

## 2024-04-25 ENCOUNTER — OFFICE VISIT (OUTPATIENT)
Dept: PRIMARY CARE CLINIC | Facility: CLINIC | Age: 49
End: 2024-04-25
Payer: COMMERCIAL

## 2024-04-25 VITALS
BODY MASS INDEX: 25.38 KG/M2 | SYSTOLIC BLOOD PRESSURE: 124 MMHG | TEMPERATURE: 98 F | HEIGHT: 70 IN | OXYGEN SATURATION: 97 % | RESPIRATION RATE: 18 BRPM | WEIGHT: 177.25 LBS | DIASTOLIC BLOOD PRESSURE: 82 MMHG | HEART RATE: 91 BPM

## 2024-04-25 DIAGNOSIS — R35.0 INCREASED URINARY FREQUENCY: ICD-10-CM

## 2024-04-25 DIAGNOSIS — R10.30 LOWER ABDOMINAL PAIN: Primary | ICD-10-CM

## 2024-04-25 DIAGNOSIS — R10.30 LOWER ABDOMINAL PAIN: ICD-10-CM

## 2024-04-25 LAB
ALBUMIN SERPL BCP-MCNC: 4.3 G/DL (ref 3.5–5.2)
ALP SERPL-CCNC: 56 U/L (ref 55–135)
ALT SERPL W/O P-5'-P-CCNC: 12 U/L (ref 10–44)
AST SERPL-CCNC: 15 U/L (ref 10–40)
BILIRUB DIRECT SERPL-MCNC: 0.3 MG/DL (ref 0.1–0.3)
BILIRUB SERPL-MCNC: 0.9 MG/DL (ref 0.1–1)
PROT SERPL-MCNC: 7.7 G/DL (ref 6–8.4)

## 2024-04-25 PROCEDURE — 3079F DIAST BP 80-89 MM HG: CPT | Mod: CPTII,S$GLB,, | Performed by: INTERNAL MEDICINE

## 2024-04-25 PROCEDURE — 99214 OFFICE O/P EST MOD 30 MIN: CPT | Mod: S$GLB,,, | Performed by: INTERNAL MEDICINE

## 2024-04-25 PROCEDURE — 1159F MED LIST DOCD IN RCRD: CPT | Mod: CPTII,S$GLB,, | Performed by: INTERNAL MEDICINE

## 2024-04-25 PROCEDURE — 3008F BODY MASS INDEX DOCD: CPT | Mod: CPTII,S$GLB,, | Performed by: INTERNAL MEDICINE

## 2024-04-25 PROCEDURE — 3074F SYST BP LT 130 MM HG: CPT | Mod: CPTII,S$GLB,, | Performed by: INTERNAL MEDICINE

## 2024-04-25 PROCEDURE — 99999 PR PBB SHADOW E&M-EST. PATIENT-LVL IV: CPT | Mod: PBBFAC,,, | Performed by: INTERNAL MEDICINE

## 2024-04-25 PROCEDURE — 80076 HEPATIC FUNCTION PANEL: CPT | Performed by: INTERNAL MEDICINE

## 2024-04-25 PROCEDURE — 1160F RVW MEDS BY RX/DR IN RCRD: CPT | Mod: CPTII,S$GLB,, | Performed by: INTERNAL MEDICINE

## 2024-04-25 PROCEDURE — 36415 COLL VENOUS BLD VENIPUNCTURE: CPT | Performed by: INTERNAL MEDICINE

## 2024-04-25 NOTE — PROGRESS NOTES
[facility] Note    Chief Complaint      Chief Complaint   Patient presents with    Abdominal Pain       History of Present Illness      Jarod Arias Sr. is a 48 y.o. male who presents today for   Chief Complaint   Patient presents with    Abdominal Pain   .  Patient comes to appointment here for acute visit related to above . He describes mid abdominal pain , increased urinary freq , fever . Has similar episode in 2/24 had gi workup felt likely viral .    HPI    No problem-specific Assessment & Plan notes found for this encounter.       Problem List Items Addressed This Visit          Renal/    Increased urinary frequency    Overview     Ua cx and sens recs to follow            GI    Lower abdominal pain - Primary    Overview     Repeat hepatic panel              Past Medical History:  Past Medical History:   Diagnosis Date    Allergy     Elevated blood pressure reading without diagnosis of hypertension     Gout     Left wrist fracture        Past Surgical History:  Past Surgical History:   Procedure Laterality Date    PILONIDAL CYST DRAINAGE      WISDOM TOOTH EXTRACTION         Family History:  family history includes Allergic rhinitis in his father; COPD in his mother; Cancer in his maternal grandfather and paternal grandfather; Cancer (age of onset: 65) in his maternal grandmother.     Social History:  Social History     Socioeconomic History    Marital status:    Occupational History     Employer: [employer]   Tobacco Use    Smoking status: Never    Smokeless tobacco: Former   Substance and Sexual Activity    Alcohol use: Yes     Alcohol/week: 2.0 standard drinks of alcohol     Types: 2 Drinks containing 0.5 oz of alcohol per week    Drug use: No    Sexual activity: Yes     Partners: Female     Birth control/protection: None     Social Determinants of Health     Financial Resource Strain: Low Risk  (3/5/2024)    Overall Financial Resource Strain (CARDIA)      Difficulty of Paying Living Expenses: Not hard at all   Food Insecurity: No Food Insecurity (3/5/2024)    Hunger Vital Sign     Worried About Running Out of Food in the Last Year: Never true     Ran Out of Food in the Last Year: Never true   Transportation Needs: No Transportation Needs (3/5/2024)    PRAPARE - Transportation     Lack of Transportation (Medical): No     Lack of Transportation (Non-Medical): No   Physical Activity: Inactive (3/5/2024)    Exercise Vital Sign     Days of Exercise per Week: 0 days     Minutes of Exercise per Session: 0 min   Stress: No Stress Concern Present (3/5/2024)    [employer] [employer] - Occupational Stress Questionnaire     Feeling of Stress : Only a little   Social Connections: Unknown (3/5/2024)    Social Connection and Isolation Panel [NHANES]     Frequency of Communication with Friends and Family: More than three times a week     Frequency of Social Gatherings with Friends and Family: Twice a week     Active Member of Clubs or Organizations: Yes     Attends Club or Organization Meetings: More than 4 times per year     Marital Status:    Housing Stability: Low Risk  (3/5/2024)    Housing Stability Vital Sign     Unable to Pay for Housing in the Last Year: No     Number of Places Lived in the Last Year: 1     Unstable Housing in the Last Year: No       Review of Systems:   Review of Systems   Constitutional:  Negative for fever and weight loss.   HENT:  Negative for congestion, hearing loss and sore throat.    Eyes:  Negative for blurred vision.   Respiratory:  Negative for cough and shortness of breath.    Cardiovascular:  Negative for chest pain, palpitations, claudication and leg swelling.   Gastrointestinal:  Positive for abdominal pain and nausea. Negative for constipation, diarrhea and heartburn.   Genitourinary:  Positive for frequency. Negative for dysuria.   Musculoskeletal:  Negative for back pain and myalgias.   Skin:  Negative for rash.  "  Neurological:  Negative for focal weakness and headaches.   Psychiatric/Behavioral:  Negative for depression and suicidal ideas. The patient is not nervous/anxious.         Medications:  Outpatient Encounter Medications as of 4/25/2024   Medication Sig Note Dispense Refill    acetaminophen (TYLENOL) 500 MG tablet Take 1-2 tablets (500-1,000 mg total) by mouth 3 (three) times daily as needed for Pain.   0    allopurinoL (ZYLOPRIM) 300 MG tablet TAKE 1 TABLET(300 MG) BY MOUTH EVERY DAY  90 tablet 0    ALPRAZolam (XANAX) 0.5 MG tablet Take 0.5 mg by mouth daily as needed. 3/12/2024: PRN      diphenhydrAMINE (BENADRYL) 25 mg capsule Take 25 mg by mouth every 6 (six) hours as needed for Itching. 3/12/2024: PRN      fluticasone propionate (FLONASE) 50 mcg/actuation nasal spray 1 spray by Each Nare route once daily.       gabapentin (NEURONTIN) 300 MG capsule Take 1 capsule (300 mg total) by mouth every evening. In 1 wk, if no relief, increase to twice daily.In another wk, may increase to 3 times.  60 capsule 3    meloxicam (MOBIC) 7.5 MG tablet Take 1 tablet (7.5 mg total) by mouth once daily. In 1 week, if no relief, may increase dose to two tablets once daily.  30 tablet 2    pseudoephedrine (SUDAFED) 30 MG tablet Take 30 mg by mouth every 4 (four) hours as needed for Congestion. 3/12/2024: PRN      cetirizine (ZYRTEC) 10 MG tablet Take 10 mg by mouth once daily.        No facility-administered encounter medications on file as of 4/25/2024.       Allergies:  Review of patient's allergies indicates:   Allergen Reactions    No known drug allergies          Physical Exam      Vitals:    04/25/24 1053   BP: 124/82   Pulse: 91   Resp: 18   Temp: 97.8 °F (36.6 °C)        Vital Signs  Temp: 97.8 °F (36.6 °C)  Temp Source: Oral  Pulse: 91  Resp: 18  SpO2: 97 %  BP: 124/82  BP Location: Right arm  Patient Position: Sitting  Pain Score: 0-No pain  Height and Weight  Height: 5' 10" (177.8 cm)  Weight: 80.4 kg (177 lb 4 oz)  BSA " (Calculated - sq m): 1.99 sq meters  BMI (Calculated): 25.4  Weight in (lb) to have BMI = 25: 173.9]     Body mass index is 25.43 kg/m².    Physical Exam  Constitutional:       Appearance: He is well-developed.   HENT:      Head: Normocephalic.   Eyes:      Pupils: Pupils are equal, round, and reactive to light.   Neck:      Thyroid: No thyromegaly.   Cardiovascular:      Rate and Rhythm: Normal rate and regular rhythm.      Heart sounds: No murmur heard.     No friction rub. No gallop.   Pulmonary:      Effort: Pulmonary effort is normal.      Breath sounds: Normal breath sounds.   Abdominal:      General: Bowel sounds are normal.      Palpations: Abdomen is soft.      Tenderness: There is abdominal tenderness in the suprapubic area. There is no right CVA tenderness, left CVA tenderness or guarding. Negative signs include Wilson's sign.   Musculoskeletal:         General: Normal range of motion.      Cervical back: Normal range of motion.   Skin:     General: Skin is warm and dry.   Neurological:      Mental Status: He is alert and oriented to person, place, and time.      Sensory: No sensory deficit.   Psychiatric:         Behavior: Behavior normal.          Laboratory:  CBC:  Recent Labs   Lab Result Units 02/16/24  1612 02/20/24  0856   WBC K/uL 8.30 3.87*   RBC M/uL 5.15 5.16   Hemoglobin g/dL 15.3 15.1   Hematocrit % 45.9 45.3   Platelets K/uL 269 281   MCV fL 89 88   MCH pg 29.7 29.3   MCHC g/dL 33.3 33.3     CMP:  Recent Labs   Lab Result Units 02/16/24  1612 02/20/24  0856 03/12/24  1405   Glucose mg/dL 95 97  --    Calcium mg/dL 9.3 9.6  --    Albumin g/dL 4.0 3.9 4.7   Total Protein g/dL 7.1 7.2 7.4   Sodium mmol/L 137 144  --    Potassium mmol/L 3.6 4.4  --    CO2 mmol/L 24 28  --    Chloride mmol/L 103 106  --    BUN mg/dL 9 9  --    Alkaline Phosphatase U/L 104 84 57   ALT U/L 346* 108* 17   AST U/L 122* 37 21   Total Bilirubin mg/dL 1.1* 0.6 0.6     URINALYSIS:  Recent Labs   Lab Result Units  "02/16/24  1551   Color, UA  Yellow   Specific Gravity, UA  1.010   pH, UA  6.0   Protein, UA  Negative   Bacteria /hpf Rare   Nitrite, UA  Negative   Leukocytes, UA  Negative      LIPIDS:  No results for input(s): "TSH", "HDL", "CHOL", "TRIG", "LDLCALC", "CHOLHDL", "NONHDLCHOL", "TOTALCHOLEST" in the last 2160 hours.  TSH:  No results for input(s): "TSH" in the last 2160 hours.  A1C:  No results for input(s): "HGBA1C" in the last 2160 hours.    Radiology:        Assessment:     Jarod Arias Sr. is a 48 y.o.male with:    Lower abdominal pain  -     HEPATIC FUNCTION PANEL; Future; Expected date: 04/25/2024    Increased urinary frequency  -     Urinalysis, Reflex to Urine Culture Urine, Clean Catch; Future; Expected date: 04/25/2024                Plan:     Problem List Items Addressed This Visit          Renal/    Increased urinary frequency    Overview     Ua cx and sens recs to follow            GI    Lower abdominal pain - Primary    Overview     Repeat hepatic panel             As above, continue current medications and maintain follow up with specialists.  Return to clinic prn [facility]                  "

## 2024-04-29 ENCOUNTER — CLINICAL SUPPORT (OUTPATIENT)
Dept: REHABILITATION | Facility: HOSPITAL | Age: 49
End: 2024-04-29
Payer: COMMERCIAL

## 2024-04-29 DIAGNOSIS — M53.86 DECREASED ROM OF LUMBAR SPINE: ICD-10-CM

## 2024-04-29 DIAGNOSIS — M54.41 ACUTE RIGHT-SIDED LOW BACK PAIN WITH RIGHT-SIDED SCIATICA: Primary | ICD-10-CM

## 2024-04-29 PROCEDURE — 97140 MANUAL THERAPY 1/> REGIONS: CPT

## 2024-04-29 PROCEDURE — 97110 THERAPEUTIC EXERCISES: CPT

## 2024-04-29 PROCEDURE — 97014 ELECTRIC STIMULATION THERAPY: CPT

## 2024-04-29 NOTE — PROGRESS NOTES
[facility]   Physical Therapy Treatment Note      Name: Jarod Cruzac Sr.  Clinic Number: [id number]    Therapy Diagnosis:   Encounter Diagnoses   Name Primary?    Acute right-sided low back pain with right-sided sciatica Yes    Decreased ROM of lumbar spine          Physician: Luigi Tristan MD    Visit Date: 2024    Physician Orders: PT Eval and Treat   Medical Diagnosis from Referral:   M54.42 (ICD-10-CM) - Lumbago with sciatica, left side   M54.41 (ICD-10-CM) - Lumbago with sciatica, right side   G89.29 (ICD-10-CM) - Other chronic pain   Evaluation Date: 2024  Authorization Period Expiration: 2024  Plan of Care Expiration: 2024  Progress Note Due: 2024  Visit # / Visits authorized:  , 3/12  FOTO: 4/5     FOTO Initial Evaluation: 53  FOTO 2nd F/U: NA  FOTO Discharge: NA     Precautions: Standard      Time In: 1045  Time Out: 1135  Total Appointment Time (timed & untimed codes): 50 minutes    PTA Visit #: 0/5       Subjective     Patient reports: he is feeling better slowly. Patient is having less calf pain overall and slightly more back pain.           He was compliant with home exercise program.  Response to previous treatment: No change   Functional change: None    Pain: 3/10  Location: right back  and buttocks      Objective      Initial Evaluation       Lumbar AROM: Pain/Dysfunction with Movement:   Flexion: 35 degrees Increased R sided back and hip pain    Extension: 20 degrees Twinge in R buttock, but better than flexion   Right side bendin degrees Calf twinge    Left side bendin degrees     Right rotation: WNL degrees     Left rotation: WNL degrees     Extension + R Rotation: WNL degree     Extension + L Rotation: WNL degree        Myotome / MMT Right Left Pain/Dysfunction with Movement   L1,2- Hip Flexion (Femoral Nerve) 4+/5 5/5     L3,4- Knee Extension (Femoral Nerve) 4+/5 ! 5/5 Pain R low back and buttock    L4,5- Ankle  Dorsiflexion (Deep Fibular Nerve) 5/5 5/5     L5, S1- Ankle plantarflexion (Tibial Nerve) 5/5 5/5     Hip Abduction 4/5 4+/5     Knee Flexion 4+/5 5/5              Repeated Motions Positive/Negative   Flexion Worsened    Extension Improves      Neural Tension Positive/Negative   Passive SLR w/ DF  + on R                   Palpation:  - Tenderness to R lower back / paraspinals         Lumbar Manual Distraction:    Treatment     Jarod received the treatments listed below:      therapeutic exercises to develop strength, endurance, ROM, and flexibility for 10 minutes including:      Supine Nerve Glides 2x10  (on heat)  LTR x15 for 3s (on heat)   Bridging 3x10 (on heat)       Not Today   SKTC 2x10 (on heat)   Gastroc stretch 30s x 4  Supine piriformis stretch 30s x 4 (on heat)   Supine hamstring stretch 30s x 4 (on heat)   Clams GTB 3x10  Prone hip extensions 3#   - 2x10         HEP Review  Access Code: [email or web address]  Exercises  - Static Prone on Elbows  - 2 x daily - 7 x weekly - 2 sets - 3 minutes hold  - Prone Press Up  - 2 x daily - 7 x weekly - 2 sets - 10 reps - 3 seconds hold  - Seated Piriformis Stretch  - 2 x daily - 7 x weekly - 3 sets - 30 seconds hold  - Supine Bridge  - 2 x daily - 7 x weekly - 3 sets - 10 reps - 1 seconds hold  - Clamshell  - 2 x daily - 7 x weekly - 3 sets - 10 reps - 1 seconds hold  - Standing Lumbar Extension with Counter  - 2 x daily - 7 x weekly - 2 sets - 10 reps - 3 seconds hold  - Supine 90/90 Sciatic Nerve Glide with Knee Flexion/Extension  - 2 x daily - 7 x weekly - 2 sets - 10 reps - 2 seconds hold  - Seated Slump Nerve Glide  - 2 x daily - 7 x weekly - 2 sets - 10 reps - 2 seconds hold    manual therapy techniques: were applied to the: Lumbar spine or R gluteal region for 40 minutes, including:      Cupping to R gluteal region     Palpation performed to determine muscular tightness and trigger points.     Functional Dry Needling to R gluteal region / piriformis with 4 75mm needles  with mechanical winding for manual stimulation and estim for electrical stimulation. Patient had great muscular contractions from estim today.               neuromuscular re-education activities to improve: Coordination, Proprioception, Posture, and Motor Control for 0 minutes. The following activities were included:        therapeutic activities to improve functional performance for 0  minutes, including:        Patient Education and Home Exercises       Education provided:   - HEP    Written Home Exercises Provided: Patient instructed to cont prior HEP. Exercises were reviewed and Jarod was able to demonstrate them prior to the end of the session.  Jarod demonstrated good  understanding of the education provided. See Electronic Medical Record under Patient Instructions for exercises provided during therapy sessions    Assessment     Patient presents to PT today with continued R lower back and buttock pain, but is slowly improving per subjective reports. Bridging was less painful and stronger. Cont to progress as tolerated.     Jarod Is progressing well towards his goals.   Patient prognosis is Good.     Patient will continue to benefit from skilled outpatient physical therapy to address the deficits listed in the problem list box on initial evaluation, provide pt/family education and to maximize pt's level of independence in the home and community environment.     Patient's spiritual, cultural and educational needs considered and pt agreeable to plan of care and goals.     Anticipated barriers to physical therapy: None    Goals:   Short Term Goals (4 Weeks):  1. Pt will be compliant with HEP to supplement PT in decreasing pain with functional mobility.  2. Pt will perform pallof press with good control to demonstrate improved core strength.  3. Pt will improve lumbar ROM by 5-10 degrees in all planes to improve functional mobility.  4. Pt will improve impaired LE MMTs by 1/2 score to improve strength for  functional tasks.  Long Term Goals (8 Weeks):   1. Pt will improve FOTO score to >/= 65 to decrease perceived limitation with maintaining/changing body position.   2. Pt will perform squat with good form to reduce risk of re injury with lifting.  3. Pt will improve impaired LE MMTs by 1 score to improve strength for functional tasks.  4. Pt will report no pain during lumbar ROM to promote functional mobility.         PLAN   Plan of care Certification: 4/17/2024 to 6/17/2024.     Outpatient Physical Therapy 1-2 times weekly for 8 weeks to include the following interventions: Cervical/Lumbar Traction, Manual Therapy, Moist Heat/ Ice, Neuromuscular Re-ed, Patient Education, Self Care, Therapeutic Activities, and FDN.        Eduar Marion, PT

## 2024-05-03 ENCOUNTER — CLINICAL SUPPORT (OUTPATIENT)
Dept: REHABILITATION | Facility: HOSPITAL | Age: 49
End: 2024-05-03
Payer: COMMERCIAL

## 2024-05-03 DIAGNOSIS — M54.41 ACUTE RIGHT-SIDED LOW BACK PAIN WITH RIGHT-SIDED SCIATICA: Primary | ICD-10-CM

## 2024-05-03 DIAGNOSIS — M53.86 DECREASED ROM OF LUMBAR SPINE: ICD-10-CM

## 2024-05-03 PROCEDURE — 97110 THERAPEUTIC EXERCISES: CPT

## 2024-05-03 PROCEDURE — 97140 MANUAL THERAPY 1/> REGIONS: CPT

## 2024-05-03 NOTE — PROGRESS NOTES
[facility]   Physical Therapy Treatment Note      Name: Jarod Cruzac Sr.  Clinic Number: [id number]    Therapy Diagnosis:   Encounter Diagnoses   Name Primary?    Acute right-sided low back pain with right-sided sciatica Yes    Decreased ROM of lumbar spine            Physician: Luigi Tristan MD    Visit Date: 5/3/2024    Physician Orders: PT Eval and Treat   Medical Diagnosis from Referral:   M54.42 (ICD-10-CM) - Lumbago with sciatica, left side   M54.41 (ICD-10-CM) - Lumbago with sciatica, right side   G89.29 (ICD-10-CM) - Other chronic pain   Evaluation Date: 2024  Authorization Period Expiration: 2024  Plan of Care Expiration: 2024  Progress Note Due: 2024  Visit # / Visits authorized:  ,   FOTO:      FOTO Initial Evaluation: 53  FOTO 2nd F/U: NA  FOTO Discharge: NA     Precautions: Standard      Time In: 830  Time Out: 915  Total Appointment Time (timed & untimed codes): 45 minutes    PTA Visit #: 0/5       Subjective     Patient reports: his calf and hamstring feel tight on the R. Patient gets a jolt every now and then in the R glute and it turns into soreness.           He was compliant with home exercise program.  Response to previous treatment: No change   Functional change: None    Pain: 3/10  Location: right back  and buttocks      Objective      Initial Evaluation       Lumbar AROM: Pain/Dysfunction with Movement:   Flexion: 35 degrees Increased R sided back and hip pain    Extension: 20 degrees Twinge in R buttock, but better than flexion   Right side bendin degrees Calf twinge    Left side bendin degrees     Right rotation: WNL degrees     Left rotation: WNL degrees     Extension + R Rotation: WNL degree     Extension + L Rotation: WNL degree        Myotome / MMT Right Left Pain/Dysfunction with Movement   L1,2- Hip Flexion (Femoral Nerve) 4+/5 5/5     L3,4- Knee Extension (Femoral Nerve) 4+/5 ! 5/5 Pain R low back and  buttock    L4,5- Ankle Dorsiflexion (Deep Fibular Nerve) 5/5 5/5     L5, S1- Ankle plantarflexion (Tibial Nerve) 5/5 5/5     Hip Abduction 4/5 4+/5     Knee Flexion 4+/5 5/5              Repeated Motions Positive/Negative   Flexion Worsened    Extension Improves      Neural Tension Positive/Negative   Passive SLR w/ DF  + on R                   Palpation:  - Tenderness to R lower back / paraspinals         Lumbar Manual Distraction:    Treatment     Jarod received the treatments listed below:      therapeutic exercises to develop strength, endurance, ROM, and flexibility for 15 minutes including:      Gastroc stretch 30s x 4  Supine piriformis stretch 30s x 4 (on heat)   Supine hamstring stretch 30s x 4 (on heat)   Supine ITB stretch 30s x 4 (on heat)   Supine Nerve Glides 2x10  (on heat)                  Not Today   LTR x15 for 3s (on heat)   Bridging 3x10 (on heat)   SKTC 2x10 (on heat)   Clams GTB 3x10  Prone hip extensions 3#   - 2x10         HEP Review  Access Code: [email or web address]  Exercises  - Static Prone on Elbows  - 2 x daily - 7 x weekly - 2 sets - 3 minutes hold  - Prone Press Up  - 2 x daily - 7 x weekly - 2 sets - 10 reps - 3 seconds hold  - Seated Piriformis Stretch  - 2 x daily - 7 x weekly - 3 sets - 30 seconds hold  - Supine Bridge  - 2 x daily - 7 x weekly - 3 sets - 10 reps - 1 seconds hold  - Clamshell  - 2 x daily - 7 x weekly - 3 sets - 10 reps - 1 seconds hold  - Standing Lumbar Extension with Counter  - 2 x daily - 7 x weekly - 2 sets - 10 reps - 3 seconds hold  - Supine 90/90 Sciatic Nerve Glide with Knee Flexion/Extension  - 2 x daily - 7 x weekly - 2 sets - 10 reps - 2 seconds hold  - Seated Slump Nerve Glide  - 2 x daily - 7 x weekly - 2 sets - 10 reps - 2 seconds hold    manual therapy techniques: were applied to the: Lumbar spine or R gluteal region for 30 minutes, including:        IASTM to R calf and R hamstring to tender areas     Hypervolt Gun to R calf and hamstring             Not Today    Cupping to R gluteal region     Palpation performed to determine muscular tightness and trigger points.     Functional Dry Needling to R gluteal region / piriformis with 4 75mm needles with mechanical winding for manual stimulation and estim for electrical stimulation. Patient had great muscular contractions from estim today.               neuromuscular re-education activities to improve: Coordination, Proprioception, Posture, and Motor Control for 0 minutes. The following activities were included:        therapeutic activities to improve functional performance for 0  minutes, including:        Patient Education and Home Exercises       Education provided:   - HEP    Written Home Exercises Provided: Patient instructed to cont prior HEP. Exercises were reviewed and Jarod was able to demonstrate them prior to the end of the session.  Jarod demonstrated good  understanding of the education provided. See Electronic Medical Record under Patient Instructions for exercises provided during therapy sessions    Assessment     Patient continues to present with R lower back pain and radicular symptoms down the R leg. Focus continues to be on reducing R lower leg nerve tension and tissue tightness. Cont with extension based activities as well since patient feels worst with sitting and flexion activities.     Jarod Is progressing well towards his goals.   Patient prognosis is Good.     Patient will continue to benefit from skilled outpatient physical therapy to address the deficits listed in the problem list box on initial evaluation, provide pt/family education and to maximize pt's level of independence in the home and community environment.     Patient's spiritual, cultural and educational needs considered and pt agreeable to plan of care and goals.     Anticipated barriers to physical therapy: None    Goals:   Short Term Goals (4 Weeks):  1. Pt will be compliant with HEP to supplement PT in decreasing pain with functional  mobility.  2. Pt will perform pallof press with good control to demonstrate improved core strength.  3. Pt will improve lumbar ROM by 5-10 degrees in all planes to improve functional mobility.  4. Pt will improve impaired LE MMTs by 1/2 score to improve strength for functional tasks.  Long Term Goals (8 Weeks):   1. Pt will improve FOTO score to >/= 65 to decrease perceived limitation with maintaining/changing body position.   2. Pt will perform squat with good form to reduce risk of re injury with lifting.  3. Pt will improve impaired LE MMTs by 1 score to improve strength for functional tasks.  4. Pt will report no pain during lumbar ROM to promote functional mobility.         PLAN   Plan of care Certification: 4/17/2024 to 6/17/2024.     Outpatient Physical Therapy 1-2 times weekly for 8 weeks to include the following interventions: Cervical/Lumbar Traction, Manual Therapy, Moist Heat/ Ice, Neuromuscular Re-ed, Patient Education, Self Care, Therapeutic Activities, and FDN.        Eduar Marion, PT

## 2024-05-06 ENCOUNTER — CLINICAL SUPPORT (OUTPATIENT)
Dept: REHABILITATION | Facility: HOSPITAL | Age: 49
End: 2024-05-06
Payer: COMMERCIAL

## 2024-05-06 DIAGNOSIS — M54.41 ACUTE RIGHT-SIDED LOW BACK PAIN WITH RIGHT-SIDED SCIATICA: Primary | ICD-10-CM

## 2024-05-06 DIAGNOSIS — M53.86 DECREASED ROM OF LUMBAR SPINE: ICD-10-CM

## 2024-05-06 PROCEDURE — 97140 MANUAL THERAPY 1/> REGIONS: CPT | Mod: CQ

## 2024-05-06 PROCEDURE — 97110 THERAPEUTIC EXERCISES: CPT | Mod: CQ

## 2024-05-06 NOTE — PROGRESS NOTES
[facility]   Physical Therapy Treatment Note      Name: Jarod Cruzac Sr.  Clinic Number: [id number]    Therapy Diagnosis:   Encounter Diagnoses   Name Primary?    Acute right-sided low back pain with right-sided sciatica Yes    Decreased ROM of lumbar spine            Physician: Luigi Tristan MD    Visit Date: 2024    Physician Orders: PT Eval and Treat   Medical Diagnosis from Referral:   M54.42 (ICD-10-CM) - Lumbago with sciatica, left side   M54.41 (ICD-10-CM) - Lumbago with sciatica, right side   G89.29 (ICD-10-CM) - Other chronic pain   Evaluation Date: 2024  Authorization Period Expiration: 2024  Plan of Care Expiration: 2024  Progress Note Due: 2024  Visit # / Visits authorized:  ,   FOTO:      FOTO Initial Evaluation: 53  FOTO 2nd F/U: NA  FOTO Discharge: NA     Precautions: Standard      Time In: 830  Time Out: 915  Total Appointment Time (timed & untimed codes): 45 minutes    PTA Visit #:        Subjective     Patient reports: he would like to get back to playing softball. He is about the same today. He did get relief after the last session until the next day.  He was compliant with home exercise program.  Response to previous treatment: No change   Functional change: None    Pain: 10  Location: right back  and buttocks      Objective      Initial Evaluation       Lumbar AROM: Pain/Dysfunction with Movement:   Flexion: 35 degrees Increased R sided back and hip pain    Extension: 20 degrees Twinge in R buttock, but better than flexion   Right side bendin degrees Calf twinge    Left side bendin degrees     Right rotation: WNL degrees     Left rotation: WNL degrees     Extension + R Rotation: WNL degree     Extension + L Rotation: WNL degree        Myotome / MMT Right Left Pain/Dysfunction with Movement   L1,2- Hip Flexion (Femoral Nerve) 4+/5 5/5     L3,4- Knee Extension (Femoral Nerve) 4+/5 ! 5/5 Pain R low back and  buttock    L4,5- Ankle Dorsiflexion (Deep Fibular Nerve) 5/5 5/5     L5, S1- Ankle plantarflexion (Tibial Nerve) 5/5 5/5     Hip Abduction 4/5 4+/5     Knee Flexion 4+/5 5/5              Repeated Motions Positive/Negative   Flexion Worsened    Extension Improves      Neural Tension Positive/Negative   Passive SLR w/ DF  + on R                   Palpation:  - Tenderness to R lower back / paraspinals         Lumbar Manual Distraction:    Treatment     Jarod received the treatments listed below:      therapeutic exercises to develop strength, endurance, ROM, and flexibility for 30 minutes including:      Gastroc stretch 30s x 4  Supine piriformis stretch 30s x 4 (on heat)   Supine hamstring stretch 30s x 4 (on heat)   Supine ITB stretch 30s x 4 (on heat)   Supine Nerve Glides 2x10  (on heat)                  Not Today   LTR x15 for 3s (on heat)   Bridging 3x10 (on heat)   SKTC 2x10 (on heat)   Clams GTB 3x10  Prone hip extensions 3#   - 2x10         HEP Review  Access Code: [email or web address]  Exercises  - Static Prone on Elbows  - 2 x daily - 7 x weekly - 2 sets - 3 minutes hold  - Prone Press Up  - 2 x daily - 7 x weekly - 2 sets - 10 reps - 3 seconds hold  - Seated Piriformis Stretch  - 2 x daily - 7 x weekly - 3 sets - 30 seconds hold  - Supine Bridge  - 2 x daily - 7 x weekly - 3 sets - 10 reps - 1 seconds hold  - Clamshell  - 2 x daily - 7 x weekly - 3 sets - 10 reps - 1 seconds hold  - Standing Lumbar Extension with Counter  - 2 x daily - 7 x weekly - 2 sets - 10 reps - 3 seconds hold  - Supine 90/90 Sciatic Nerve Glide with Knee Flexion/Extension  - 2 x daily - 7 x weekly - 2 sets - 10 reps - 2 seconds hold  - Seated Slump Nerve Glide  - 2 x daily - 7 x weekly - 2 sets - 10 reps - 2 seconds hold    manual therapy techniques: were applied to the: Lumbar spine or R gluteal region for 15 minutes, including:        IASTM to R calf and R hamstring to tender areas   (R) LLD  Hypervolt Gun to R calf and hamstring              Not Today   Cupping to R gluteal region     Palpation performed to determine muscular tightness and trigger points.     Functional Dry Needling to R gluteal region / piriformis with 4 75mm needles with mechanical winding for manual stimulation and estim for electrical stimulation. Patient had great muscular contractions from estim today.               neuromuscular re-education activities to improve: Coordination, Proprioception, Posture, and Motor Control for 0 minutes. The following activities were included:        therapeutic activities to improve functional performance for 0  minutes, including:        Patient Education and Home Exercises       Education provided:   - HEP    Written Home Exercises Provided: Patient instructed to cont prior HEP. Exercises were reviewed and Jarod was able to demonstrate them prior to the end of the session.  Jarod demonstrated good  understanding of the education provided. See Electronic Medical Record under Patient Instructions for exercises provided during therapy sessions    Assessment     Patient continues to present with R lower back pain and radicular symptoms down the R leg. Slight R leg discrepancy noted and improved after LLD. Patient feels the needling helps the most therefore will assess at next visit.    Jarod Is progressing well towards his goals.   Patient prognosis is Good.     Patient will continue to benefit from skilled outpatient physical therapy to address the deficits listed in the problem list box on initial evaluation, provide pt/family education and to maximize pt's level of independence in the home and community environment.     Patient's spiritual, cultural and educational needs considered and pt agreeable to plan of care and goals.     Anticipated barriers to physical therapy: None    Goals:   Short Term Goals (4 Weeks):  1. Pt will be compliant with HEP to supplement PT in decreasing pain with functional mobility.  2. Pt will perform pallof  press with good control to demonstrate improved core strength.  3. Pt will improve lumbar ROM by 5-10 degrees in all planes to improve functional mobility.  4. Pt will improve impaired LE MMTs by 1/2 score to improve strength for functional tasks.  Long Term Goals (8 Weeks):   1. Pt will improve FOTO score to >/= 65 to decrease perceived limitation with maintaining/changing body position.   2. Pt will perform squat with good form to reduce risk of re injury with lifting.  3. Pt will improve impaired LE MMTs by 1 score to improve strength for functional tasks.  4. Pt will report no pain during lumbar ROM to promote functional mobility.         PLAN   Plan of care Certification: 4/17/2024 to 6/17/2024.     Outpatient Physical Therapy 1-2 times weekly for 8 weeks to include the following interventions: Cervical/Lumbar Traction, Manual Therapy, Moist Heat/ Ice, Neuromuscular Re-ed, Patient Education, Self Care, Therapeutic Activities, and FDN.        Tavo Bhat, PTA

## 2024-05-10 ENCOUNTER — CLINICAL SUPPORT (OUTPATIENT)
Dept: REHABILITATION | Facility: HOSPITAL | Age: 49
End: 2024-05-10
Payer: COMMERCIAL

## 2024-05-10 DIAGNOSIS — M54.41 ACUTE RIGHT-SIDED LOW BACK PAIN WITH RIGHT-SIDED SCIATICA: Primary | ICD-10-CM

## 2024-05-10 DIAGNOSIS — M53.86 DECREASED ROM OF LUMBAR SPINE: ICD-10-CM

## 2024-05-10 PROCEDURE — 97110 THERAPEUTIC EXERCISES: CPT | Mod: CQ

## 2024-05-10 PROCEDURE — 97140 MANUAL THERAPY 1/> REGIONS: CPT | Mod: CQ

## 2024-05-10 NOTE — PROGRESS NOTES
[facility]   Physical Therapy Treatment Note      Name: Jarod Cruzac Sr.  Clinic Number: [id number]    Therapy Diagnosis:   Encounter Diagnoses   Name Primary?    Acute right-sided low back pain with right-sided sciatica Yes    Decreased ROM of lumbar spine            Physician: Luigi Tristan MD    Visit Date: 5/10/2024    Physician Orders: PT Eval and Treat   Medical Diagnosis from Referral:   M54.42 (ICD-10-CM) - Lumbago with sciatica, left side   M54.41 (ICD-10-CM) - Lumbago with sciatica, right side   G89.29 (ICD-10-CM) - Other chronic pain   Evaluation Date: 2024  Authorization Period Expiration: 2024  Plan of Care Expiration: 2024  Progress Note Due: 2024  Visit # / Visits authorized:  ,   FOTO:      FOTO Initial Evaluation: 53  FOTO 2nd F/U: NA  FOTO Discharge: NA     Precautions: Standard      Time In: 830  Time Out: 930  Total Appointment Time (timed & untimed codes): 60 minutes    PTA Visit #:        Subjective     Patient reports: he is hurting a bit more today because he attended a graduation ceremony in which he was sitting for a few hours. This definitely inc'd the symptoms.  He was compliant with home exercise program.  Response to previous treatment: No change   Functional change: None    Pain: 5/10  Location: right back  and buttocks      Objective      Initial Evaluation       Lumbar AROM: Pain/Dysfunction with Movement:   Flexion: 35 degrees Increased R sided back and hip pain    Extension: 20 degrees Twinge in R buttock, but better than flexion   Right side bendin degrees Calf twinge    Left side bendin degrees     Right rotation: WNL degrees     Left rotation: WNL degrees     Extension + R Rotation: WNL degree     Extension + L Rotation: WNL degree        Myotome / MMT Right Left Pain/Dysfunction with Movement   L1,2- Hip Flexion (Femoral Nerve) 4+/5 5/5     L3,4- Knee Extension (Femoral Nerve) 4+/5 ! 5/5 Pain  R low back and buttock    L4,5- Ankle Dorsiflexion (Deep Fibular Nerve) 5/5 5/5     L5, S1- Ankle plantarflexion (Tibial Nerve) 5/5 5/5     Hip Abduction 4/5 4+/5     Knee Flexion 4+/5 5/5              Repeated Motions Positive/Negative   Flexion Worsened    Extension Improves      Neural Tension Positive/Negative   Passive SLR w/ DF  + on R                   Palpation:  - Tenderness to R lower back / paraspinals         Lumbar Manual Distraction:    Treatment     aJrod received the treatments listed below:      therapeutic exercises to develop strength, endurance, ROM, and flexibility for 40 minutes including:      Gastroc stretch 30s x 4  Supine piriformis stretch 30s x 4 (on heat)   Supine hamstring stretch 30s x 4 (on heat)   Supine ITB stretch 30s x 4 (on heat)   Supine Nerve Glides 2x10  (on heat)                  Not Today   LTR x15 for 3s (on heat)   Bridging 3x10 (on heat)   SKTC 2x10 (on heat)   Clams GTB 3x10  Prone hip extensions 3#   - 2x10         HEP Review  Access Code: [email or web address]  Exercises  - Static Prone on Elbows  - 2 x daily - 7 x weekly - 2 sets - 3 minutes hold  - Prone Press Up  - 2 x daily - 7 x weekly - 2 sets - 10 reps - 3 seconds hold  - Seated Piriformis Stretch  - 2 x daily - 7 x weekly - 3 sets - 30 seconds hold  - Supine Bridge  - 2 x daily - 7 x weekly - 3 sets - 10 reps - 1 seconds hold  - Clamshell  - 2 x daily - 7 x weekly - 3 sets - 10 reps - 1 seconds hold  - Standing Lumbar Extension with Counter  - 2 x daily - 7 x weekly - 2 sets - 10 reps - 3 seconds hold  - Supine 90/90 Sciatic Nerve Glide with Knee Flexion/Extension  - 2 x daily - 7 x weekly - 2 sets - 10 reps - 2 seconds hold  - Seated Slump Nerve Glide  - 2 x daily - 7 x weekly - 2 sets - 10 reps - 2 seconds hold    manual therapy techniques: were applied to the: Lumbar spine or R gluteal region for 20 minutes, including:        IASTM to R calf and R hamstring to tender areas   Hypervolt Gun to R calf and hamstring              Not Today   Cupping to R gluteal region     Palpation performed to determine muscular tightness and trigger points.     Functional Dry Needling to R gluteal region / piriformis with 4 75mm needles with mechanical winding for manual stimulation and estim for electrical stimulation. Patient had great muscular contractions from estim today.               neuromuscular re-education activities to improve: Coordination, Proprioception, Posture, and Motor Control for 0 minutes. The following activities were included:        therapeutic activities to improve functional performance for 0  minutes, including:        Patient Education and Home Exercises       Education provided:   - HEP    Written Home Exercises Provided: Patient instructed to cont prior HEP. Exercises were reviewed and Jarod was able to demonstrate them prior to the end of the session.  Jarod demonstrated good  understanding of the education provided. See Electronic Medical Record under Patient Instructions for exercises provided during therapy sessions    Assessment     Patient continues to present with R lower back pain and radicular symptoms down the R leg. (R) calf knots and adhesions noted initially with roller stick. This did improve by the end of manual session. Due to time constraints and availability needling not performed, will plan to needle with Eduar Marion PT next session.    Jarod Is progressing well towards his goals.   Patient prognosis is Good.     Patient will continue to benefit from skilled outpatient physical therapy to address the deficits listed in the problem list box on initial evaluation, provide pt/family education and to maximize pt's level of independence in the home and community environment.     Patient's spiritual, cultural and educational needs considered and pt agreeable to plan of care and goals.     Anticipated barriers to physical therapy: None    Goals:   Short Term Goals (4 Weeks):  1. Pt will be  compliant with HEP to supplement PT in decreasing pain with functional mobility.  2. Pt will perform pallof press with good control to demonstrate improved core strength.  3. Pt will improve lumbar ROM by 5-10 degrees in all planes to improve functional mobility.  4. Pt will improve impaired LE MMTs by 1/2 score to improve strength for functional tasks.  Long Term Goals (8 Weeks):   1. Pt will improve FOTO score to >/= 65 to decrease perceived limitation with maintaining/changing body position.   2. Pt will perform squat with good form to reduce risk of re injury with lifting.  3. Pt will improve impaired LE MMTs by 1 score to improve strength for functional tasks.  4. Pt will report no pain during lumbar ROM to promote functional mobility.         PLAN   Plan of care Certification: 4/17/2024 to 6/17/2024.     Outpatient Physical Therapy 1-2 times weekly for 8 weeks to include the following interventions: Cervical/Lumbar Traction, Manual Therapy, Moist Heat/ Ice, Neuromuscular Re-ed, Patient Education, Self Care, Therapeutic Activities, and FDN.        Tavo Bhat, PTA

## 2024-05-13 ENCOUNTER — CLINICAL SUPPORT (OUTPATIENT)
Dept: REHABILITATION | Facility: HOSPITAL | Age: 49
End: 2024-05-13
Payer: COMMERCIAL

## 2024-05-13 DIAGNOSIS — M54.41 ACUTE RIGHT-SIDED LOW BACK PAIN WITH RIGHT-SIDED SCIATICA: Primary | ICD-10-CM

## 2024-05-13 DIAGNOSIS — M53.86 DECREASED ROM OF LUMBAR SPINE: ICD-10-CM

## 2024-05-13 PROCEDURE — 97140 MANUAL THERAPY 1/> REGIONS: CPT

## 2024-05-13 PROCEDURE — 97110 THERAPEUTIC EXERCISES: CPT

## 2024-05-13 PROCEDURE — 97014 ELECTRIC STIMULATION THERAPY: CPT

## 2024-05-13 NOTE — PROGRESS NOTES
[facility]   Physical Therapy Treatment Note      Name: Jarod Cruzac Sr.  Clinic Number: [id number]    Therapy Diagnosis:   Encounter Diagnoses   Name Primary?    Acute right-sided low back pain with right-sided sciatica Yes    Decreased ROM of lumbar spine          Physician: Luigi Tristan MD    Visit Date: 2024    Physician Orders: PT Eval and Treat   Medical Diagnosis from Referral:   M54.42 (ICD-10-CM) - Lumbago with sciatica, left side   M54.41 (ICD-10-CM) - Lumbago with sciatica, right side   G89.29 (ICD-10-CM) - Other chronic pain   Evaluation Date: 2024  Authorization Period Expiration: 2024  Plan of Care Expiration: 2024  Progress Note Due: 2024  Visit # / Visits authorized:  ,   FOTO: 8     FOTO Initial Evaluation: 53  FOTO 2nd F/U: NA  FOTO Discharge: NA     Precautions: Standard      Time In: 915  Time Out: 1003  Total Appointment Time (timed & untimed codes): 48 minutes    PTA Visit #: 0/5       Subjective     Patient reports: he is seeing incremental improvements from therapy recently. But still having pain in the R back and buttock and down the R leg. Patient reports his pain is a little higher in the back today      He was compliant with home exercise program.  Response to previous treatment: No change   Functional change: None    Pain: 10  Location: right back  and buttocks      Objective      Initial Evaluation       Lumbar AROM: Pain/Dysfunction with Movement:   Flexion: 35 degrees Increased R sided back and hip pain    Extension: 20 degrees Twinge in R buttock, but better than flexion   Right side bendin degrees Calf twinge    Left side bendin degrees     Right rotation: WNL degrees     Left rotation: WNL degrees     Extension + R Rotation: WNL degree     Extension + L Rotation: WNL degree        Myotome / MMT Right Left Pain/Dysfunction with Movement   L1,2- Hip Flexion (Femoral Nerve) 4+/5 5/5     L3,4- Knee  Extension (Femoral Nerve) 4+/5 ! 5/5 Pain R low back and buttock    L4,5- Ankle Dorsiflexion (Deep Fibular Nerve) 5/5 5/5     L5, S1- Ankle plantarflexion (Tibial Nerve) 5/5 5/5     Hip Abduction 4/5 4+/5     Knee Flexion 4+/5 5/5              Repeated Motions Positive/Negative   Flexion Worsened    Extension Improves      Neural Tension Positive/Negative   Passive SLR w/ DF  + on R                   Palpation:  - Tenderness to R lower back / paraspinals         Lumbar Manual Distraction:    Treatment     Jarod received the treatments listed below:      therapeutic exercises to develop strength, endurance, ROM, and flexibility for 8 minutes including:      Supine Nerve Glides 2x10  (on heat)        [facility] Review  Access Code: [email or web address]  Exercises  - Static Prone on Elbows  - 2 x daily - 7 x weekly - 2 sets - 3 minutes hold  - Prone Press Up  - 2 x daily - 7 x weekly - 2 sets - 10 reps - 3 seconds hold  - Seated Piriformis Stretch  - 2 x daily - 7 x weekly - 3 sets - 30 seconds hold  - Supine Bridge  - 2 x daily - 7 x weekly - 3 sets - 10 reps - 1 seconds hold  - Clamshell  - 2 x daily - 7 x weekly - 3 sets - 10 reps - 1 seconds hold  - Standing Lumbar Extension with Counter  - 2 x daily - 7 x weekly - 2 sets - 10 reps - 3 seconds hold  - Supine 90/90 Sciatic Nerve Glide with Knee Flexion/Extension  - 2 x daily - 7 x weekly - 2 sets - 10 reps - 2 seconds hold  - Seated Slump Nerve Glide  - 2 x daily - 7 x weekly - 2 sets - 10 reps - 2 seconds hold              Not today   Gastroc stretch 30s x 4  Supine piriformis stretch 30s x 4 (on heat)   Supine hamstring stretch 30s x 4 (on heat)   Supine ITB stretch 30s x 4 (on heat)   LTR x15 for 3s (on heat)   Bridging 3x10 (on heat)   SKTC 2x10 (on heat)   Clams GTB 3x10  Prone hip extensions 3#   - 2x10             manual therapy techniques: were applied to the: Lumbar spine or R gluteal region for 40 minutes, including:    Palpation performed to determine muscular tightness and  trigger points.       Functional Dry Needling to R gluteal region / piriformis with 4 75mm needles with mechanical winding for manual stimulation and estim for electrical stimulation. Patient had great muscular contractions from estim today.       Cupping to R gluteal region     Lumbar distraction       Not Today   IASTM to R calf and R hamstring to tender areas   Hypervolt Gun to R calf and hamstring                               neuromuscular re-education activities to improve: Coordination, Proprioception, Posture, and Motor Control for 0 minutes. The following activities were included:        therapeutic activities to improve functional performance for 0  minutes, including:        Patient Education and Home Exercises       Education provided:   - HEP  - education on kneeling chair  - education on sitting/standing time frames at work      Written Home Exercises Provided: Patient instructed to cont prior HEP. Exercises were reviewed and Jarod was able to demonstrate them prior to the end of the session.  Jarod demonstrated good  understanding of the education provided. See Electronic Medical Record under Patient Instructions for exercises provided during therapy sessions    Assessment     Patient continues to present with R lower back pain and radicular symptoms down the R leg, but is improving. Patient is improving with tolerance to sitting and improving range with nerve glides. Progressed needling back into session and lumbar distraction.       Jarod Is progressing well towards his goals.   Patient prognosis is Good.     Patient will continue to benefit from skilled outpatient physical therapy to address the deficits listed in the problem list box on initial evaluation, provide pt/family education and to maximize pt's level of independence in the home and community environment.     Patient's spiritual, cultural and educational needs considered and pt agreeable to plan of care and goals.     Anticipated barriers  to physical therapy: None    Goals:   Short Term Goals (4 Weeks):  1. Pt will be compliant with HEP to supplement PT in decreasing pain with functional mobility.  2. Pt will perform pallof press with good control to demonstrate improved core strength.  3. Pt will improve lumbar ROM by 5-10 degrees in all planes to improve functional mobility.  4. Pt will improve impaired LE MMTs by 1/2 score to improve strength for functional tasks.  Long Term Goals (8 Weeks):   1. Pt will improve FOTO score to >/= 65 to decrease perceived limitation with maintaining/changing body position.   2. Pt will perform squat with good form to reduce risk of re injury with lifting.  3. Pt will improve impaired LE MMTs by 1 score to improve strength for functional tasks.  4. Pt will report no pain during lumbar ROM to promote functional mobility.         PLAN   Plan of care Certification: 4/17/2024 to 6/17/2024.     Outpatient Physical Therapy 1-2 times weekly for 8 weeks to include the following interventions: Cervical/Lumbar Traction, Manual Therapy, Moist Heat/ Ice, Neuromuscular Re-ed, Patient Education, Self Care, Therapeutic Activities, and FDN.        Eduar Marion, PT

## 2024-05-17 ENCOUNTER — CLINICAL SUPPORT (OUTPATIENT)
Dept: REHABILITATION | Facility: HOSPITAL | Age: 49
End: 2024-05-17
Payer: COMMERCIAL

## 2024-05-17 DIAGNOSIS — M54.41 ACUTE RIGHT-SIDED LOW BACK PAIN WITH RIGHT-SIDED SCIATICA: Primary | ICD-10-CM

## 2024-05-17 DIAGNOSIS — M53.86 DECREASED ROM OF LUMBAR SPINE: ICD-10-CM

## 2024-05-17 PROCEDURE — 97110 THERAPEUTIC EXERCISES: CPT

## 2024-05-17 PROCEDURE — 97140 MANUAL THERAPY 1/> REGIONS: CPT

## 2024-05-17 PROCEDURE — 97530 THERAPEUTIC ACTIVITIES: CPT

## 2024-05-17 NOTE — PROGRESS NOTES
[facility]   Physical Therapy Treatment Note      Name: Jarod Arias Sr.  Clinic Number: [id number]    Therapy Diagnosis:   No diagnosis found.        Physician: Luigi Tristan MD    Visit Date: 2024    Physician Orders: PT Eval and Treat   Medical Diagnosis from Referral:   M54.42 (ICD-10-CM) - Lumbago with sciatica, left side   M54.41 (ICD-10-CM) - Lumbago with sciatica, right side   G89.29 (ICD-10-CM) - Other chronic pain   Evaluation Date: 2024  Authorization Period Expiration: 2024  Plan of Care Expiration: 2024  Progress Note Due: 2024  Visit # / Visits authorized:  ,   FOTO: 9     FOTO Initial Evaluation: 53  FOTO 2nd F/U: NA  FOTO Discharge: NA     Precautions: Standard      Time In: 745  Time Out: 830  Total Appointment Time (timed & untimed codes): 45 minutes    PTA Visit #: 0/5       Subjective     Patient reports: he is seeing incremental improvements from therapy recently.       He was compliant with home exercise program.  Response to previous treatment: No change   Functional change: None    Pain: 4/10  Location: right back  and buttocks      Objective      Initial Evaluation       Lumbar AROM: Pain/Dysfunction with Movement:   Flexion: 35 degrees Increased R sided back and hip pain    Extension: 20 degrees Twinge in R buttock, but better than flexion   Right side bendin degrees Calf twinge    Left side bendin degrees     Right rotation: WNL degrees     Left rotation: WNL degrees     Extension + R Rotation: WNL degree     Extension + L Rotation: WNL degree        Myotome / MMT Right Left Pain/Dysfunction with Movement   L1,2- Hip Flexion (Femoral Nerve) 4+/5 5/5     L3,4- Knee Extension (Femoral Nerve) 4+/5 ! 5/5 Pain R low back and buttock    L4,5- Ankle Dorsiflexion (Deep Fibular Nerve) 5/5 5/5     L5, S1- Ankle plantarflexion (Tibial Nerve) 5/5 5/5     Hip Abduction 4/5 4+/5     Knee Flexion 4+/5 5/5               Repeated Motions Positive/Negative   Flexion Worsened    Extension Improves      Neural Tension Positive/Negative   Passive SLR w/ DF  + on R                   Palpation:  - Tenderness to R lower back / paraspinals         Lumbar Manual Distraction:    Treatment     Jarod received the treatments listed below:      therapeutic exercises to develop strength, endurance, ROM, and flexibility for 25 minutes including:    Piriformis stretch 30s x 4   Single knee to chest w/ SB   - 20x   Supine Nerve Glides 2x10  (on heat)  Shuttle extension   - 0.5 cord   - 3x10   Shuttle dl press   - 3c 3x10         HEP Review  Access Code: [email or web address]  Exercises  - Static Prone on Elbows  - 2 x daily - 7 x weekly - 2 sets - 3 minutes hold  - Prone Press Up  - 2 x daily - 7 x weekly - 2 sets - 10 reps - 3 seconds hold  - Seated Piriformis Stretch  - 2 x daily - 7 x weekly - 3 sets - 30 seconds hold  - Supine Bridge  - 2 x daily - 7 x weekly - 3 sets - 10 reps - 1 seconds hold  - Clamshell  - 2 x daily - 7 x weekly - 3 sets - 10 reps - 1 seconds hold  - Standing Lumbar Extension with Counter  - 2 x daily - 7 x weekly - 2 sets - 10 reps - 3 seconds hold  - Supine 90/90 Sciatic Nerve Glide with Knee Flexion/Extension  - 2 x daily - 7 x weekly - 2 sets - 10 reps - 2 seconds hold  - Seated Slump Nerve Glide  - 2 x daily - 7 x weekly - 2 sets - 10 reps - 2 seconds hold              Not today   Gastroc stretch 30s x 4  Supine piriformis stretch 30s x 4 (on heat)   Supine hamstring stretch 30s x 4 (on heat)   Supine ITB stretch 30s x 4 (on heat)   LTR x15 for 3s (on heat)   Bridging 3x10 (on heat)   SKTC 2x10 (on heat)   Clams GTB 3x10  Prone hip extensions 3#   - 2x10             manual therapy techniques: were applied to the: Lumbar spine or R gluteal region for 10 minutes, including:        Hypervolt Gun to R glute           Not today   Palpation performed to determine muscular tightness and trigger points.       Functional Dry Needling to R  gluteal region / piriformis with 4 75mm needles with mechanical winding for manual stimulation and estim for electrical stimulation. Patient had great muscular contractions from estim today.       Cupping to R gluteal region     Lumbar distraction       IASTM to R calf and R hamstring to tender areas                               neuromuscular re-education activities to improve: Coordination, Proprioception, Posture, and Motor Control for 10 minutes. The following activities were included:      PPT w/ biofeedback 2x10  PPT w/ biofeedback and kick outs 2x10         therapeutic activities to improve functional performance for 0  minutes, including:        Patient Education and Home Exercises       Education provided:   - HEP  - education on kneeling chair  - education on sitting/standing time frames at work      Written Home Exercises Provided: Patient instructed to cont prior HEP. Exercises were reviewed and Jarod was able to demonstrate them prior to the end of the session.  Jarod demonstrated good  understanding of the education provided. See Electronic Medical Record under Patient Instructions for exercises provided during therapy sessions    Assessment     Patient continues to present with R lower back pain and radicular symptoms down the R leg, but is improving. Patient is improving with tolerance to sitting and improving range with nerve glides. Progressed manual therapy back into his session       Jarod Is progressing well towards his goals.   Patient prognosis is Good.     Patient will continue to benefit from skilled outpatient physical therapy to address the deficits listed in the problem list box on initial evaluation, provide pt/family education and to maximize pt's level of independence in the home and community environment.     Patient's spiritual, cultural and educational needs considered and pt agreeable to plan of care and goals.     Anticipated barriers to physical therapy: None    Goals:   Short  Term Goals (4 Weeks):  1. Pt will be compliant with HEP to supplement PT in decreasing pain with functional mobility.  2. Pt will perform pallof press with good control to demonstrate improved core strength.  3. Pt will improve lumbar ROM by 5-10 degrees in all planes to improve functional mobility.  4. Pt will improve impaired LE MMTs by 1/2 score to improve strength for functional tasks.  Long Term Goals (8 Weeks):   1. Pt will improve FOTO score to >/= 65 to decrease perceived limitation with maintaining/changing body position.   2. Pt will perform squat with good form to reduce risk of re injury with lifting.  3. Pt will improve impaired LE MMTs by 1 score to improve strength for functional tasks.  4. Pt will report no pain during lumbar ROM to promote functional mobility.         PLAN   Plan of care Certification: 4/17/2024 to 6/17/2024.     Outpatient Physical Therapy 1-2 times weekly for 8 weeks to include the following interventions: Cervical/Lumbar Traction, Manual Therapy, Moist Heat/ Ice, Neuromuscular Re-ed, Patient Education, Self Care, Therapeutic Activities, and FDN.        Eduar Marion, PT

## 2024-05-20 ENCOUNTER — CLINICAL SUPPORT (OUTPATIENT)
Dept: REHABILITATION | Facility: HOSPITAL | Age: 49
End: 2024-05-20
Payer: COMMERCIAL

## 2024-05-20 DIAGNOSIS — M54.41 ACUTE RIGHT-SIDED LOW BACK PAIN WITH RIGHT-SIDED SCIATICA: Primary | ICD-10-CM

## 2024-05-20 DIAGNOSIS — M53.86 DECREASED ROM OF LUMBAR SPINE: ICD-10-CM

## 2024-05-20 PROCEDURE — 97140 MANUAL THERAPY 1/> REGIONS: CPT

## 2024-05-20 PROCEDURE — 97014 ELECTRIC STIMULATION THERAPY: CPT

## 2024-05-20 NOTE — PROGRESS NOTES
[facility]   Physical Therapy Treatment Note      Name: Jarod Cruzac Sr.  Clinic Number: [id number]    Therapy Diagnosis:   Encounter Diagnoses   Name Primary?    Acute right-sided low back pain with right-sided sciatica Yes    Decreased ROM of lumbar spine            Physician: Luigi Tristan MD    Visit Date: 2024    Physician Orders: PT Eval and Treat   Medical Diagnosis from Referral:   M54.42 (ICD-10-CM) - Lumbago with sciatica, left side   M54.41 (ICD-10-CM) - Lumbago with sciatica, right side   G89.29 (ICD-10-CM) - Other chronic pain   Evaluation Date: 2024  Authorization Period Expiration: 2024  Plan of Care Expiration: 2024  Progress Note Due: 2024  Visit # / Visits authorized:  ,   FOTO: 9     FOTO Initial Evaluation: 53  FOTO 2nd F/U: NA  FOTO Discharge: NA     Precautions: Standard      Time In: 745  Time Out: 830  Total Appointment Time (timed & untimed codes): 45 minutes    PTA Visit #: 0/5       Subjective     Patient reports: he is seeing incremental improvements from therapy recently.       He was compliant with home exercise program.  Response to previous treatment: No change   Functional change: None    Pain: 4/10  Location: right back  and buttocks      Objective      Initial Evaluation       Lumbar AROM: Pain/Dysfunction with Movement:   Flexion: 35 degrees Increased R sided back and hip pain    Extension: 20 degrees Twinge in R buttock, but better than flexion   Right side bendin degrees Calf twinge    Left side bendin degrees     Right rotation: WNL degrees     Left rotation: WNL degrees     Extension + R Rotation: WNL degree     Extension + L Rotation: WNL degree        Myotome / MMT Right Left Pain/Dysfunction with Movement   L1,2- Hip Flexion (Femoral Nerve) 4+/5 5/5     L3,4- Knee Extension (Femoral Nerve) 4+/5 ! 5/5 Pain R low back and buttock    L4,5- Ankle Dorsiflexion (Deep Fibular Nerve) 5/5 5/5      L5, S1- Ankle plantarflexion (Tibial Nerve) 5/5 5/5     Hip Abduction 4/5 4+/5     Knee Flexion 4+/5 5/5              Repeated Motions Positive/Negative   Flexion Worsened    Extension Improves      Neural Tension Positive/Negative   Passive SLR w/ DF  + on R                   Palpation:  - Tenderness to R lower back / paraspinals         Lumbar Manual Distraction:    Treatment     Jarod received the treatments listed below:      therapeutic exercises to develop strength, endurance, ROM, and flexibility for 0 minutes including:    Piriformis stretch 30s x 4   Single knee to chest w/ SB   - 20x   Supine Nerve Glides 2x10  (on heat)  Shuttle extension   - 0.5 cord   - 3x10   Shuttle dl press   - 3c 3x10         HEP Review  Access Code: [email or web address]  Exercises  - Static Prone on Elbows  - 2 x daily - 7 x weekly - 2 sets - 3 minutes hold  - Prone Press Up  - 2 x daily - 7 x weekly - 2 sets - 10 reps - 3 seconds hold  - Seated Piriformis Stretch  - 2 x daily - 7 x weekly - 3 sets - 30 seconds hold  - Supine Bridge  - 2 x daily - 7 x weekly - 3 sets - 10 reps - 1 seconds hold  - Clamshell  - 2 x daily - 7 x weekly - 3 sets - 10 reps - 1 seconds hold  - Standing Lumbar Extension with Counter  - 2 x daily - 7 x weekly - 2 sets - 10 reps - 3 seconds hold  - Supine 90/90 Sciatic Nerve Glide with Knee Flexion/Extension  - 2 x daily - 7 x weekly - 2 sets - 10 reps - 2 seconds hold  - Seated Slump Nerve Glide  - 2 x daily - 7 x weekly - 2 sets - 10 reps - 2 seconds hold              Not today   Gastroc stretch 30s x 4  Supine piriformis stretch 30s x 4 (on heat)   Supine hamstring stretch 30s x 4 (on heat)   Supine ITB stretch 30s x 4 (on heat)   LTR x15 for 3s (on heat)   Bridging 3x10 (on heat)   SKTC 2x10 (on heat)   Clams GTB 3x10  Prone hip extensions 3#   - 2x10             manual therapy techniques: were applied to the: Lumbar spine or R gluteal region for 45 minutes, including:      Palpation performed to determine  muscular tightness and trigger points.       Functional Dry Needling to R gluteal region / piriformis with 4 75mm needles with mechanical winding for manual stimulation and estim for electrical stimulation. Patient had great muscular contractions from estim today.             Cupping to R gluteal region       Not today     Hypervolt Gun to R glute     Lumbar distraction       IASTM to R calf and R hamstring to tender areas                               neuromuscular re-education activities to improve: Coordination, Proprioception, Posture, and Motor Control for 0 minutes. The following activities were included:      PPT w/ biofeedback 2x10  PPT w/ biofeedback and kick outs 2x10         therapeutic activities to improve functional performance for 0  minutes, including:        Patient Education and Home Exercises       Education provided:   - HEP  - education on kneeling chair  - education on sitting/standing time frames at work      Written Home Exercises Provided: Patient instructed to cont prior HEP. Exercises were reviewed and Jarod was able to demonstrate them prior to the end of the session.  Jarod demonstrated good  understanding of the education provided. See Electronic Medical Record under Patient Instructions for exercises provided during therapy sessions    Assessment     Patient continues to present with R lower back pain and radicular symptoms down the R leg, but is improving. Patient is improving with tolerance to sitting and improving range with nerve glides. Progressed manual therapy back into his session       Jarod Is progressing well towards his goals.   Patient prognosis is Good.     Patient will continue to benefit from skilled outpatient physical therapy to address the deficits listed in the problem list box on initial evaluation, provide pt/family education and to maximize pt's level of independence in the home and community environment.     Patient's spiritual, cultural and educational needs  considered and pt agreeable to plan of care and goals.     Anticipated barriers to physical therapy: None    Goals:   Short Term Goals (4 Weeks):  1. Pt will be compliant with HEP to supplement PT in decreasing pain with functional mobility.  2. Pt will perform pallof press with good control to demonstrate improved core strength.  3. Pt will improve lumbar ROM by 5-10 degrees in all planes to improve functional mobility.  4. Pt will improve impaired LE MMTs by 1/2 score to improve strength for functional tasks.  Long Term Goals (8 Weeks):   1. Pt will improve FOTO score to >/= 65 to decrease perceived limitation with maintaining/changing body position.   2. Pt will perform squat with good form to reduce risk of re injury with lifting.  3. Pt will improve impaired LE MMTs by 1 score to improve strength for functional tasks.  4. Pt will report no pain during lumbar ROM to promote functional mobility.         PLAN   Plan of care Certification: 4/17/2024 to 6/17/2024.     Outpatient Physical Therapy 1-2 times weekly for 8 weeks to include the following interventions: Cervical/Lumbar Traction, Manual Therapy, Moist Heat/ Ice, Neuromuscular Re-ed, Patient Education, Self Care, Therapeutic Activities, and FDN.        Eduar Marion, PT

## 2024-05-27 ENCOUNTER — CLINICAL SUPPORT (OUTPATIENT)
Dept: REHABILITATION | Facility: HOSPITAL | Age: 49
End: 2024-05-27
Payer: COMMERCIAL

## 2024-05-27 DIAGNOSIS — M53.86 DECREASED ROM OF LUMBAR SPINE: ICD-10-CM

## 2024-05-27 DIAGNOSIS — M54.41 ACUTE RIGHT-SIDED LOW BACK PAIN WITH RIGHT-SIDED SCIATICA: Primary | ICD-10-CM

## 2024-05-27 PROCEDURE — 97110 THERAPEUTIC EXERCISES: CPT

## 2024-05-27 PROCEDURE — 97014 ELECTRIC STIMULATION THERAPY: CPT

## 2024-05-27 PROCEDURE — 97140 MANUAL THERAPY 1/> REGIONS: CPT

## 2024-05-27 NOTE — PROGRESS NOTES
[facility]   Physical Therapy Treatment Note      Name: Jarod Cruzac Sr.  Clinic Number: [id number]    Therapy Diagnosis:   Encounter Diagnoses   Name Primary?    Acute right-sided low back pain with right-sided sciatica Yes    Decreased ROM of lumbar spine            Physician: Luigi Tristan MD    Visit Date: 2024    Physician Orders: PT Eval and Treat   Medical Diagnosis from Referral:   M54.42 (ICD-10-CM) - Lumbago with sciatica, left side   M54.41 (ICD-10-CM) - Lumbago with sciatica, right side   G89.29 (ICD-10-CM) - Other chronic pain   Evaluation Date: 2024  Authorization Period Expiration: 2024  Plan of Care Expiration: 2024  Progress Note Due: 2024  Visit # / Visits authorized: ,   FOTO: 10     FOTO Initial Evaluation: 53  FOTO 2nd F/U: NA  FOTO Discharge: NA     Precautions: Standard      Time In: 745  Time Out: 831  Total Appointment Time (timed & untimed codes): 46 minutes    PTA Visit #: 0/5       Subjective     Patient reports: he started to wean off his meloxicam over the past few days, but is still taking the gabapentin. Patient thinks he is doing ok without the meloxicam overall.       He was compliant with home exercise program.  Response to previous treatment: No change   Functional change: None    Pain: 4/10  Location: right back  and buttocks      Objective      Initial Evaluation       Lumbar AROM: Pain/Dysfunction with Movement:   Flexion: 35 degrees Increased R sided back and hip pain    Extension: 20 degrees Twinge in R buttock, but better than flexion   Right side bendin degrees Calf twinge    Left side bendin degrees     Right rotation: WNL degrees     Left rotation: WNL degrees     Extension + R Rotation: WNL degree     Extension + L Rotation: WNL degree        Myotome / MMT Right Left Pain/Dysfunction with Movement   L1,2- Hip Flexion (Femoral Nerve) 4+/5 5/5     L3,4- Knee Extension (Femoral Nerve) 4+/5  ! 5/5 Pain R low back and buttock    L4,5- Ankle Dorsiflexion (Deep Fibular Nerve) 5/5 5/5     L5, S1- Ankle plantarflexion (Tibial Nerve) 5/5 5/5     Hip Abduction 4/5 4+/5     Knee Flexion 4+/5 5/5              Repeated Motions Positive/Negative   Flexion Worsened    Extension Improves      Neural Tension Positive/Negative   Passive SLR w/ DF  + on R                   Palpation:  - Tenderness to R lower back / paraspinals         Lumbar Manual Distraction:    Treatment     Jarod received the treatments listed below:      therapeutic exercises to develop strength, endurance, ROM, and flexibility for 23 minutes including:    Gastroc stretch 30s x 4  Prone on elbows 3 min   Prone press ups 2x10  Cat cow prayer 10x   Piriformis stretch 30s x 4   Supine Nerve Glides 2x10  (on heat)        HEP Review  Access Code: [email or web address]  Exercises  - Static Prone on Elbows  - 2 x daily - 7 x weekly - 2 sets - 3 minutes hold  - Prone Press Up  - 2 x daily - 7 x weekly - 2 sets - 10 reps - 3 seconds hold  - Seated Piriformis Stretch  - 2 x daily - 7 x weekly - 3 sets - 30 seconds hold  - Supine Bridge  - 2 x daily - 7 x weekly - 3 sets - 10 reps - 1 seconds hold  - Clamshell  - 2 x daily - 7 x weekly - 3 sets - 10 reps - 1 seconds hold  - Standing Lumbar Extension with Counter  - 2 x daily - 7 x weekly - 2 sets - 10 reps - 3 seconds hold  - Supine 90/90 Sciatic Nerve Glide with Knee Flexion/Extension  - 2 x daily - 7 x weekly - 2 sets - 10 reps - 2 seconds hold  - Seated Slump Nerve Glide  - 2 x daily - 7 x weekly - 2 sets - 10 reps - 2 seconds hold        Not today   Single knee to chest w/ SB   - 20x   Shuttle extension   - 0.5 cord   - 3x10   Shuttle dl press   - 3c 3x10   Supine piriformis stretch 30s x 4 (on heat)   Supine hamstring stretch 30s x 4 (on heat)   Supine ITB stretch 30s x 4 (on heat)   LTR x15 for 3s (on heat)   Bridging 3x10 (on heat)   SKTC 2x10 (on heat)   Clams GTB 3x10  Prone hip extensions 3#   - 2x10              manual therapy techniques: were applied to the: Lumbar spine or R gluteal region for 23 minutes, including:      Palpation performed to determine muscular tightness and trigger points.       Functional Dry Needling to R gluteal region / piriformis with 4 75mm needles with mechanical winding for manual stimulation and estim for electrical stimulation. Patient had great muscular contractions from estim today.     Functional Dry Needling to R and L lumbar paraspinals at L4-L5 with 2 40mm needles on each side. Patient was hooked to estim on R side only and had great muscular contractions from needling. Patient had no adverse effects from today           Not today   Cupping to R gluteal region   Hypervolt Gun to R glute   Lumbar distraction   IASTM to R calf and R hamstring to tender areas                               neuromuscular re-education activities to improve: Coordination, Proprioception, Posture, and Motor Control for 0 minutes. The following activities were included:      PPT w/ biofeedback 2x10  PPT w/ biofeedback and kick outs 2x10         therapeutic activities to improve functional performance for 0  minutes, including:        Patient Education and Home Exercises       Education provided:   - HEP  - education on kneeling chair  - education on sitting/standing time frames at work      Written Home Exercises Provided: Patient instructed to cont prior HEP. Exercises were reviewed and Jarod was able to demonstrate them prior to the end of the session.  Jarod demonstrated good  understanding of the education provided. See Electronic Medical Record under Patient Instructions for exercises provided during therapy sessions    Assessment     Patient continues to present with R lower back pain and radicular symptoms down the R leg, but is improving. Patient is improving with tolerance to sitting and improving range with nerve glides. Patient is able to bend forward with less pain overall as well.  Progressed manual therapy back into his session       Jarod Is progressing well towards his goals.   Patient prognosis is Good.     Patient will continue to benefit from skilled outpatient physical therapy to address the deficits listed in the problem list box on initial evaluation, provide pt/family education and to maximize pt's level of independence in the home and community environment.     Patient's spiritual, cultural and educational needs considered and pt agreeable to plan of care and goals.     Anticipated barriers to physical therapy: None    Goals:   Short Term Goals (4 Weeks):  1. Pt will be compliant with HEP to supplement PT in decreasing pain with functional mobility.  2. Pt will perform pallof press with good control to demonstrate improved core strength.  3. Pt will improve lumbar ROM by 5-10 degrees in all planes to improve functional mobility.  4. Pt will improve impaired LE MMTs by 1/2 score to improve strength for functional tasks.  Long Term Goals (8 Weeks):   1. Pt will improve FOTO score to >/= 65 to decrease perceived limitation with maintaining/changing body position.   2. Pt will perform squat with good form to reduce risk of re injury with lifting.  3. Pt will improve impaired LE MMTs by 1 score to improve strength for functional tasks.  4. Pt will report no pain during lumbar ROM to promote functional mobility.         PLAN   Plan of care Certification: 4/17/2024 to 6/17/2024.     Outpatient Physical Therapy 1-2 times weekly for 8 weeks to include the following interventions: Cervical/Lumbar Traction, Manual Therapy, Moist Heat/ Ice, Neuromuscular Re-ed, Patient Education, Self Care, Therapeutic Activities, and FDN.        Eduar Marion, PT

## 2024-06-07 ENCOUNTER — CLINICAL SUPPORT (OUTPATIENT)
Dept: REHABILITATION | Facility: HOSPITAL | Age: 49
End: 2024-06-07
Payer: COMMERCIAL

## 2024-06-07 DIAGNOSIS — M54.41 ACUTE RIGHT-SIDED LOW BACK PAIN WITH RIGHT-SIDED SCIATICA: Primary | ICD-10-CM

## 2024-06-07 DIAGNOSIS — M53.86 DECREASED ROM OF LUMBAR SPINE: ICD-10-CM

## 2024-06-07 PROCEDURE — 97110 THERAPEUTIC EXERCISES: CPT | Mod: CQ

## 2024-06-07 PROCEDURE — 97014 ELECTRIC STIMULATION THERAPY: CPT

## 2024-06-07 PROCEDURE — 97140 MANUAL THERAPY 1/> REGIONS: CPT

## 2024-06-07 NOTE — PROGRESS NOTES
[facility]   Physical Therapy Treatment Note      Name: Jarod Cruzac Sr.  Clinic Number: [id number]    Therapy Diagnosis:   Encounter Diagnoses   Name Primary?    Acute right-sided low back pain with right-sided sciatica Yes    Decreased ROM of lumbar spine            Physician: Luigi Tristan MD    Visit Date: 2024    Physician Orders: PT Eval and Treat   Medical Diagnosis from Referral:   M54.42 (ICD-10-CM) - Lumbago with sciatica, left side   M54.41 (ICD-10-CM) - Lumbago with sciatica, right side   G89.29 (ICD-10-CM) - Other chronic pain   Evaluation Date: 2024  Authorization Period Expiration: 2024  Plan of Care Expiration: 2024  Progress Note Due: 2024  Visit # / Visits authorized: ,   FOTO: 10     FOTO Initial Evaluation: 53  FOTO 2nd F/U: NA  FOTO Discharge: NA     Precautions: Standard      Time In: 1000 am  Time Out: 1045 am  Total Appointment Time (timed & untimed codes): 45 minutes (20 minutes from Eduar Marion Functional Dry Needling)     PTA Visit #:        Subjective     Patient reports: pain is not to bad. He explained that the pain will definitely surface with prolonged sitting or lying down.      He was compliant with home exercise program.  Response to previous treatment: No change   Functional change: None    Pain: 10  Location: right back  and buttocks      Objective      Initial Evaluation       Lumbar AROM: Pain/Dysfunction with Movement:   Flexion: 35 degrees Increased R sided back and hip pain    Extension: 20 degrees Twinge in R buttock, but better than flexion   Right side bendin degrees Calf twinge    Left side bendin degrees     Right rotation: WNL degrees     Left rotation: WNL degrees     Extension + R Rotation: WNL degree     Extension + L Rotation: WNL degree        Myotome / MMT Right Left Pain/Dysfunction with Movement   L1,2- Hip Flexion (Femoral Nerve) 4+/5 5/5     L3,4- Knee Extension  (Femoral Nerve) 4+/5 ! 5/5 Pain R low back and buttock    L4,5- Ankle Dorsiflexion (Deep Fibular Nerve) 5/5 5/5     L5, S1- Ankle plantarflexion (Tibial Nerve) 5/5 5/5     Hip Abduction 4/5 4+/5     Knee Flexion 4+/5 5/5              Repeated Motions Positive/Negative   Flexion Worsened    Extension Improves      Neural Tension Positive/Negative   Passive SLR w/ DF  + on R                   Palpation:  - Tenderness to R lower back / paraspinals         Lumbar Manual Distraction:    Treatment     Jarod received the treatments listed below:      therapeutic exercises to develop strength, endurance, ROM, and flexibility for 25 minutes including:    Gastroc stretch 30s x 4  Prone on elbows 3 min   Prone press ups 2x10  Cat cow prayer 10x   Piriformis stretch 30s x 4   Supine Nerve Glides 2x10  (on heat)        HEP Review  Access Code: [email or web address]  Exercises  - Static Prone on Elbows  - 2 x daily - 7 x weekly - 2 sets - 3 minutes hold  - Prone Press Up  - 2 x daily - 7 x weekly - 2 sets - 10 reps - 3 seconds hold  - Seated Piriformis Stretch  - 2 x daily - 7 x weekly - 3 sets - 30 seconds hold  - Supine Bridge  - 2 x daily - 7 x weekly - 3 sets - 10 reps - 1 seconds hold  - Clamshell  - 2 x daily - 7 x weekly - 3 sets - 10 reps - 1 seconds hold  - Standing Lumbar Extension with Counter  - 2 x daily - 7 x weekly - 2 sets - 10 reps - 3 seconds hold  - Supine 90/90 Sciatic Nerve Glide with Knee Flexion/Extension  - 2 x daily - 7 x weekly - 2 sets - 10 reps - 2 seconds hold  - Seated Slump Nerve Glide  - 2 x daily - 7 x weekly - 2 sets - 10 reps - 2 seconds hold        Not today   Single knee to chest w/ SB   - 20x   Shuttle extension   - 0.5 cord   - 3x10   Shuttle dl press   - 3c 3x10   Supine piriformis stretch 30s x 4 (on heat)   Supine hamstring stretch 30s x 4 (on heat)   Supine ITB stretch 30s x 4 (on heat)   LTR x15 for 3s (on heat)   Bridging 3x10 (on heat)   SKTC 2x10 (on heat)   Clams GTB 3x10  Prone hip extensions  3#   - 2x10             manual therapy techniques: were applied to the: Lumbar spine or R gluteal region for 20 minutes, including: ( Performed by Eduar Marion DPT)       Palpation performed to determine muscular tightness and trigger points.       Functional Dry Needling to R gluteal region / piriformis with 4 75mm needles with mechanical winding for manual stimulation and estim for electrical stimulation. Patient had great muscular contractions from estim today.     Functional Dry Needling to R and L lumbar paraspinals at L4-L5 with 2 40mm needles on each side. Patient was hooked to estim on R side only and had great muscular contractions from needling. Patient had no adverse effects from today           Not today   Cupping to R gluteal region   Hypervolt Gun to R glute   Lumbar distraction   IASTM to R calf and R hamstring to tender areas                               neuromuscular re-education activities to improve: Coordination, Proprioception, Posture, and Motor Control for 0 minutes. The following activities were included:      PPT w/ biofeedback 2x10  PPT w/ biofeedback and kick outs 2x10         therapeutic activities to improve functional performance for 0  minutes, including:        Patient Education and Home Exercises       Education provided:   - HEP  - education on kneeling chair  - education on sitting/standing time frames at work      Written Home Exercises Provided: Patient instructed to cont prior HEP. Exercises were reviewed and Jarod was able to demonstrate them prior to the end of the session.  Jarod demonstrated good  understanding of the education provided. See Electronic Medical Record under Patient Instructions for exercises provided during therapy sessions    Assessment     Patient continues to present with R lower back pain and radicular symptoms down the R leg, but is improving. Functional dry needling seems to provide the best results. FDN performed by Eduar Marion DPT (see  note).    Jarod Is progressing well towards his goals.   Patient prognosis is Good.     Patient will continue to benefit from skilled outpatient physical therapy to address the deficits listed in the problem list box on initial evaluation, provide pt/family education and to maximize pt's level of independence in the home and community environment.     Patient's spiritual, cultural and educational needs considered and pt agreeable to plan of care and goals.     Anticipated barriers to physical therapy: None    Goals:   Short Term Goals (4 Weeks):  1. Pt will be compliant with HEP to supplement PT in decreasing pain with functional mobility.  2. Pt will perform pallof press with good control to demonstrate improved core strength.  3. Pt will improve lumbar ROM by 5-10 degrees in all planes to improve functional mobility.  4. Pt will improve impaired LE MMTs by 1/2 score to improve strength for functional tasks.  Long Term Goals (8 Weeks):   1. Pt will improve FOTO score to >/= 65 to decrease perceived limitation with maintaining/changing body position.   2. Pt will perform squat with good form to reduce risk of re injury with lifting.  3. Pt will improve impaired LE MMTs by 1 score to improve strength for functional tasks.  4. Pt will report no pain during lumbar ROM to promote functional mobility.         PLAN   Plan of care Certification: 4/17/2024 to 6/17/2024.     Outpatient Physical Therapy 1-2 times weekly for 8 weeks to include the following interventions: Cervical/Lumbar Traction, Manual Therapy, Moist Heat/ Ice, Neuromuscular Re-ed, Patient Education, Self Care, Therapeutic Activities, and FDN.        Tavo Bhat, PTA

## 2024-06-10 ENCOUNTER — PATIENT MESSAGE (OUTPATIENT)
Dept: INTERNAL MEDICINE | Facility: CLINIC | Age: 49
End: 2024-06-10
Payer: COMMERCIAL

## 2024-06-14 ENCOUNTER — CLINICAL SUPPORT (OUTPATIENT)
Dept: REHABILITATION | Facility: HOSPITAL | Age: 49
End: 2024-06-14
Payer: COMMERCIAL

## 2024-06-14 DIAGNOSIS — M53.86 DECREASED ROM OF LUMBAR SPINE: ICD-10-CM

## 2024-06-14 DIAGNOSIS — M54.41 ACUTE RIGHT-SIDED LOW BACK PAIN WITH RIGHT-SIDED SCIATICA: Primary | ICD-10-CM

## 2024-06-14 PROCEDURE — 97014 ELECTRIC STIMULATION THERAPY: CPT

## 2024-06-14 PROCEDURE — 97140 MANUAL THERAPY 1/> REGIONS: CPT

## 2024-06-14 NOTE — PROGRESS NOTES
[facility]   Physical Therapy Treatment Note      Name: Jarod Cruzac Sr.  Clinic Number: [id number]    Therapy Diagnosis:   Encounter Diagnoses   Name Primary?    Acute right-sided low back pain with right-sided sciatica Yes    Decreased ROM of lumbar spine        Physician: Luigi Tristan MD    Visit Date: 2024    Physician Orders: PT Eval and Treat   Medical Diagnosis from Referral:   M54.42 (ICD-10-CM) - Lumbago with sciatica, left side   M54.41 (ICD-10-CM) - Lumbago with sciatica, right side   G89.29 (ICD-10-CM) - Other chronic pain   Evaluation Date: 2024  Authorization Period Expiration: 2024  Plan of Care Expiration: 2024  Progress Note Due: 2024  Visit # / Visits authorized: , 10/12  FOTO: 11     FOTO Initial Evaluation: 53  FOTO 2nd F/U: NA  FOTO Discharge: NA     Precautions: Standard      Time In: 745  Time Out: 830  Total Appointment Time (timed & untimed codes): 45 minutes      PTA Visit #: 0/5       Subjective     Patient reports: he is feeling good as of the morning times, but starts to get sore at night time. Patient feels good if standing and avoiding sitting.       He was compliant with home exercise program.  Response to previous treatment: No change   Functional change: None    Pain: 4/10  Location: right back and buttocks      Objective      Initial Evaluation       Lumbar AROM: Pain/Dysfunction with Movement:   Flexion: 35 degrees Increased R sided back and hip pain    Extension: 20 degrees Twinge in R buttock, but better than flexion   Right side bendin degrees Calf twinge    Left side bendin degrees     Right rotation: WNL degrees     Left rotation: WNL degrees     Extension + R Rotation: WNL degree     Extension + L Rotation: WNL degree        Myotome / MMT Right Left Pain/Dysfunction with Movement   L1,2- Hip Flexion (Femoral Nerve) 4+/5 5/5     L3,4- Knee Extension (Femoral Nerve) 4+/5 ! 5/5 Pain R low back and  buttock    L4,5- Ankle Dorsiflexion (Deep Fibular Nerve) 5/5 5/5     L5, S1- Ankle plantarflexion (Tibial Nerve) 5/5 5/5     Hip Abduction 4/5 4+/5     Knee Flexion 4+/5 5/5              Repeated Motions Positive/Negative   Flexion Worsened    Extension Improves      Neural Tension Positive/Negative   Passive SLR w/ DF  + on R                   Palpation:  - Tenderness to R lower back / paraspinals         Lumbar Manual Distraction:    Treatment     Jarod received the treatments listed below:      therapeutic exercises to develop strength, endurance, ROM, and flexibility for 0 minutes including:      Gastroc stretch 30s x 4  Prone on elbows 3 min   Prone press ups 2x10  Cat cow prayer 10x   Piriformis stretch 30s x 4   Supine Nerve Glides 2x10  (on heat)        HEP Review  Access Code: [email or web address]  Exercises  - Static Prone on Elbows  - 2 x daily - 7 x weekly - 2 sets - 3 minutes hold  - Prone Press Up  - 2 x daily - 7 x weekly - 2 sets - 10 reps - 3 seconds hold  - Seated Piriformis Stretch  - 2 x daily - 7 x weekly - 3 sets - 30 seconds hold  - Supine Bridge  - 2 x daily - 7 x weekly - 3 sets - 10 reps - 1 seconds hold  - Clamshell  - 2 x daily - 7 x weekly - 3 sets - 10 reps - 1 seconds hold  - Standing Lumbar Extension with Counter  - 2 x daily - 7 x weekly - 2 sets - 10 reps - 3 seconds hold  - Supine 90/90 Sciatic Nerve Glide with Knee Flexion/Extension  - 2 x daily - 7 x weekly - 2 sets - 10 reps - 2 seconds hold  - Seated Slump Nerve Glide  - 2 x daily - 7 x weekly - 2 sets - 10 reps - 2 seconds hold        Not today   Single knee to chest w/ SB   - 20x   Shuttle extension   - 0.5 cord   - 3x10   Shuttle dl press   - 3c 3x10   Supine piriformis stretch 30s x 4 (on heat)   Supine hamstring stretch 30s x 4 (on heat)   Supine ITB stretch 30s x 4 (on heat)   LTR x15 for 3s (on heat)   Bridging 3x10 (on heat)   SKTC 2x10 (on heat)   Clams GTB 3x10  Prone hip extensions 3#   - 2x10             manual therapy  techniques: were applied to the: Lumbar spine or R gluteal region for 45 minutes, including:      Palpation performed to determine muscular tightness and trigger points.       Functional Dry Needling to R gluteal region / piriformis with 4 75mm needles with mechanical winding for manual stimulation and estim for electrical stimulation. Patient had great muscular contractions from estim today.     Functional Dry Needling to R and L lumbar paraspinals at L4-L5 with 2 40mm needles on each side. Patient was hooked to estim on R side only and had great muscular contractions from needling. Patient had no adverse effects from today     Cupping to R gluteal region     R hip distraction       Not today   Hypervolt Gun to R glute   Lumbar distraction   IASTM to R calf and R hamstring to tender areas         neuromuscular re-education activities to improve: Coordination, Proprioception, Posture, and Motor Control for 0 minutes. The following activities were included:      PPT w/ biofeedback 2x10  PPT w/ biofeedback and kick outs 2x10         therapeutic activities to improve functional performance for 0  minutes, including:        Patient Education and Home Exercises       Education provided:   - HEP  - education on kneeling chair  - education on sitting/standing time frames at work      Written Home Exercises Provided: Patient instructed to cont prior HEP. Exercises were reviewed and Jarod was able to demonstrate them prior to the end of the session.  Jarod demonstrated good  understanding of the education provided. See Electronic Medical Record under Patient Instructions for exercises provided during therapy sessions    Assessment     Patient continues to present with R lower back pain and radicular symptoms down the R leg, but is improving. Functional dry needling seems to provide the best results. Continuing 1x a week       Jarod Is progressing well towards his goals.   Patient prognosis is Good.     Patient will continue  to benefit from skilled outpatient physical therapy to address the deficits listed in the problem list box on initial evaluation, provide pt/family education and to maximize pt's level of independence in the home and community environment.     Patient's spiritual, cultural and educational needs considered and pt agreeable to plan of care and goals.     Anticipated barriers to physical therapy: None    Goals:   Short Term Goals (4 Weeks):  1. Pt will be compliant with HEP to supplement PT in decreasing pain with functional mobility.  2. Pt will perform pallof press with good control to demonstrate improved core strength.  3. Pt will improve lumbar ROM by 5-10 degrees in all planes to improve functional mobility.  4. Pt will improve impaired LE MMTs by 1/2 score to improve strength for functional tasks.  Long Term Goals (8 Weeks):   1. Pt will improve FOTO score to >/= 65 to decrease perceived limitation with maintaining/changing body position.   2. Pt will perform squat with good form to reduce risk of re injury with lifting.  3. Pt will improve impaired LE MMTs by 1 score to improve strength for functional tasks.  4. Pt will report no pain during lumbar ROM to promote functional mobility.         PLAN   Plan of care Certification: 4/17/2024 to 6/17/2024.     Outpatient Physical Therapy 1-2 times weekly for 8 weeks to include the following interventions: Cervical/Lumbar Traction, Manual Therapy, Moist Heat/ Ice, Neuromuscular Re-ed, Patient Education, Self Care, Therapeutic Activities, and FDN.        Eduar Marion, PT

## 2024-06-21 ENCOUNTER — CLINICAL SUPPORT (OUTPATIENT)
Dept: REHABILITATION | Facility: HOSPITAL | Age: 49
End: 2024-06-21
Payer: COMMERCIAL

## 2024-06-21 DIAGNOSIS — M54.41 ACUTE RIGHT-SIDED LOW BACK PAIN WITH RIGHT-SIDED SCIATICA: Primary | ICD-10-CM

## 2024-06-21 DIAGNOSIS — M53.86 DECREASED ROM OF LUMBAR SPINE: ICD-10-CM

## 2024-06-21 PROCEDURE — 97014 ELECTRIC STIMULATION THERAPY: CPT

## 2024-06-21 PROCEDURE — 97140 MANUAL THERAPY 1/> REGIONS: CPT

## 2024-06-21 PROCEDURE — 97110 THERAPEUTIC EXERCISES: CPT

## 2024-06-21 NOTE — PROGRESS NOTES
[facility]   Physical Therapy Treatment Note / UPDATED plan of care      Name: Jarod Arias .  Clinic Number: [id number]    Therapy Diagnosis:   Encounter Diagnoses   Name Primary?    Acute right-sided low back pain with right-sided sciatica Yes    Decreased ROM of lumbar spine          Physician: Luigi Tristan MD    Visit Date: 2024    Physician Orders: PT Eval and Treat   Medical Diagnosis from Referral:   M54.42 (ICD-10-CM) - Lumbago with sciatica, left side   M54.41 (ICD-10-CM) - Lumbago with sciatica, right side   G89.29 (ICD-10-CM) - Other chronic pain   Evaluation Date: 2024  Authorization Period Expiration: 2024  Plan of Care Expiration: 2024  Progress Note Due: 2024  Visit # / Visits authorized: ,   FOTO: 12     FOTO Initial Evaluation: 53  FOTO 2nd F/U: NA  FOTO Discharge: NA     Precautions: Standard      Time In: 915  Time Out: 1000  Total Appointment Time (timed & untimed codes): 45 minutes      PTA Visit #: 0/5       Subjective     Patient reports: he feels that he is hitting a plateau. Patient is making an appointment for a follow up with his doctor. Patient may come to his remaining visits for maintenance.       He was compliant with home exercise program.  Response to previous treatment: No change   Functional change: None    Pain: 4/10  Location: right back and buttocks      Objective      2024       Lumbar AROM: Pain/Dysfunction with Movement:   Flexion: 65 degrees Increased R sided back and hip pain    Extension: 20 degrees    Right side bendin degrees    Left side bendin degrees     Right rotation: WNL degrees     Left rotation: WNL degrees     Extension + R Rotation: WNL degree     Extension + L Rotation: WNL degree        Myotome / MMT Right Left Pain/Dysfunction with Movement   L1,2- Hip Flexion (Femoral Nerve) 4+/5 5/5     L3,4- Knee Extension (Femoral Nerve) 5/5  5/5    L4,5- Ankle Dorsiflexion (Deep  Fibular Nerve) 5/5 5/5     L5, S1- Ankle plantarflexion (Tibial Nerve) 5/5 5/5     Hip Abduction 4/5 4+/5     Knee Flexion 4+/5 5/5              Repeated Motions Positive/Negative   Flexion Worsened    Extension Improves      Neural Tension Positive/Negative   Passive SLR w/ DF  + on R                   Palpation:  - Tenderness to R lower back / paraspinals         Lumbar Manual Distraction:    Treatment     Jarod received the treatments listed below:      therapeutic exercises/objective  to develop strength, endurance, ROM, and flexibility for 10 minutes including:          Not today   Gastroc stretch 30s x 4  Prone on elbows 3 min   Prone press ups 2x10  Cat cow prayer 10x   Piriformis stretch 30s x 4   Supine Nerve Glides 2x10  (on heat)        HEP Review  Access Code: [email or web address]  Exercises  - Static Prone on Elbows  - 2 x daily - 7 x weekly - 2 sets - 3 minutes hold  - Prone Press Up  - 2 x daily - 7 x weekly - 2 sets - 10 reps - 3 seconds hold  - Seated Piriformis Stretch  - 2 x daily - 7 x weekly - 3 sets - 30 seconds hold  - Supine Bridge  - 2 x daily - 7 x weekly - 3 sets - 10 reps - 1 seconds hold  - Clamshell  - 2 x daily - 7 x weekly - 3 sets - 10 reps - 1 seconds hold  - Standing Lumbar Extension with Counter  - 2 x daily - 7 x weekly - 2 sets - 10 reps - 3 seconds hold  - Supine 90/90 Sciatic Nerve Glide with Knee Flexion/Extension  - 2 x daily - 7 x weekly - 2 sets - 10 reps - 2 seconds hold  - Seated Slump Nerve Glide  - 2 x daily - 7 x weekly - 2 sets - 10 reps - 2 seconds hold        Not today   Single knee to chest w/ SB   - 20x   Shuttle extension   - 0.5 cord   - 3x10   Shuttle dl press   - 3c 3x10   Supine piriformis stretch 30s x 4 (on heat)   Supine hamstring stretch 30s x 4 (on heat)   Supine ITB stretch 30s x 4 (on heat)   LTR x15 for 3s (on heat)   Bridging 3x10 (on heat)   SKTC 2x10 (on heat)   Clams GTB 3x10  Prone hip extensions 3#   - 2x10             manual therapy techniques: were  applied to the: Lumbar spine or R gluteal region for 35 minutes, including:      Palpation performed to determine muscular tightness and trigger points.       Functional Dry Needling to R gluteal region / piriformis with 4 75mm needles with mechanical winding for manual stimulation and estim for electrical stimulation. Patient had great muscular contractions from estim today.     Functional Dry Needling to R and L lumbar paraspinals at L4-L5 with 2 40mm needles on each side. Patient was hooked to estim on R side only and had great muscular contractions from needling. Patient had no adverse effects from today     Cupping to R gluteal region     R hip distraction       Not today   Hypervolt Gun to R glute   Lumbar distraction   IASTM to R calf and R hamstring to tender areas         neuromuscular re-education activities to improve: Coordination, Proprioception, Posture, and Motor Control for 0 minutes. The following activities were included:      PPT w/ biofeedback 2x10  PPT w/ biofeedback and kick outs 2x10         therapeutic activities to improve functional performance for 0  minutes, including:        Patient Education and Home Exercises       Education provided:   - HEP  - education on kneeling chair  - education on sitting/standing time frames at work      Written Home Exercises Provided: Patient instructed to cont prior HEP. Exercises were reviewed and Jarod was able to demonstrate them prior to the end of the session.  Jarod demonstrated good  understanding of the education provided. See Electronic Medical Record under Patient Instructions for exercises provided during therapy sessions    Assessment     Patient continues to present with R lower back pain and radicular symptoms down the R leg, but is improving. Functional dry needling seems to provide the best results. Continuing 1x a week. Patient may discharge next session due to hitting a plateau       Jarod Is progressing well towards his goals.   Patient  prognosis is Good.     Patient will continue to benefit from skilled outpatient physical therapy to address the deficits listed in the problem list box on initial evaluation, provide pt/family education and to maximize pt's level of independence in the home and community environment.     Patient's spiritual, cultural and educational needs considered and pt agreeable to plan of care and goals.     Anticipated barriers to physical therapy: None    Goals:   Short Term Goals (4 Weeks):  1. Pt will be compliant with HEP to supplement PT in decreasing pain with functional mobility.  2. Pt will perform pallof press with good control to demonstrate improved core strength.  3. Pt will improve lumbar ROM by 5-10 degrees in all planes to improve functional mobility.  4. Pt will improve impaired LE MMTs by 1/2 score to improve strength for functional tasks.  Long Term Goals (8 Weeks):   1. Pt will improve FOTO score to >/= 65 to decrease perceived limitation with maintaining/changing body position.   2. Pt will perform squat with good form to reduce risk of re injury with lifting.  3. Pt will improve impaired LE MMTs by 1 score to improve strength for functional tasks.  4. Pt will report no pain during lumbar ROM to promote functional mobility.         PLAN   Plan of care Certification: extend to 8/1/2024      Outpatient Physical Therapy 1-2 times weekly for 8 weeks to include the following interventions: Cervical/Lumbar Traction, Manual Therapy, Moist Heat/ Ice, Neuromuscular Re-ed, Patient Education, Self Care, Therapeutic Activities, and FDN.        Eduar Marion, PT

## 2024-07-23 ENCOUNTER — OFFICE VISIT (OUTPATIENT)
Dept: HEPATOLOGY | Facility: CLINIC | Age: 49
End: 2024-07-23
Payer: COMMERCIAL

## 2024-07-23 DIAGNOSIS — R16.2 HEPATOSPLENOMEGALY: ICD-10-CM

## 2024-07-23 DIAGNOSIS — K76.0 FATTY LIVER: Primary | ICD-10-CM

## 2024-07-23 DIAGNOSIS — R74.01 ELEVATED TRANSAMINASE LEVEL: ICD-10-CM

## 2024-07-23 NOTE — PROGRESS NOTES
[facility] - New Patient     REFERRING PROVIDER: Dr. Alex Schofield  PCP: Alex Schofield MD    Chief Complaint: Fatty liver, elevated liver enzymes       HISTORY     This is a 48 y.o. male referred for evaluation of above.    He has had 2 episodes over the last year of fever and abdominal pain (May 2023 and Feb 2024). Liver enzymes/LFTs noted to be high at this time, mainly transaminases. Both times treated with Cipro. Enzymes have quickly normalized with resolution of symptoms.     This prompted CTs/US, which have shown hepatosplenomegaly and hepatic steatosis. No acute GI process noted.       He did see [facility] and had unrevealing EGD. Has not had colonoscopy.     Review of latest labs:  - Transaminases: WNL  - Alk phos: WNL  - Synthetic liver function: WNL  - Platelets: WNL    Workup thus far:  Serologies:   Lab Results   Component Value Date    HEPBSAG Non-reactive 02/20/2024    HEPBIGM Non-reactive 02/20/2024    HEPCAB Non-reactive 02/20/2024    HEPAIGM Non-reactive 02/20/2024      Risk factors for fatty liver:   Weight -- BMI 25  Weight is down overall, 190s -- 170s. Notes that weight often fluctuates ~20 lb.    Limits sugar and fried foods                     Dyslipidemia -- mildly elevated in previous years                               Insulin resistance / diabetes -- no       Hypertension -- no   Alcohol use -- 1-2 drinks/week. Minimal for the last few months.    Family history:  +grandfather with liver issues, ?liver CA  +cardiac disease, father with MI      Meds/supplements:  -Rx: allopurinol since ~2017  -OTC, herbs/vitamins: none  No recent medication changes.     Social history:  Occupation:    Exercise: not strenuous; no myalgias   Denies illicit drug use.    No symptoms of hepatic decompensation including jaundice, ascites, cognitive problems to suggest hepatic encephalopathy, or GI bleeding.          Past medical history, surgical history, problem list, family history,  social history, allergies: Reviewed and updated in the appropriate section of the electronic medical record.      Current Outpatient Medications   Medication Sig Dispense Refill    acetaminophen (TYLENOL) 500 MG tablet Take 1-2 tablets (500-1,000 mg total) by mouth 3 (three) times daily as needed for Pain.  0    allopurinoL (ZYLOPRIM) 300 MG tablet TAKE 1 TABLET(300 MG) BY MOUTH EVERY DAY 90 tablet 0    ALPRAZolam (XANAX) 0.5 MG tablet Take 0.5 mg by mouth daily as needed.      cetirizine (ZYRTEC) 10 MG tablet Take 10 mg by mouth once daily.      diphenhydrAMINE (BENADRYL) 25 mg capsule Take 25 mg by mouth every 6 (six) hours as needed for Itching.      fluticasone propionate (FLONASE) 50 mcg/actuation nasal spray 1 spray by Each Nare route once daily.      gabapentin (NEURONTIN) 300 MG capsule Take 1 capsule (300 mg total) by mouth every evening. In 1 wk, if no relief, increase to twice daily.In another wk, may increase to 3 times. 60 capsule 3    meloxicam (MOBIC) 7.5 MG tablet Take 1 tablet (7.5 mg total) by mouth once daily. In 1 week, if no relief, may increase dose to two tablets once daily. 30 tablet 2    pseudoephedrine (SUDAFED) 30 MG tablet Take 30 mg by mouth every 4 (four) hours as needed for Congestion.       No current facility-administered medications for this visit.     Medication list reviewed and updated.      Review of Systems - as per HPI  Constitutional: Negative for unexpected weight change.   Respiratory: Negative for shortness of breath.    Cardiovascular: Negative for leg swelling.  Gastrointestinal: Negative for abdominal distention or abdominal pain. Negative for melena or hematemesis.  Musculoskeletal: Negative for myalgias.    Skin: Negative for jaundice or itching.  Neurological: Negative for confusion or slowed mentation. Negative for tremors.   Hematological: Does not bruise/bleed easily.       Physical Exam - limited by virtual visit  Constitutional: No distress. Alert and  "oriented.  Pulmonary/Chest: No respiratory distress.   Skin: No jaundice.   Psychiatric: Normal mood and affect. Speech, behavior, and thought content normal.        LABS & DIAGNOSTIC STUDIES     I have personally reviewed pertinent laboratory findings:    Lab Results   Component Value Date    ALT 12 04/25/2024    AST 15 04/25/2024    ALKPHOS 56 04/25/2024    BILITOT 0.9 04/25/2024    ALBUMIN 4.3 04/25/2024       Lab Results   Component Value Date    WBC 3.87 (L) 02/20/2024    HGB 15.1 02/20/2024    HCT 45.3 02/20/2024    MCV 88 02/20/2024     02/20/2024       Lab Results   Component Value Date     02/20/2024    K 4.4 02/20/2024    BUN 9 02/20/2024    CREATININE 1.0 02/20/2024    ESTGFRAFRICA >60.0 07/22/2022    EGFRNONAA >60.0 07/22/2022       Lab Results   Component Value Date    HEPBSAG Non-reactive 02/20/2024    HEPCAB Non-reactive 02/20/2024       No results found for: "AFP"      I have personally reviewed the following result reports:  Abdominal US - 2/20/24  CT - 2/16/24    ASSESSMENT & PLAN     48 y.o. male with:    1. Fatty liver    2. Elevated transaminase level    3. Hepatosplenomegaly        Elevated transaminases noted during periods of illness; liver enzymes currently normal.     Recommendations:   Obtain Fibroscan for fibrosis and steatosis staging.  Check iron studies, HgbA1c    Weight is down ~20 lb right now, encouraged to maintain this  Diet should be low in carbohydrates and added sugars. Recommend increasing protein and fiber intake.   150 min/week of moderate exercise (aerobic + resistance), as tolerated.   Maintain good control of blood pressure, cholesterol, and blood sugar  If no advanced fibrosis, should limit alcohol to max 2 standard drinks/day. Do not recommend daily alcohol use or drinking most days per week.   Recommend vaccination for hepatitis A/B if not immune.  Elevated liver tests correlate with periods of illness, ?viral. If he were to have another episode of " abdominal pain and fever, would recommend further workup with GI       Orders Placed This Encounter   Procedures    FibroScan Transplant Hepatology(Vibration Controlled Transient Elastography)    Ferritin    Iron and TIBC    Hemoglobin A1C    Hepatitis A antibody, IgG    Hepatitis B Core Antibody, Total    Hepatitis B Surface Ab, Qualitative       Return to clinic any time after labs/Fibroscan.       Thank you for allowing me to participate in the care of Jarod Smithben Boswell.    Homa Mc, FNP-C  Hepatology          The patient location is: [place], [place]  The chief complaint leading to consultation is: Elevated liver enzymes, fatty liver    Visit type: audiovisual    Face to Face time with patient: 23 min  45 minutes of total time spent on the encounter, which includes face to face time and non-face to face time preparing to see the patient (eg, review of tests), Obtaining and/or reviewing separately obtained history, Documenting clinical information in the electronic or other health record, Independently interpreting results (not separately reported) and communicating results to the patient/family/caregiver, or Care coordination (not separately reported).     Each patient to whom he or she provides medical services by telemedicine is:  (1) informed of the relationship between the physician and patient and the respective role of any other health care provider with respect to management of the patient; and (2) notified that he or she may decline to receive medical services by telemedicine and may withdraw from such care at any time.

## 2024-07-24 NOTE — PATIENT INSTRUCTIONS
Fibroscan to assess for liver fibrosis/damage from fatty liver          FATTY LIVER EDUCATION:  Lifestyle changes can help to reverse fatty liver and prevent liver damage:  1. Weight loss goal of 10-15 lbs    *Weight loss of 5% has been shown to reduce fat in the liver  *Weight loss of 7-10% has been shown to improve fatty liver, inflammation from fatty liver, and liver fibrosis (scarring/damage)    2. Decreasing daily calories is recommended for weight loss. Try to limit carbohydrate intake to LESS than 30-45 grams of carbs with a meal and LESS than 5-10 grams of carbs with a snack. Avoid drinking beverages with sugar/carbohydrates. Meeting with a dietician or using one of the weight loss apps below can be helpful to determine individualized calorie goals and add up carbs throughout the day. Look for snacks high in protein, low in carbohydrates/sugar.    3. Exercise as tolerated. Studies have shown that exercise improves fatty liver even without weight loss.     4. Recommend good control of blood pressure, cholesterol, and blood sugar levels as these are risk factors for fatty liver. Cholesterol lowering medications including statins are typically safe and beneficial (even if liver enzymes are elevated).    5. Limit alcohol consumption, no more than 2 serving(s) of alcohol in any day (1 serving is 5 ounces of wine, 12 ounces of beer, or 1.5 ounces of liquor). Do not recommend daily alcohol use or drinking alcohol most days per week.    In some people, fatty liver can progress to steatohepatitis (inflammatory fatty liver). This can cause liver scarring or damage (fibrosis) and possibly cirrhosis. Cirrhosis increases risk of liver cancer and liver failure. Lifestyle changes now can help to decrease this risk.         Additional Resources:    Websites with information about fatty liver and inflammation related to fatty liver (CROWELL): www.nashtruth.com and www.nashactually.com   [facility]: Non-Alcoholic Fatty Liver  Disease (NAFLD)    Facebook support group with tips and recipes:   Non-Alcoholic Fatty Liver Disease (NAFLD) Diet & Nutrition Support     Can download Smart Plate Pal or Lose It emmy to add up your calories and carbohydrates throughout the day.      Try www.Patient Feed.Meaningo for recipes.    If interested in seeing a dietician to create a weight loss plan, contact the dietician team at [facility]: [email or web address].  You can also call to schedule a consult with a dietician: [phone]  *Virtual visits are available with one of our dieticians.     If you have diabetes or high blood pressure, you can meet with a Health  through [facility] program.    Let us know if you are interested in a referral to [facility].

## 2024-07-26 ENCOUNTER — OFFICE VISIT (OUTPATIENT)
Dept: PHYSICAL MEDICINE AND REHAB | Facility: CLINIC | Age: 49
End: 2024-07-26
Payer: COMMERCIAL

## 2024-07-26 ENCOUNTER — PATIENT MESSAGE (OUTPATIENT)
Dept: HEPATOLOGY | Facility: CLINIC | Age: 49
End: 2024-07-26

## 2024-07-26 ENCOUNTER — PROCEDURE VISIT (OUTPATIENT)
Dept: HEPATOLOGY | Facility: CLINIC | Age: 49
End: 2024-07-26
Payer: COMMERCIAL

## 2024-07-26 ENCOUNTER — LAB VISIT (OUTPATIENT)
Dept: LAB | Facility: HOSPITAL | Age: 49
End: 2024-07-26
Payer: COMMERCIAL

## 2024-07-26 VITALS — BODY MASS INDEX: 25.41 KG/M2 | HEIGHT: 70 IN | WEIGHT: 177.5 LBS | OXYGEN SATURATION: 98 %

## 2024-07-26 DIAGNOSIS — R74.01 ELEVATED TRANSAMINASE LEVEL: ICD-10-CM

## 2024-07-26 DIAGNOSIS — M47.816 LUMBAR FACET ARTHROPATHY: ICD-10-CM

## 2024-07-26 DIAGNOSIS — M51.36 DDD (DEGENERATIVE DISC DISEASE), LUMBAR: ICD-10-CM

## 2024-07-26 DIAGNOSIS — M43.16 SPONDYLOLISTHESIS OF LUMBAR REGION: ICD-10-CM

## 2024-07-26 DIAGNOSIS — K76.0 FATTY LIVER: ICD-10-CM

## 2024-07-26 DIAGNOSIS — M54.41 CHRONIC BILATERAL LOW BACK PAIN WITH BILATERAL SCIATICA: Primary | ICD-10-CM

## 2024-07-26 DIAGNOSIS — G89.29 CHRONIC BILATERAL LOW BACK PAIN WITH BILATERAL SCIATICA: Primary | ICD-10-CM

## 2024-07-26 DIAGNOSIS — M54.42 CHRONIC BILATERAL LOW BACK PAIN WITH BILATERAL SCIATICA: Primary | ICD-10-CM

## 2024-07-26 LAB
ESTIMATED AVG GLUCOSE: 103 MG/DL (ref 68–131)
FERRITIN SERPL-MCNC: 156 NG/ML (ref 20–300)
HAV IGG SER QL IA: NORMAL
HBA1C MFR BLD: 5.2 % (ref 4–5.6)
HBV CORE AB SERPL QL IA: NORMAL
HBV SURFACE AB SER-ACNC: <3 MIU/ML
HBV SURFACE AB SER-ACNC: NORMAL M[IU]/ML
IRON SERPL-MCNC: 118 UG/DL (ref 45–160)
SATURATED IRON: 39 % (ref 20–50)
TOTAL IRON BINDING CAPACITY: 300 UG/DL (ref 250–450)
TRANSFERRIN SERPL-MCNC: 203 MG/DL (ref 200–375)

## 2024-07-26 PROCEDURE — 86790 VIRUS ANTIBODY NOS: CPT | Performed by: NURSE PRACTITIONER

## 2024-07-26 PROCEDURE — 86706 HEP B SURFACE ANTIBODY: CPT | Mod: 91 | Performed by: NURSE PRACTITIONER

## 2024-07-26 PROCEDURE — 82728 ASSAY OF FERRITIN: CPT | Performed by: NURSE PRACTITIONER

## 2024-07-26 PROCEDURE — 36415 COLL VENOUS BLD VENIPUNCTURE: CPT | Performed by: NURSE PRACTITIONER

## 2024-07-26 PROCEDURE — 83540 ASSAY OF IRON: CPT | Performed by: NURSE PRACTITIONER

## 2024-07-26 PROCEDURE — 83036 HEMOGLOBIN GLYCOSYLATED A1C: CPT | Performed by: NURSE PRACTITIONER

## 2024-07-26 PROCEDURE — 99999 PR PBB SHADOW E&M-EST. PATIENT-LVL III: CPT | Mod: PBBFAC,,, | Performed by: PHYSICAL MEDICINE & REHABILITATION

## 2024-07-26 PROCEDURE — 86704 HEP B CORE ANTIBODY TOTAL: CPT | Performed by: NURSE PRACTITIONER

## 2024-07-26 NOTE — PROGRESS NOTES
Subjective:       Patient ID: Jarod Arias Sr. is a 48 y.o. male.    Chief Complaint: No chief complaint on file.      HPI    Mr. Amaya is a 48-year-old male with past medical history of gouty arthritis.  He presented to the Physical Medicine Clinic on 4/3/2024 for chronic low back pain with bilateral radiculopathy.  X-rays of the lumbar spine were ordered.  He was started on meloxicam, gabapentin and p.r.n. Tylenol.  He was referred to physical therapy.    His lumbar x-rays showed disc space narrowing at L3-4 and L5-S1, mild listhesis L5/S1, and facet arthropathy.  The patient participated with a course of physical therapy starting on 4/17/2024 and ending on 6/21/2024.    The patient is coming to the clinic for follow-up.  His back pain was under good control after therapy.  However, about 2 weeks ago, he went for a trip to [facility] where he stayed for a long time in the plane and had to walk for a long time in the city.  His back pain flared up.  He started again taking meloxicam which helped to some extent.  His low back pain has been getting better.  It is an intermittent soreness in the lower lumbar spine and across his lower spine to both buttock regions.  He has occasional shooting pain to the right dorsum.  He has occasional left heel numbness.  His pain is aggravated by prolonged sitting, getting up from a seated position, heavy lifting and squatting.  It is better with local heat and a short walk.  His maximum pain is 5/10 and minimum 3/10.  Today it is 5/10.  He denies any lower extremity weakness.  He denies any bowel or bladder incontinence.  He denies any leg claudications although.    He is currently taking:  Meloxicam 7.5 mg p.o. daily  Gabapentin 300 mg capsules, 2 capsules in the evening  Tylenol 500 mg p.r.n. but infrequently.  He is currently being worked up for elevated liver function tests.  He is planning to contact Physical therapy to resume it.      Past Medical History:    Diagnosis Date    Allergy     Elevated blood pressure reading without diagnosis of hypertension     Gout     Left wrist fracture         Review of patient's allergies indicates:   Allergen Reactions    No known drug allergies         Review of Systems   Constitutional:  Negative for chills and fever.   Eyes:  Negative for visual disturbance.   Respiratory:  Negative for shortness of breath.    Cardiovascular:  Negative for chest pain.   Gastrointestinal:  Negative for blood in stool, constipation, nausea and vomiting.   Genitourinary:  Negative for difficulty urinating.   Musculoskeletal:  Positive for back pain. Negative for arthralgias, gait problem and neck pain.   Neurological:  Negative for dizziness, weakness and headaches.   Psychiatric/Behavioral:  Negative for behavioral problems and sleep disturbance.              Objective:      Physical Exam  Vitals reviewed.   Constitutional:       General: He is not in acute distress.     Appearance: He is well-developed.   HENT:      Head: Normocephalic and atraumatic.   Musculoskeletal:      Cervical back: Normal range of motion.      Comments:   BLE:  ROM:   RLE: full.   LLE: full.  Knee crepitus:   RLE: -ve.   LLE: -ve.   Strength:    RLE: 5/5 at hip flexion, 5 knee extension, 5 ankle DF, 5 PF, 5 EHL.   LLE: 5/5 at hip flexion, 5 knee extension, 5 ankle DF, 5 PF, 5 EHL.  Sensation to pinprick:     RLE: intact.      LLE: intact.   DTR:     RLE: +2 knee, +1 ankle.    LLE: +2 knee, +1 ankle.  Clonus:    Rt ankle: -ve.    Lt ankle: -ve.  SLR:      RLE: +ve at 30 degrees. +ve hamstring tightness.     LLE: -ve at 60 degrees.  +ve hamstring tightness.    +ve tenderness over right lumbar spine.    Gait: WNL     Skin:     General: Skin is warm.   Neurological:      Mental Status: He is alert.   Psychiatric:         Behavior: Behavior normal.         IMAGING STUDIES:    XR LUMBAR SPINE AP AND LAT WITH FLEX/EXT (4/3/2024):     CLINICAL HISTORY:  Lumbago with sciatica, left  side     TECHNIQUE:  AP and lateral views as well as lateral flexion and extension images are performed through the lumbar spine.     COMPARISON:  None     FINDINGS:  Mild levoconvex curvature lumbar spine.  Disc space narrowing L3-4 and L5-S1.  Few mm retrolisthesis L5 on S1.  No significant motion with flexion extension.  Facet arthropathy lumbosacral level.     Impression:     See above         Assessment:       1. Chronic bilateral low back pain with bilateral sciatica    2. DDD (degenerative disc disease), lumbar    3. Lumbar facet arthropathy    4. Spondylolisthesis of lumbar region        Plan:         - X-Ray findings were discussed with the patient.  - Continue meloxicam (MOBIC) 7.5 MG tablet; Take 1 tablet (7.5 mg total) by mouth once daily.If no relief, may increase dose to two tablets once daily.  - Continue gabapentin (NEURONTIN) 300 MG capsule; Take 2 capsules (600 mg total) by mouth every evening.-  - Restart Physical therapy, followed by a regular home exercise program.  - Follow up in about 4 months (around 11/26/2024).      This was a 33 minute visit, 50% of which was spent educating the patient about the diagnosis and the treatment plan.    This note was partly generated with [employer] voice recognition software. I apologize for any possible typographical errors.

## 2024-07-26 NOTE — PROCEDURES
FibroScan Transplant Hepatology(Vibration Controlled Transient Elastography)    Date/Time: 7/26/2024 10:30 AM    Performed by: Homa Mc NP  Authorized by: Homa Mc NP    Diagnosis:  NAFLD    Probe:  M    Universal Protocol: Patient's identity, procedure and site were verified, confirmatory pause was performed.  Discussed procedure including risks and potential complications.  Questions answered.  Patient verbalizes understanding and wishes to proceed with VCTE.     Procedure: After providing explanations of the procedure, patient was placed in the supine position with right arm in maximum abduction to allow optimal exposure of right lateral abdomen.  Patient was briefly assessed, Testing was performed in the mid-axillary location, 50Hz Shear Wave pulses were applied and the resulting Shear Wave and Propagation Speed detected with a 3.5 MHz ultrasonic signal, using the FibroScan probe, Skin to liver capsule distance and liver parenchyma were accessed during the entire examination with the FibroScan probe, Patient was instructed to breathe normally and to abstain from sudden movements during the procedure, allowing for random measurements of liver stiffness. At least 10 Shear Waves were produced, Individual measurements of each Shear Wave were calculated.  Patient tolerated the procedure well with no complications.  Meets discharge criteria as was dismissed.  Rates pain 0 out of 10.  Patient will follow up with ordering provider to review results.    Findings  Median liver stiffness score:  5.9  CAP Reading: dB/m:  228    IQR/med %:  11  Interpretation  Fibrosis interpretation is based on medial liver stiffness - Kilopascal (kPa).    Fibrosis Stage:  F 0-1  Steatosis interpretation is based on controlled attenuation parameter - (dB/m).    Steatosis Grade:  <S1

## 2024-08-07 DIAGNOSIS — M54.41 CHRONIC BILATERAL LOW BACK PAIN WITH BILATERAL SCIATICA: ICD-10-CM

## 2024-08-07 DIAGNOSIS — M54.42 CHRONIC BILATERAL LOW BACK PAIN WITH BILATERAL SCIATICA: ICD-10-CM

## 2024-08-07 DIAGNOSIS — G89.29 CHRONIC BILATERAL LOW BACK PAIN WITH BILATERAL SCIATICA: ICD-10-CM

## 2024-08-07 RX ORDER — MELOXICAM 7.5 MG/1
7.5 TABLET ORAL DAILY
Qty: 30 TABLET | Refills: 2 | Status: SHIPPED | OUTPATIENT
Start: 2024-08-07

## 2024-09-10 ENCOUNTER — PATIENT MESSAGE (OUTPATIENT)
Dept: INTERNAL MEDICINE | Facility: CLINIC | Age: 49
End: 2024-09-10
Payer: COMMERCIAL

## 2024-09-10 DIAGNOSIS — M10.9 GOUT, ARTHRITIS: ICD-10-CM

## 2024-09-10 RX ORDER — ALLOPURINOL 300 MG/1
TABLET ORAL
Qty: 90 TABLET | Refills: 3 | Status: SHIPPED | OUTPATIENT
Start: 2024-09-10

## 2024-09-10 NOTE — TELEPHONE ENCOUNTER
Lov 3/12/24  Requesting refill. Rx previously prescribed but Rheumatology but states he was informed that he no longer needs to f/u with that dept.

## 2024-09-10 NOTE — TELEPHONE ENCOUNTER
No care due was identified.  [facility] Embedded Care Due Messages. Reference number: [id number].   9/10/2024 11:06:32 AM CDT

## 2024-09-24 ENCOUNTER — PATIENT MESSAGE (OUTPATIENT)
Dept: PHYSICAL MEDICINE AND REHAB | Facility: CLINIC | Age: 49
End: 2024-09-24
Payer: COMMERCIAL

## 2024-09-24 DIAGNOSIS — M47.816 LUMBAR FACET ARTHROPATHY: ICD-10-CM

## 2024-09-24 DIAGNOSIS — M43.16 SPONDYLOLISTHESIS OF LUMBAR REGION: ICD-10-CM

## 2024-09-24 DIAGNOSIS — G89.29 CHRONIC BILATERAL LOW BACK PAIN WITH BILATERAL SCIATICA: ICD-10-CM

## 2024-09-24 DIAGNOSIS — G89.29 CHRONIC BILATERAL LOW BACK PAIN WITH BILATERAL SCIATICA: Primary | ICD-10-CM

## 2024-09-24 DIAGNOSIS — M54.41 CHRONIC BILATERAL LOW BACK PAIN WITH BILATERAL SCIATICA: Primary | ICD-10-CM

## 2024-09-24 DIAGNOSIS — M54.42 CHRONIC BILATERAL LOW BACK PAIN WITH BILATERAL SCIATICA: Primary | ICD-10-CM

## 2024-09-24 DIAGNOSIS — M51.36 DDD (DEGENERATIVE DISC DISEASE), LUMBAR: ICD-10-CM

## 2024-09-24 DIAGNOSIS — M54.42 CHRONIC BILATERAL LOW BACK PAIN WITH BILATERAL SCIATICA: ICD-10-CM

## 2024-09-24 DIAGNOSIS — M54.41 CHRONIC BILATERAL LOW BACK PAIN WITH BILATERAL SCIATICA: ICD-10-CM

## 2024-09-24 RX ORDER — GABAPENTIN 300 MG/1
300 CAPSULE ORAL NIGHTLY
Qty: 60 CAPSULE | Refills: 3 | Status: SHIPPED | OUTPATIENT
Start: 2024-09-24 | End: 2024-10-24

## 2024-10-02 ENCOUNTER — CLINICAL SUPPORT (OUTPATIENT)
Dept: REHABILITATION | Facility: HOSPITAL | Age: 49
End: 2024-10-02
Attending: PHYSICAL MEDICINE & REHABILITATION
Payer: COMMERCIAL

## 2024-10-02 DIAGNOSIS — M51.369 DDD (DEGENERATIVE DISC DISEASE), LUMBAR: ICD-10-CM

## 2024-10-02 DIAGNOSIS — M54.41 ACUTE RIGHT-SIDED LOW BACK PAIN WITH RIGHT-SIDED SCIATICA: Primary | ICD-10-CM

## 2024-10-02 DIAGNOSIS — M54.42 CHRONIC BILATERAL LOW BACK PAIN WITH BILATERAL SCIATICA: ICD-10-CM

## 2024-10-02 DIAGNOSIS — M53.86 DECREASED ROM OF LUMBAR SPINE: ICD-10-CM

## 2024-10-02 DIAGNOSIS — M54.41 CHRONIC BILATERAL LOW BACK PAIN WITH BILATERAL SCIATICA: ICD-10-CM

## 2024-10-02 DIAGNOSIS — G89.29 CHRONIC BILATERAL LOW BACK PAIN WITH BILATERAL SCIATICA: ICD-10-CM

## 2024-10-02 DIAGNOSIS — M47.816 LUMBAR FACET ARTHROPATHY: ICD-10-CM

## 2024-10-02 PROCEDURE — 97161 PT EVAL LOW COMPLEX 20 MIN: CPT

## 2024-10-02 PROCEDURE — 97140 MANUAL THERAPY 1/> REGIONS: CPT

## 2024-10-02 PROCEDURE — 97014 ELECTRIC STIMULATION THERAPY: CPT

## 2024-10-02 NOTE — PLAN OF CARE
[facility]   Physical Therapy Initial Evaluation      Date: 10/2/2024   Name: Jarod Arias .  Clinic Number: [id number]    Therapy Diagnosis:        Encounter Diagnoses   Name Primary?    Chronic bilateral low back pain with bilateral sciatica      Acute right-sided low back pain with right-sided sciatica Yes    Decreased ROM of lumbar spine        Physician: Luigi Tristan MD     Physician Orders: PT Eval and Treat   Medical Diagnosis from Referral:   M54.42 (ICD-10-CM) - Lumbago with sciatica, left side   M54.41 (ICD-10-CM) - Lumbago with sciatica, right side   G89.29 (ICD-10-CM) - Other chronic pain   Evaluation Date: 4/17/2024  Authorization Period Expiration: TBD  Plan of Care Expiration: 12/2/2024  Progress Note Due: 11/2/2024  Visit # / Visits authorized: 1/1   FOTO: 1/5     FOTO Initial Evaluation: 57  FOTO 2nd F/U: NA  FOTO Discharge: NA     Precautions: Standard      Time In: 830  Time Out: 915  Total Appointment Time (timed & untimed codes): 45 minutes        SUBJECTIVE      Date of onset: 2 weeks ago     History of current condition - Jarod reports: he lifted up a case of water and felt that he strained the back. Patient reports the next day he woke up with difficulties getting out the bed. Patient started getting his radiating pain down the R leg into the calf. Patient reports he has been doing his home exercises lately which has helped some but has been wanting to get dry needling and cupping.         Imaging, bone scan films: Mild levoconvex curvature lumbar spine. Disc space narrowing L3-4 and L5-S1. Few mm retrolisthesis L5 on S1. No significant motion with flexion extension. Facet arthropathy lumbosacral level.      Prior Therapy: Yes  Occupation:  - stand up desk  Prior Level of Function: Independent with ADLs, gardening  Current Level of Function: Not able to lift heavy packs of water.      Pain:  Current 4/10, worst 5/10, best 2/10   Location:  Mainly R low back and R glute     Description: Tight, Sharp, and Shooting  Aggravating Factors: sitting   Easing Factors: medication, stretching      Patients goals: To reduce back pain      Medical History:        Past Medical History:   Diagnosis Date    Allergy      Elevated blood pressure reading without diagnosis of hypertension      Gout      Left wrist fracture           Surgical History:   Jarod Apple [place] Sr.  has a past surgical history that includes [place] tooth extraction and Pilonidal cyst drainage.     Medications:   Jarod has a current medication list which includes the following prescription(s): acetaminophen, allopurinol, alprazolam, cetirizine, diphenhydramine, fluticasone propionate, gabapentin, meloxicam, and pseudoephedrine.     Allergies:        Review of patient's allergies indicates:   Allergen Reactions    No known drug allergies              OBJECTIVE      Lumbar AROM: Pain/Dysfunction with Movement:   Flexion: 60 degrees Increased R sided back and hip pain    Extension: 20 degrees    Right side bendin degrees    Left side bendin degrees     Right rotation: WNL degrees     Left rotation: WNL degrees     Extension + R Rotation: WNL degree     Extension + L Rotation: WNL degree        Myotome / MMT Right Left Pain/Dysfunction with Movement   L1,2- Hip Flexion (Femoral Nerve) 4+/5 5/5     L3,4- Knee Extension (Femoral Nerve) 4+/5 ! 5/5 Pain R low back and buttock    L4,5- Ankle Dorsiflexion (Deep Fibular Nerve) 5/5 5/5     L5, S1- Ankle plantarflexion (Tibial Nerve) 5/5 5/5     Hip Abduction 4/5 4+/5     Knee Flexion 4+/5 5/5           Posture:     Repeated Motions Positive/Negative   Flexion Worsened    Extension Improves      Neural Tension Positive/Negative   Passive SLR w/ DF  + on R                   Palpation:  - Tenderness to R lower back / paraspinals         Lumbar Manual Distraction:           FOTO Lumbar Survey     Therapist reviewed FOTO scores for Jarod Apple  [place] Sr. on 4/17/2024.   FOTO documents entered into [employer] - see Media section.     Intake Score: 57            TREATMENT      Total Treatment time (time-based codes) separate from Evaluation: 25 minutes           manual therapy techniques: were applied to the: Lumbar spine or R gluteal region for 25 minutes, including:        Palpation performed to determine muscular tightness and trigger points.         Functional Dry Needling to R gluteal region / piriformis with 4 75mm needles with mechanical winding for manual stimulation and estim for electrical stimulation. Patient had great muscular contractions from estim today.      Functional Dry Needling to R and L lumbar paraspinals at L4-L5 with 2 40mm needles on each side. Patient was hooked to estim on R side only and had great muscular contractions from needling. Patient had no adverse effects from today      Cupping to R gluteal region               PATIENT EDUCATION AND HOME EXERCISES      Education provided:   - Purpose of PT  - HEP     Written Home Exercises Provided: yes. Exercises were reviewed and Jarod was able to demonstrate them prior to the end of the session.  Jarod demonstrated good  understanding of the education provided. See EMR under Patient Instructions for exercises provided during therapy sessions.     ASSESSMENT      Jarod is a 48 y.o. male referred to outpatient Physical Therapy with a medical diagnosis of   M54.42 (ICD-10-CM) - Lumbago with sciatica, left side   M54.41 (ICD-10-CM) - Lumbago with sciatica, right side   G89.29 (ICD-10-CM) - Other chronic pain      Patient presents with a 2 week history of lower back pain with radicular symptoms to the R lower leg after lifting a case of water. Patient has had a history of this issue that has been off and on, but has resolved with therapy. Patient presents with limited lumbar range of motion and R lower ex strength. Patients signs and symptoms present similarly to a lumbar disc pathology.  Patient was progressed with therex today to address problem list.     Patient prognosis is Good.   Patient will benefit from skilled outpatient Physical Therapy to address the deficits stated above and in the chart below, provide patient /family education, and to maximize patientt's level of independence.      Plan of care discussed with patient: Yes  Patient's spiritual, cultural and educational needs considered and patient is agreeable to the plan of care and goals as stated below:      Anticipated Barriers for therapy: None     Medical Necessity is demonstrated by the following  History  Co-morbidities and personal factors that may impact the plan of care Co-morbidities:   None     Personal Factors:   no deficits       low   Examination  Body Structures and Functions, activity limitations and participation restrictions that may impact the plan of care Body Regions:   back  lower extremities     Body Systems:    ROM  strength     Participation Restrictions:   None     Activity limitations:   Learning and applying knowledge  no deficits     General Tasks and Commands  no deficits     Communication  no deficits     Mobility  no deficits     Self care  no deficits     Domestic Life  no deficits     Interactions/Relationships  no deficits     Life Areas  no deficits     Community and Social Life  recreation and leisure             high   Clinical Presentation stable and uncomplicated low   Decision Making/ Complexity Score: low      Goals:  Short Term Goals (4 Weeks):  1. Pt will be compliant with HEP to supplement PT in decreasing pain with functional mobility.  2. Pt will perform pallof press with good control to demonstrate improved core strength.  3. Pt will improve lumbar ROM by 5-10 degrees in all planes to improve functional mobility.  4. Pt will improve impaired LE MMTs by 1/2 score to improve strength for functional tasks.  Long Term Goals (8 Weeks):   1. Pt will improve FOTO score to >/= 65 to decrease  perceived limitation with maintaining/changing body position.   2. Pt will perform squat with good form to reduce risk of re injury with lifting.  3. Pt will improve impaired LE MMTs by 1 score to improve strength for functional tasks.  4. Pt will report no pain during lumbar ROM to promote functional mobility.         PLAN   Plan of care Certification: 12/2/2024     Outpatient Physical Therapy 1-2 times weekly for 8 weeks to include the following interventions: Cervical/Lumbar Traction, Manual Therapy, Moist Heat/ Ice, Neuromuscular Re-ed, Patient Education, Self Care, Therapeutic Activities, and FDN.      Eduar Marion, PT        I CERTIFY THE NEED FOR THESE SERVICES FURNISHED UNDER THIS PLAN OF TREATMENT AND WHILE UNDER MY CARE   Physician's comments:      Physician's Signature: ___________________________________________________

## 2024-10-10 ENCOUNTER — CLINICAL SUPPORT (OUTPATIENT)
Dept: REHABILITATION | Facility: HOSPITAL | Age: 49
End: 2024-10-10
Payer: COMMERCIAL

## 2024-10-10 DIAGNOSIS — M53.86 DECREASED ROM OF LUMBAR SPINE: ICD-10-CM

## 2024-10-10 DIAGNOSIS — M54.41 ACUTE RIGHT-SIDED LOW BACK PAIN WITH RIGHT-SIDED SCIATICA: Primary | ICD-10-CM

## 2024-10-10 PROCEDURE — 97014 ELECTRIC STIMULATION THERAPY: CPT

## 2024-10-10 PROCEDURE — 97140 MANUAL THERAPY 1/> REGIONS: CPT

## 2024-10-10 PROCEDURE — 97110 THERAPEUTIC EXERCISES: CPT

## 2024-10-10 PROCEDURE — 97530 THERAPEUTIC ACTIVITIES: CPT

## 2024-10-10 NOTE — PROGRESS NOTES
[facility]   Physical Therapy Treatment Note      Name: Jarod Cruzac Sr.  Clinic Number: [id number]    Therapy Diagnosis:   Encounter Diagnoses   Name Primary?    Acute right-sided low back pain with right-sided sciatica Yes    Decreased ROM of lumbar spine      Physician: Luigi Tristan MD    Visit Date: 10/10/2024    Physician Orders: PT Eval and Treat   Medical Diagnosis from Referral:   M54.42 (ICD-10-CM) - Lumbago with sciatica, left side   M54.41 (ICD-10-CM) - Lumbago with sciatica, right side   G89.29 (ICD-10-CM) - Other chronic pain   Evaluation Date: 2024  Authorization Period Expiration: TBD  Plan of Care Expiration: 2024  Progress Note Due: 2024  Visit # / Visits authorized:   FOTO: 2/     FOTO Initial Evaluation: 57  FOTO 2nd F/U: NA  FOTO Discharge: NA     Precautions: Standard      Time In: 915  Time Out: 1000  Total Appointment Time (timed & untimed codes): 45 minutes    PTA Visit #: 0/5       Subjective     Patient reports: he drove to [place] this weekend and he feels better than he thought he would.  He was compliant with home exercise program.  Response to previous treatment: No adverse effects   Functional change: None    Pain: 4/10  Location: right back, buttock, hamstring, calf       Objective      Initial Evaluation     Lumbar AROM: Pain/Dysfunction with Movement:   Flexion: 60 degrees Increased R sided back and hip pain    Extension: 20 degrees     Right side bendin degrees     Left side bendin degrees     Right rotation: WNL degrees     Left rotation: WNL degrees     Extension + R Rotation: WNL degree     Extension + L Rotation: WNL degree        Myotome / MMT Right Left Pain/Dysfunction with Movement   L1,2- Hip Flexion (Femoral Nerve) 4+/5 5/5     L3,4- Knee Extension (Femoral Nerve) 4+/5 ! 5/5 Pain R low back and buttock    L4,5- Ankle Dorsiflexion (Deep Fibular Nerve) 5/5 5/5     L5, S1- Ankle plantarflexion  (Tibial Nerve) 5/5 5/5     Hip Abduction 4/5 4+/5     Knee Flexion 4+/5 5/5           Posture:     Repeated Motions Positive/Negative   Flexion Worsened    Extension Improves      Neural Tension Positive/Negative   Passive SLR w/ DF  + on R                   Palpation:  - Tenderness to R lower back / paraspinals         Lumbar Manual Distraction:       Treatment     Jarod received the treatments listed below:      therapeutic exercises to develop strength, endurance, ROM, and flexibility for 10 minutes including:    Calf stretch 30s x 5   Foam Roller   - calf and hamstring   - 1 min x 2     manual therapy techniques:  were applied to the: lumbar spine and glute for 25 minutes, including:    Palpation performed to determine muscular tightness and trigger points.         Functional Dry Needling to R gluteal region / piriformis with 4 75mm needles with mechanical winding for manual stimulation and estim for electrical stimulation. Patient had great muscular contractions from estim today.      Functional Dry Needling to R and L lumbar paraspinals at L4-L5 with 2 40mm needles on each side. Patient was hooked to estim on R side only and had great muscular contractions from needling. Patient had no adverse effects from today      Cupping to R gluteal region        neuromuscular re-education activities to improve: Coordination, Kinesthetic, Proprioception, and motor control for 0 minutes. The following activities were included:      therapeutic activities to improve functional performance for 10  minutes, including:    Shuttle leg press double   - 3.5c   - 3x10   Shuttle sl press   - 2c 3x10   Shuttle donkey kicks   - 0.5c 2x10       Patient Education and Home Exercises       Education provided:   - HEP    Written Home Exercises Provided: Yes. Exercises were reviewed and Jarod was able to demonstrate them prior to the end of the session.  Jarod demonstrated good  understanding of the education provided. See Electronic  Medical Record under Patient Instructions for exercises provided during therapy sessions    Assessment     Patient presents to PT with continued R lower back pain with radiculopathy to the R calf. Patient was progressed with TE, TA, Manual to address problem list. Patient tolerated progressions well today without adverse effects.     Jarod Is progressing well towards his goals.   Patient prognosis is Good.     Patient will continue to benefit from skilled outpatient physical therapy to address the deficits listed in the problem list box on initial evaluation, provide pt/family education and to maximize pt's level of independence in the home and community environment.     Patient's spiritual, cultural and educational needs considered and pt agreeable to plan of care and goals.     Anticipated barriers to physical therapy: None    Goals:   Short Term Goals (4 Weeks):  1. Pt will be compliant with HEP to supplement PT in decreasing pain with functional mobility.  2. Pt will perform pallof press with good control to demonstrate improved core strength.  3. Pt will improve lumbar ROM by 5-10 degrees in all planes to improve functional mobility.  4. Pt will improve impaired LE MMTs by 1/2 score to improve strength for functional tasks.  Long Term Goals (8 Weeks):   1. Pt will improve FOTO score to >/= 65 to decrease perceived limitation with maintaining/changing body position.   2. Pt will perform squat with good form to reduce risk of re injury with lifting.  3. Pt will improve impaired LE MMTs by 1 score to improve strength for functional tasks.  4. Pt will report no pain during lumbar ROM to promote functional mobility.         PLAN   Plan of care Certification: 12/2/2024     Outpatient Physical Therapy 1-2 times weekly for 8 weeks to include the following interventions: Cervical/Lumbar Traction, Manual Therapy, Moist Heat/ Ice, Neuromuscular Re-ed, Patient Education, Self Care, Therapeutic Activities, and FDN.      Eduar Marion, PT

## 2024-10-17 ENCOUNTER — CLINICAL SUPPORT (OUTPATIENT)
Dept: REHABILITATION | Facility: HOSPITAL | Age: 49
End: 2024-10-17
Payer: COMMERCIAL

## 2024-10-17 DIAGNOSIS — M53.86 DECREASED ROM OF LUMBAR SPINE: ICD-10-CM

## 2024-10-17 DIAGNOSIS — M54.41 ACUTE RIGHT-SIDED LOW BACK PAIN WITH RIGHT-SIDED SCIATICA: Primary | ICD-10-CM

## 2024-10-17 PROCEDURE — 97140 MANUAL THERAPY 1/> REGIONS: CPT

## 2024-10-17 PROCEDURE — 97014 ELECTRIC STIMULATION THERAPY: CPT

## 2024-10-17 PROCEDURE — 97110 THERAPEUTIC EXERCISES: CPT

## 2024-10-17 NOTE — PROGRESS NOTES
[facility]   Physical Therapy Treatment Note      Name: Jarod Cruzac Sr.  Clinic Number: [id number]    Therapy Diagnosis:   Encounter Diagnoses   Name Primary?    Acute right-sided low back pain with right-sided sciatica Yes    Decreased ROM of lumbar spine        Physician: Luigi Tristan MD    Visit Date: 10/17/2024    Physician Orders: PT Eval and Treat   Medical Diagnosis from Referral:   M54.42 (ICD-10-CM) - Lumbago with sciatica, left side   M54.41 (ICD-10-CM) - Lumbago with sciatica, right side   G89.29 (ICD-10-CM) - Other chronic pain   Evaluation Date: 2024  Authorization Period Expiration: TBD  Plan of Care Expiration: 2024  Progress Note Due: 2024  Visit # / Visits authorized:   FOTO: 2/     FOTO Initial Evaluation: 57  FOTO 2nd F/U: NA  FOTO Discharge: NA     Precautions: Standard      Time In: 915  Time Out: 1000  Total Appointment Time (timed & untimed codes): 45 minutes    PTA Visit #: 0/5       Subjective     Patient reports: he drove to [place] this weekend and he feels better than he thought he would.      He was compliant with home exercise program.  Response to previous treatment: No adverse effects   Functional change: None    Pain: 4/10  Location: right back, buttock, hamstring, calf       Objective      Initial Evaluation     Lumbar AROM: Pain/Dysfunction with Movement:   Flexion: 60 degrees Increased R sided back and hip pain    Extension: 20 degrees     Right side bendin degrees     Left side bendin degrees     Right rotation: WNL degrees     Left rotation: WNL degrees     Extension + R Rotation: WNL degree     Extension + L Rotation: WNL degree        Myotome / MMT Right Left Pain/Dysfunction with Movement   L1,2- Hip Flexion (Femoral Nerve) 4+/5 5/5     L3,4- Knee Extension (Femoral Nerve) 4+/5 ! 5/5 Pain R low back and buttock    L4,5- Ankle Dorsiflexion (Deep Fibular Nerve) 5/5 5/5     L5, S1- Ankle  plantarflexion (Tibial Nerve) 5/5 5/5     Hip Abduction 4/5 4+/5     Knee Flexion 4+/5 5/5           Posture:     Repeated Motions Positive/Negative   Flexion Worsened    Extension Improves      Neural Tension Positive/Negative   Passive SLR w/ DF  + on R                   Palpation:  - Tenderness to R lower back / paraspinals         Lumbar Manual Distraction:       Treatment     Jarod received the treatments listed below:      therapeutic exercises to develop strength, endurance, ROM, and flexibility for 10 minutes including:    Calf stretch 30s x 5   Foam Roller   - calf, piriformis, hamstring   - 1 min x 2     manual therapy techniques:  were applied to the: lumbar spine and glute for 35 minutes, including:    Palpation performed to determine muscular tightness and trigger points.         Functional Dry Needling to R gluteal region / piriformis with 4 75mm needles with mechanical winding for manual stimulation and estim for electrical stimulation. Patient had great muscular contractions from estim today.      Functional Dry Needling to R and L lumbar paraspinals at L4-L5 with 2 40mm needles on each side. Patient was hooked to estim on R side only and had great muscular contractions from needling. Patient had no adverse effects from today      Dynamic Cupping to R gluteal region        neuromuscular re-education activities to improve: Coordination, Kinesthetic, Proprioception, and motor control for 0 minutes. The following activities were included:      therapeutic activities to improve functional performance for 0  minutes, including:    Shuttle leg press double   - 3.5c   - 3x10   Shuttle sl press   - 2c 3x10   Shuttle donkey kicks   - 0.5c 2x10       Patient Education and Home Exercises       Education provided:   - HEP    Written Home Exercises Provided: Yes. Exercises were reviewed and Jarod was able to demonstrate them prior to the end of the session.  Jarod demonstrated good  understanding of the education  provided. See Electronic Medical Record under Patient Instructions for exercises provided during therapy sessions    Assessment     Patient presents to PT with continued R lower back pain with radiculopathy to the R calf. Patient was progressed with TE, TA, Manual to address problem list. Patient tolerated progressions well today without adverse effects.     Jarod Is progressing well towards his goals.   Patient prognosis is Good.     Patient will continue to benefit from skilled outpatient physical therapy to address the deficits listed in the problem list box on initial evaluation, provide pt/family education and to maximize pt's level of independence in the home and community environment.     Patient's spiritual, cultural and educational needs considered and pt agreeable to plan of care and goals.     Anticipated barriers to physical therapy: None    Goals:   Short Term Goals (4 Weeks):  1. Pt will be compliant with HEP to supplement PT in decreasing pain with functional mobility.  2. Pt will perform pallof press with good control to demonstrate improved core strength.  3. Pt will improve lumbar ROM by 5-10 degrees in all planes to improve functional mobility.  4. Pt will improve impaired LE MMTs by 1/2 score to improve strength for functional tasks.  Long Term Goals (8 Weeks):   1. Pt will improve FOTO score to >/= 65 to decrease perceived limitation with maintaining/changing body position.   2. Pt will perform squat with good form to reduce risk of re injury with lifting.  3. Pt will improve impaired LE MMTs by 1 score to improve strength for functional tasks.  4. Pt will report no pain during lumbar ROM to promote functional mobility.         PLAN   Plan of care Certification: 12/2/2024     Outpatient Physical Therapy 1-2 times weekly for 8 weeks to include the following interventions: Cervical/Lumbar Traction, Manual Therapy, Moist Heat/ Ice, Neuromuscular Re-ed, Patient Education, Self Care, Therapeutic  Activities, and FDN.     Eduar Marion, PT

## 2024-10-24 ENCOUNTER — CLINICAL SUPPORT (OUTPATIENT)
Dept: REHABILITATION | Facility: HOSPITAL | Age: 49
End: 2024-10-24
Payer: COMMERCIAL

## 2024-10-24 DIAGNOSIS — M53.86 DECREASED ROM OF LUMBAR SPINE: ICD-10-CM

## 2024-10-24 DIAGNOSIS — M54.41 ACUTE RIGHT-SIDED LOW BACK PAIN WITH RIGHT-SIDED SCIATICA: Primary | ICD-10-CM

## 2024-10-24 PROCEDURE — 97112 NEUROMUSCULAR REEDUCATION: CPT

## 2024-10-24 PROCEDURE — 97110 THERAPEUTIC EXERCISES: CPT

## 2024-10-24 NOTE — PROGRESS NOTES
[facility]   Physical Therapy Treatment Note      Name: Jarod Cruzac Sr.  Clinic Number: [id number]    Therapy Diagnosis:   Encounter Diagnoses   Name Primary?    Acute right-sided low back pain with right-sided sciatica Yes    Decreased ROM of lumbar spine          Physician: Luigi Tristan MD    Visit Date: 10/24/2024    Physician Orders: PT Eval and Treat   Medical Diagnosis from Referral:   M54.42 (ICD-10-CM) - Lumbago with sciatica, left side   M54.41 (ICD-10-CM) - Lumbago with sciatica, right side   G89.29 (ICD-10-CM) - Other chronic pain   Evaluation Date: 2024  Authorization Period Expiration: 2024  Plan of Care Expiration: 2024  Progress Note Due: 2024  Visit # / Visits authorized: ,    FOTO: 3/5     FOTO Initial Evaluation: 57  FOTO 2nd F/U: NA  FOTO Discharge: NA     Precautions: Standard      Time In: 915  Time Out: 1000  Total Appointment Time (timed & untimed codes): 45 minutes    PTA Visit #: 0/5       Subjective     Patient reports: he is doing ok today. Patient would like to try to continue with HEP and return in 2 weeks for a check in.     He was compliant with home exercise program.  Response to previous treatment: No adverse effects   Functional change: None    Pain: 4/10  Location: right back, buttock, hamstring, calf       Objective      Initial Evaluation     Lumbar AROM: Pain/Dysfunction with Movement:   Flexion: 60 degrees Increased R sided back and hip pain    Extension: 20 degrees     Right side bendin degrees     Left side bendin degrees     Right rotation: WNL degrees     Left rotation: WNL degrees     Extension + R Rotation: WNL degree     Extension + L Rotation: WNL degree        Myotome / MMT Right Left Pain/Dysfunction with Movement   L1,2- Hip Flexion (Femoral Nerve) 4+/5 5/5     L3,4- Knee Extension (Femoral Nerve) 4+/5 ! 5/5 Pain R low back and buttock    L4,5- Ankle Dorsiflexion (Deep Fibular Nerve)  5/5 5/5     L5, S1- Ankle plantarflexion (Tibial Nerve) 5/5 5/5     Hip Abduction 4/5 4+/5     Knee Flexion 4+/5 5/5           Posture:     Repeated Motions Positive/Negative   Flexion Worsened    Extension Improves      Neural Tension Positive/Negative   Passive SLR w/ DF  + on R                   Palpation:  - Tenderness to R lower back / paraspinals         Lumbar Manual Distraction:       Treatment     Jarod received the treatments listed below:      therapeutic exercises to develop strength, endurance, ROM, and flexibility for 10 minutes including:    Calf stretch 30s x 5   Foam Roller   - calf, piriformis, hamstring   - 1 min x 2     manual therapy techniques:  were applied to the: lumbar spine and glute for 0 minutes, including:    Palpation performed to determine muscular tightness and trigger points.         Functional Dry Needling to R gluteal region / piriformis with 4 75mm needles with mechanical winding for manual stimulation and estim for electrical stimulation. Patient had great muscular contractions from estim today.      Functional Dry Needling to R and L lumbar paraspinals at L4-L5 with 2 40mm needles on each side. Patient was hooked to estim on R side only and had great muscular contractions from needling. Patient had no adverse effects from today      Dynamic Cupping to R gluteal region        neuromuscular re-education activities to improve: Coordination, Kinesthetic, Proprioception, and motor control for 35 minutes. The following activities were included:    PPT w/ biofeedback   - x15  PPT w/ kickouts and biofeedback   - x15  Dead bugs legs only  - x15 3 sets   Pallof press BTB x20  Pallof press + rotations BTB x20  Pot Stirs BTB x20     therapeutic activities to improve functional performance for 0  minutes, including:    Shuttle leg press double   - 3.5c   - 3x10   Shuttle sl press   - 2c 3x10   Shuttle donkey kicks   - 0.5c 2x10       Patient Education and Home Exercises       Education  provided:   - HEP    Written Home Exercises Provided: Yes. Exercises were reviewed and Jarod was able to demonstrate them prior to the end of the session.  Jarod demonstrated good  understanding of the education provided. See Electronic Medical Record under Patient Instructions for exercises provided during therapy sessions    Assessment     Patient presents to PT with continued R lower back pain with radiculopathy to the R calf, but improving. Patient was progressed with NMR to address problem list. Patient tolerated progressions well today without adverse effects.     Jarod Is progressing well towards his goals.   Patient prognosis is Good.     Patient will continue to benefit from skilled outpatient physical therapy to address the deficits listed in the problem list box on initial evaluation, provide pt/family education and to maximize pt's level of independence in the home and community environment.     Patient's spiritual, cultural and educational needs considered and pt agreeable to plan of care and goals.     Anticipated barriers to physical therapy: None    Goals:   Short Term Goals (4 Weeks):  1. Pt will be compliant with HEP to supplement PT in decreasing pain with functional mobility.  2. Pt will perform pallof press with good control to demonstrate improved core strength.  3. Pt will improve lumbar ROM by 5-10 degrees in all planes to improve functional mobility.  4. Pt will improve impaired LE MMTs by 1/2 score to improve strength for functional tasks.  Long Term Goals (8 Weeks):   1. Pt will improve FOTO score to >/= 65 to decrease perceived limitation with maintaining/changing body position.   2. Pt will perform squat with good form to reduce risk of re injury with lifting.  3. Pt will improve impaired LE MMTs by 1 score to improve strength for functional tasks.  4. Pt will report no pain during lumbar ROM to promote functional mobility.         PLAN   Plan of care Certification: 12/2/2024      Outpatient Physical Therapy 1-2 times weekly for 8 weeks to include the following interventions: Cervical/Lumbar Traction, Manual Therapy, Moist Heat/ Ice, Neuromuscular Re-ed, Patient Education, Self Care, Therapeutic Activities, and FDN.     Eduar Marion, PT

## 2024-11-05 ENCOUNTER — DOCUMENTATION ONLY (OUTPATIENT)
Dept: INTERNAL MEDICINE | Facility: CLINIC | Age: 49
End: 2024-11-05
Payer: COMMERCIAL

## 2024-11-05 ENCOUNTER — PATIENT MESSAGE (OUTPATIENT)
Dept: ADMINISTRATIVE | Facility: HOSPITAL | Age: 49
End: 2024-11-05
Payer: COMMERCIAL

## 2024-11-05 DIAGNOSIS — Z12.11 COLON CANCER SCREENING: Primary | ICD-10-CM

## 2024-11-06 DIAGNOSIS — Z12.11 SCREENING FOR COLON CANCER: ICD-10-CM

## 2024-11-12 ENCOUNTER — LAB VISIT (OUTPATIENT)
Dept: LAB | Facility: HOSPITAL | Age: 49
End: 2024-11-12
Attending: INTERNAL MEDICINE
Payer: COMMERCIAL

## 2024-11-12 DIAGNOSIS — Z12.11 COLON CANCER SCREENING: ICD-10-CM

## 2024-11-12 LAB — HEMOCCULT STL QL IA: NEGATIVE

## 2024-11-12 PROCEDURE — 82274 ASSAY TEST FOR BLOOD FECAL: CPT | Performed by: INTERNAL MEDICINE

## 2025-03-19 ENCOUNTER — OFFICE VISIT (OUTPATIENT)
Dept: INTERNAL MEDICINE | Facility: CLINIC | Age: 50
End: 2025-03-19
Payer: COMMERCIAL

## 2025-03-19 VITALS
HEART RATE: 69 BPM | HEIGHT: 70 IN | TEMPERATURE: 98 F | OXYGEN SATURATION: 99 % | DIASTOLIC BLOOD PRESSURE: 64 MMHG | BODY MASS INDEX: 25.88 KG/M2 | WEIGHT: 180.75 LBS | RESPIRATION RATE: 12 BRPM | SYSTOLIC BLOOD PRESSURE: 110 MMHG

## 2025-03-19 DIAGNOSIS — J06.9 UPPER RESPIRATORY TRACT INFECTION, UNSPECIFIED TYPE: Primary | ICD-10-CM

## 2025-03-19 PROCEDURE — 99999 PR PBB SHADOW E&M-EST. PATIENT-LVL V: CPT | Mod: PBBFAC,,,

## 2025-03-19 PROCEDURE — 1159F MED LIST DOCD IN RCRD: CPT | Mod: CPTII,S$GLB,,

## 2025-03-19 PROCEDURE — 99213 OFFICE O/P EST LOW 20 MIN: CPT | Mod: S$GLB,,,

## 2025-03-19 PROCEDURE — 3078F DIAST BP <80 MM HG: CPT | Mod: CPTII,S$GLB,,

## 2025-03-19 PROCEDURE — 3008F BODY MASS INDEX DOCD: CPT | Mod: CPTII,S$GLB,,

## 2025-03-19 PROCEDURE — 3074F SYST BP LT 130 MM HG: CPT | Mod: CPTII,S$GLB,,

## 2025-03-19 PROCEDURE — 1160F RVW MEDS BY RX/DR IN RCRD: CPT | Mod: CPTII,S$GLB,,

## 2025-03-19 RX ORDER — PROMETHAZINE HYDROCHLORIDE AND DEXTROMETHORPHAN HYDROBROMIDE 6.25; 15 MG/5ML; MG/5ML
5 SYRUP ORAL NIGHTLY PRN
Qty: 50 ML | Refills: 0 | Status: SHIPPED | OUTPATIENT
Start: 2025-03-19 | End: 2025-03-29

## 2025-03-19 RX ORDER — BENZONATATE 200 MG/1
200 CAPSULE ORAL 2 TIMES DAILY PRN
Qty: 20 CAPSULE | Refills: 0 | Status: SHIPPED | OUTPATIENT
Start: 2025-03-19 | End: 2025-03-29

## 2025-03-19 RX ORDER — FLUTICASONE PROPIONATE 50 MCG
1 SPRAY, SUSPENSION (ML) NASAL DAILY
Qty: 16 G | Refills: 0 | Status: SHIPPED | OUTPATIENT
Start: 2025-03-19

## 2025-03-19 NOTE — PROGRESS NOTES
Subjective:       Patient ID: Jarod Arias Sr. is a 49 y.o. male.    Chief Complaint: Nasal Congestion and Cough    Fever   Associated symptoms include coughing.   Cough  Associated symptoms include a fever.       History of Present Illness      CHIEF COMPLAINT:  Mr. Arias presents today for upper respiratory symptoms.    HISTORY OF PRESENT ILLNESS:  He developed upper respiratory symptoms starting Friday night/Saturday morning at 3am with rhinorrhea. Symptoms progressed to fever of 102°F on Monday, which decreased to 100-101°F on Tuesday along with worsening nasal congestion. He subsequently developed a persistent cough. Currently experiencing severe nasal congestion, persistent cough, and ear pressure. He reports post-nasal drip causing associated GI symptoms. His other symptoms have improved and he is no longer experiencing fever.     MEDICATIONS:  He is currently taking allopurinol. For symptom management, he takes Zyrtec daily and uses Benadryl as needed. He took NyQuil on  and Monday, and used Promethazine DM ( 1-2 months) for cough.      ROS:  ENT: +nasal congestion, +rhinorrhea,+post nasal drip, +ear pressure  Respiratory: +cough     Objective:      Physical Exam  Vitals reviewed.   Constitutional:       General: He is awake. He is not in acute distress.     Appearance: Normal appearance. He is well-developed. He is not ill-appearing, toxic-appearing or diaphoretic.   HENT:      Head: Normocephalic and atraumatic.      Right Ear: Tympanic membrane, ear canal and external ear normal.      Left Ear: Tympanic membrane, ear canal and external ear normal.      Nose: Mucosal edema, congestion and rhinorrhea present.      Right Sinus: No maxillary sinus tenderness or frontal sinus tenderness.      Left Sinus: No maxillary sinus tenderness or frontal sinus tenderness.      Mouth/Throat:      Mouth: Mucous membranes are moist.      Pharynx: Oropharynx is clear. No oropharyngeal exudate.    Eyes:      General: No scleral icterus.     Conjunctiva/sclera: Conjunctivae normal.      Pupils: Pupils are equal, round, and reactive to light.   Cardiovascular:      Rate and Rhythm: Normal rate and regular rhythm.      Pulses: Normal pulses.      Heart sounds: Normal heart sounds. No murmur heard.     No friction rub. No gallop.   Pulmonary:      Effort: Pulmonary effort is normal. No respiratory distress.      Breath sounds: Normal breath sounds. No wheezing, rhonchi or rales.   Abdominal:      General: Bowel sounds are normal. There is no distension.      Palpations: Abdomen is soft. There is no mass.      Tenderness: There is no abdominal tenderness. There is no guarding.   Musculoskeletal:         General: Normal range of motion.   Skin:     General: Skin is warm and dry.      Capillary Refill: Capillary refill takes less than 2 seconds.   Neurological:      Mental Status: He is alert and oriented to person, place, and time. Mental status is at baseline.      GCS: GCS eye subscore is 4. GCS verbal subscore is 5. GCS motor subscore is 6.   Psychiatric:         Behavior: Behavior is cooperative.           Physical Exam            Assessment:       1. Upper respiratory tract infection, unspecified type  - fluticasone propionate (FLONASE) 50 mcg/actuation nasal spray; 1 spray (50 mcg total) by Each Nostril route once daily.  Dispense: 16 g; Refill: 0  - benzonatate (TESSALON) 200 MG capsule; Take 1 capsule (200 mg total) by mouth 2 (two) times daily as needed for Cough.  Dispense: 20 capsule; Refill: 0  - promethazine-dextromethorphan (PROMETHAZINE-DM) 6.25-15 mg/5 mL Syrp; Take 5 mLs by mouth nightly as needed (Nighttime cough suppression).  Dispense: 50 mL; Refill: 0      Plan:         Assessment & Plan    ACUTE UPPER RESPIRATORY INFECTION:  - Observed current symptoms of nasal congestion, persistent cough, and ear pressure.  - Physical exam revealed inflammation in nasal passages and mucus production.  -  Lungs were clear on auscultation and ears appeared normal.  - Assessed the condition as a viral upper respiratory illness causing significant inflammation, particularly in sinuses and nasal passages.  - Recommend continuing Zyrtec, adding Flonase nasal spray, nasal saline rinse, and cough suppressants (Tessalon Perles for daytime, Promethazine DM for nighttime).  - Advised on supportive care measures including cool mist humidifier, hot tea with honey, warm saline gargle, hydration, rest, and good nutrition.      Return to clinic if symptoms worsen or fail to improve.             This note was generated with the assistance of ambient listening technology. Verbal consent was obtained by the patient and accompanying visitor(s) for the recording of patient appointment to facilitate this note. I attest to having reviewed and edited the generated note for accuracy, though some syntax or spelling errors may persist. Please contact the author of this note for any clarification.

## 2025-06-05 ENCOUNTER — PATIENT MESSAGE (OUTPATIENT)
Dept: INTERNAL MEDICINE | Facility: CLINIC | Age: 50
End: 2025-06-05
Payer: COMMERCIAL

## 2025-06-19 ENCOUNTER — TELEPHONE (OUTPATIENT)
Dept: INTERNAL MEDICINE | Facility: CLINIC | Age: 50
End: 2025-06-19
Payer: COMMERCIAL

## 2025-06-20 ENCOUNTER — OFFICE VISIT (OUTPATIENT)
Dept: INTERNAL MEDICINE | Facility: CLINIC | Age: 50
End: 2025-06-20
Payer: COMMERCIAL

## 2025-06-20 ENCOUNTER — RESULTS FOLLOW-UP (OUTPATIENT)
Dept: INTERNAL MEDICINE | Facility: CLINIC | Age: 50
End: 2025-06-20

## 2025-06-20 ENCOUNTER — LAB VISIT (OUTPATIENT)
Dept: LAB | Facility: HOSPITAL | Age: 50
End: 2025-06-20
Payer: COMMERCIAL

## 2025-06-20 ENCOUNTER — PATIENT MESSAGE (OUTPATIENT)
Dept: INTERNAL MEDICINE | Facility: CLINIC | Age: 50
End: 2025-06-20

## 2025-06-20 VITALS
BODY MASS INDEX: 25.91 KG/M2 | DIASTOLIC BLOOD PRESSURE: 72 MMHG | SYSTOLIC BLOOD PRESSURE: 118 MMHG | OXYGEN SATURATION: 96 % | HEART RATE: 94 BPM | HEIGHT: 70 IN | WEIGHT: 181 LBS

## 2025-06-20 DIAGNOSIS — Z12.5 PROSTATE CANCER SCREENING: ICD-10-CM

## 2025-06-20 DIAGNOSIS — M10.9 GOUT, UNSPECIFIED CAUSE, UNSPECIFIED CHRONICITY, UNSPECIFIED SITE: ICD-10-CM

## 2025-06-20 DIAGNOSIS — F41.9 ANXIETY: Primary | ICD-10-CM

## 2025-06-20 DIAGNOSIS — L25.9 CONTACT DERMATITIS, UNSPECIFIED CONTACT DERMATITIS TYPE, UNSPECIFIED TRIGGER: ICD-10-CM

## 2025-06-20 DIAGNOSIS — Z12.11 COLON CANCER SCREENING: ICD-10-CM

## 2025-06-20 DIAGNOSIS — Z00.00 ANNUAL PHYSICAL EXAM: ICD-10-CM

## 2025-06-20 DIAGNOSIS — Z00.00 ANNUAL PHYSICAL EXAM: Primary | ICD-10-CM

## 2025-06-20 LAB
ABSOLUTE EOSINOPHIL (OHS): 0.2 K/UL
ABSOLUTE MONOCYTE (OHS): 0.41 K/UL (ref 0.3–1)
ABSOLUTE NEUTROPHIL COUNT (OHS): 2.58 K/UL (ref 1.8–7.7)
ALBUMIN SERPL BCP-MCNC: 4.9 G/DL (ref 3.5–5.2)
ALP SERPL-CCNC: 47 UNIT/L (ref 40–150)
ALT SERPL W/O P-5'-P-CCNC: 13 UNIT/L (ref 10–44)
ANION GAP (OHS): 9 MMOL/L (ref 8–16)
AST SERPL-CCNC: 19 UNIT/L (ref 11–45)
BASOPHILS # BLD AUTO: 0.03 K/UL
BASOPHILS NFR BLD AUTO: 0.7 %
BILIRUB SERPL-MCNC: 0.5 MG/DL (ref 0.1–1)
BUN SERPL-MCNC: 17 MG/DL (ref 6–20)
CALCIUM SERPL-MCNC: 9.2 MG/DL (ref 8.7–10.5)
CHLORIDE SERPL-SCNC: 108 MMOL/L (ref 95–110)
CHOLEST SERPL-MCNC: 210 MG/DL (ref 120–199)
CHOLEST/HDLC SERPL: 3.3 {RATIO} (ref 2–5)
CO2 SERPL-SCNC: 26 MMOL/L (ref 23–29)
CREAT SERPL-MCNC: 1.2 MG/DL (ref 0.5–1.4)
ERYTHROCYTE [DISTWIDTH] IN BLOOD BY AUTOMATED COUNT: 14.6 % (ref 11.5–14.5)
GFR SERPLBLD CREATININE-BSD FMLA CKD-EPI: >60 ML/MIN/1.73/M2
GLUCOSE SERPL-MCNC: 86 MG/DL (ref 70–110)
HCT VFR BLD AUTO: 44.9 % (ref 40–54)
HDLC SERPL-MCNC: 63 MG/DL (ref 40–75)
HDLC SERPL: 30 % (ref 20–50)
HGB BLD-MCNC: 14.8 GM/DL (ref 14–18)
IMM GRANULOCYTES # BLD AUTO: 0.01 K/UL (ref 0–0.04)
IMM GRANULOCYTES NFR BLD AUTO: 0.2 % (ref 0–0.5)
LDLC SERPL CALC-MCNC: 130.6 MG/DL (ref 63–159)
LYMPHOCYTES # BLD AUTO: 1.34 K/UL (ref 1–4.8)
MCH RBC QN AUTO: 29.4 PG (ref 27–31)
MCHC RBC AUTO-ENTMCNC: 33 G/DL (ref 32–36)
MCV RBC AUTO: 89 FL (ref 82–98)
MICROSCOPIC COMMENT: NORMAL
NONHDLC SERPL-MCNC: 147 MG/DL
NUCLEATED RBC (/100WBC) (OHS): 0 /100 WBC
PLATELET # BLD AUTO: 273 K/UL (ref 150–450)
PMV BLD AUTO: 9.4 FL (ref 9.2–12.9)
POTASSIUM SERPL-SCNC: 4.3 MMOL/L (ref 3.5–5.1)
PROT SERPL-MCNC: 7 GM/DL (ref 6–8.4)
PSA SERPL-MCNC: 0.21 NG/ML
RBC # BLD AUTO: 5.03 M/UL (ref 4.6–6.2)
RBC #/AREA URNS AUTO: <1 /HPF (ref 0–4)
RELATIVE EOSINOPHIL (OHS): 4.4 %
RELATIVE LYMPHOCYTE (OHS): 29.3 % (ref 18–48)
RELATIVE MONOCYTE (OHS): 9 % (ref 4–15)
RELATIVE NEUTROPHIL (OHS): 56.4 % (ref 38–73)
SODIUM SERPL-SCNC: 143 MMOL/L (ref 136–145)
TRIGL SERPL-MCNC: 82 MG/DL (ref 30–150)
URATE SERPL-MCNC: 3.6 MG/DL (ref 3.4–7)
WBC # BLD AUTO: 4.57 K/UL (ref 3.9–12.7)
WBC #/AREA URNS AUTO: <1 /HPF (ref 0–5)

## 2025-06-20 PROCEDURE — 36415 COLL VENOUS BLD VENIPUNCTURE: CPT

## 2025-06-20 PROCEDURE — 80053 COMPREHEN METABOLIC PANEL: CPT

## 2025-06-20 PROCEDURE — 84550 ASSAY OF BLOOD/URIC ACID: CPT

## 2025-06-20 PROCEDURE — 80061 LIPID PANEL: CPT

## 2025-06-20 PROCEDURE — 85025 COMPLETE CBC W/AUTO DIFF WBC: CPT

## 2025-06-20 PROCEDURE — 99999 PR PBB SHADOW E&M-EST. PATIENT-LVL IV: CPT | Mod: PBBFAC,,, | Performed by: INTERNAL MEDICINE

## 2025-06-20 PROCEDURE — 81001 URINALYSIS AUTO W/SCOPE: CPT | Performed by: INTERNAL MEDICINE

## 2025-06-20 PROCEDURE — 84153 ASSAY OF PSA TOTAL: CPT

## 2025-06-20 RX ORDER — ALPRAZOLAM 0.5 MG/1
0.5 TABLET ORAL DAILY PRN
Qty: 10 TABLET | Refills: 0 | Status: SHIPPED | OUTPATIENT
Start: 2025-06-20

## 2025-06-20 RX ORDER — TRIAMCINOLONE ACETONIDE 1 MG/G
CREAM TOPICAL 2 TIMES DAILY
Qty: 15 G | Refills: 1 | Status: SHIPPED | OUTPATIENT
Start: 2025-06-20

## 2025-06-20 NOTE — PROGRESS NOTES
CC:  Annual exam     HPI:  The patient is a 49-year-old male with gout, anxiety with flying and sciatica who presents today for annual exam.    ROS: The patient does report losing weight by watching his diet.  He and his wife also joining a gym and exercise several day throughout the week.  He does weights as well as cardio.  His last eye exam was over a year ago.  No auditory changes.  No chest pain.  No shortness a breath.  No nausea vomiting.  No abdominal pain.  He does get up once at night to urinate.  He does report that his stream is not as strong as it used to be.  He does have problems with sciatica involving the right leg.  He does appear to have what appears to be a contact dermatitis on his left ankle and right anterior thigh.  It has been there for 2 weeks.  No numbness or tingling arms and legs on clarification he does report some tingling that can occur in the left foot.  The patient will be turning 50 in August.  No family history of colon cancer or polyps.    Physical exam:   General appearance: No acute distress   HEENT: Conjunctiva is clear.  Pupils equal reactive.  TMs are clear.  Nasal septum is midline without discharge.  Oropharynx is without erythema.  Trachea is midline without JVD and thyromegaly   Pulmonary:  Good inspiratory, expiratory breath sounds were heard.  Lungs are clear auscultation.    Cardiovascular: S1-S2, rhythm is regular.  2+ carotid pulse bruits.  Extremities without edema.    GI:  Abdomen was nontender, nondistended without hepatosplenomegaly  : Digital rectal exam was performed.  The rectum was normal.  Stool is brown heme-negative.  Prostate with a little bit enlarged.  No discrete nodules were felt.  Penis and testes were normal.    Lymphatics: No cervical, axillary or inguinal adenopathy    Assessment:    Annual exam   2.  Gout currently stable on allopurinol   3.  Sciatica currently stable   4.  Contact dermatitis  Plan:    We will put the patient for screening  colonoscopy to be done after he turns 50  2.  Will schedule a CBC, CMP, PSA, lipid panel, UA, uric acid  3.  Will prescribe triamcinolone cream for his ankle fine

## 2025-06-20 NOTE — TELEPHONE ENCOUNTER
Care Due:                  Date            Visit Type   Department     Provider  --------------------------------------------------------------------------------                                EP -                              PRIMARY      [facility]  Last Visit: 06-      CARE ([facility])   MEDICINE       Alex Schofield  Next Visit: None Scheduled  None         None Found                                                            Last  Test          Frequency    Reason                     Performed    Due Date  --------------------------------------------------------------------------------    CBC.........  12 months..  allopurinoL..............  02- 02-    CMP.........  12 months..  allopurinoL..............  02- 02-    Uric Acid...  12 months..  allopurinoL..............  01- 12-    [employer] Embedded Care Due Messages. Reference number: [id number].   6/20/2025 12:59:23 PM CDT

## 2025-06-20 NOTE — TELEPHONE ENCOUNTER
Copied from CRM #[id number]. Topic: Medications - Medication Status Check   >> Jun 20, 2025  9:43 AM Lindsey wrote:  Requesting an RX refill or new RX.    Is this a refill or new RX: Refill 1    RX name and strength (copy/paste from chart):  ALPRAZolam (XANAX) 0.5 MG tablet    Is this a 30 day or 90 day RX:     Pharmacy name and phone # (copy/paste from chart):  [id number] - [place] - [address]  [facility] [address]  [address] [place] [address]  Phone: [phone] Fax: [phone]  Hours: Open 24 hours        Who called and call back number:    The doctors have asked that we provide their patients with the following 2 reminders -- prescription refills can take up to 72 hours, and a friendly reminder that in the future you can use your [facility] account to request refills:       Patient stated that he did not receive the rx alprazolam, please to sent it out. Please contact patient once has been sent. Thank you.

## 2025-06-25 ENCOUNTER — CLINICAL SUPPORT (OUTPATIENT)
Dept: ENDOSCOPY | Facility: HOSPITAL | Age: 50
End: 2025-06-25
Attending: INTERNAL MEDICINE
Payer: COMMERCIAL

## 2025-06-25 DIAGNOSIS — Z12.11 COLON CANCER SCREENING: ICD-10-CM

## 2025-06-26 ENCOUNTER — OFFICE VISIT (OUTPATIENT)
Dept: INTERNAL MEDICINE | Facility: CLINIC | Age: 50
End: 2025-06-26
Payer: COMMERCIAL

## 2025-06-26 ENCOUNTER — CLINICAL SUPPORT (OUTPATIENT)
Dept: ENDOSCOPY | Facility: HOSPITAL | Age: 50
End: 2025-06-26
Attending: INTERNAL MEDICINE
Payer: COMMERCIAL

## 2025-06-26 VITALS
WEIGHT: 174.38 LBS | OXYGEN SATURATION: 98 % | RESPIRATION RATE: 18 BRPM | HEART RATE: 99 BPM | DIASTOLIC BLOOD PRESSURE: 78 MMHG | SYSTOLIC BLOOD PRESSURE: 112 MMHG | HEIGHT: 71 IN | BODY MASS INDEX: 24.41 KG/M2 | TEMPERATURE: 99 F

## 2025-06-26 DIAGNOSIS — R25.2 MUSCLE CRAMPING: ICD-10-CM

## 2025-06-26 DIAGNOSIS — Z12.11 COLON CANCER SCREENING: Primary | ICD-10-CM

## 2025-06-26 DIAGNOSIS — L25.9 CONTACT DERMATITIS, UNSPECIFIED CONTACT DERMATITIS TYPE, UNSPECIFIED TRIGGER: Primary | ICD-10-CM

## 2025-06-26 PROCEDURE — 99999 PR PBB SHADOW E&M-EST. PATIENT-LVL IV: CPT | Mod: PBBFAC,,, | Performed by: INTERNAL MEDICINE

## 2025-06-26 PROCEDURE — 3074F SYST BP LT 130 MM HG: CPT | Mod: CPTII,S$GLB,, | Performed by: INTERNAL MEDICINE

## 2025-06-26 PROCEDURE — 1159F MED LIST DOCD IN RCRD: CPT | Mod: CPTII,S$GLB,, | Performed by: INTERNAL MEDICINE

## 2025-06-26 PROCEDURE — 3078F DIAST BP <80 MM HG: CPT | Mod: CPTII,S$GLB,, | Performed by: INTERNAL MEDICINE

## 2025-06-26 PROCEDURE — 99214 OFFICE O/P EST MOD 30 MIN: CPT | Mod: S$GLB,,, | Performed by: INTERNAL MEDICINE

## 2025-06-26 PROCEDURE — 3008F BODY MASS INDEX DOCD: CPT | Mod: CPTII,S$GLB,, | Performed by: INTERNAL MEDICINE

## 2025-06-26 RX ORDER — CETIRIZINE HYDROCHLORIDE 10 MG/1
TABLET ORAL
COMMUNITY

## 2025-06-26 RX ORDER — PREDNISONE 20 MG/1
TABLET ORAL
Qty: 10 TABLET | Refills: 0 | Status: SHIPPED | OUTPATIENT
Start: 2025-06-26

## 2025-06-26 RX ORDER — METHOCARBAMOL 750 MG/1
TABLET, FILM COATED ORAL
Qty: 20 TABLET | Refills: 0 | Status: SHIPPED | OUTPATIENT
Start: 2025-06-26

## 2025-06-26 NOTE — PROGRESS NOTES
History of present illness:   Healthy 49-year-old gentleman with a history of gout is in today with a persistent rash.  The patient developed rash primarily on left lower leg in right anterior thigh several weeks ago.  Also with smaller patches scattered on upper extremity.  He had been engaged in some yd work pulling home in such prior to this eruption.  He did see his primary care physician Dr. Schofield for general health assessment and also discussed the rash.  He was prescribed topical triamcinolone cream.  He reports there has been no improvement.  He reports no fever no chills no generalized body aches.  No new joint aches or pains.    Current medications:   Medication list noted and reviewed in our electronic medical record medication list.      Review of systems:   Constitutional: No fever no chills no generalized body aches.    HEENT: No hoarseness no dysphagia no URI symptoms.  Respiratory: No cough shortness of breath.    Cardiovascular: No chest pain palpitations.    GI: No nausea no vomiting abdominal pain and no diarrhea.    : No dysuria frequency change in the color or character of his urine.    Musculoskeletal no new joint aches or pains.  He does state that he developed bilateral calf cramps yesterday.  He had traveled and flu yesterday and was also doing more walking.    Physical examination:   General: Pleasant alert appropriately groomed gentleman no acute distress.    Vital signs: All noted and reviewed as normal.  Skin:  Left lower leg just above the ankle there is a large area of papular squamous erythematous eruption with few smaller scattered patches superior.  There are no bullae and no vesicles.  He has a similar though less intense and slightly smaller lesion in the right anterior thigh.  He is very small patch right arm.  Lower extremities full range of motion stability and strength.  2+ pedal pulses.  No calf tenderness induration or other.      Impression:   Skin issues do appear to be  a contact dermatitis not responding to topical therapy.    Plan:   -Prednisone 40 mg daily for five days.   -discussed potential side effects of corticosteroid therapy.  -discussed expected slow resolution and to advise if such is not observed or if any worsening.  -given Robaxin to use p.r.n. for the muscle cramps in the calves.

## 2025-06-27 ENCOUNTER — CLINICAL SUPPORT (OUTPATIENT)
Dept: ENDOSCOPY | Facility: HOSPITAL | Age: 50
End: 2025-06-27
Attending: INTERNAL MEDICINE
Payer: COMMERCIAL

## 2025-06-27 ENCOUNTER — TELEPHONE (OUTPATIENT)
Dept: ENDOSCOPY | Facility: HOSPITAL | Age: 50
End: 2025-06-27

## 2025-06-27 DIAGNOSIS — Z12.11 COLON CANCER SCREENING: Primary | ICD-10-CM

## 2025-06-27 NOTE — PLAN OF CARE
Endoscopy Scheduling Department  [facility]  [address] [place] [address]  Take the [facility] Elevators to 4th Floor Endoscopy Lab      Date: 6/27/2025      Medical Record # [id number]        Dear  Jarod Arias Sr.      An order for the following procedure(s) Colonoscopy was placed for you by     Alex Schofield MD   .    Since multiple attempts have been made to get in touch with you, this is the last notification. Please call the scheduling nurse to schedule this procedure as soon as possible.    If you have already scheduled this appointment, please disregard this letter. If you would like to cancel this request, please call the number listed below.     Sincerely,        Endoscopy Scheduling Department [phone]        Comments: Office hours are Monday through Friday 8-430p.  \

## 2025-08-11 DIAGNOSIS — Z12.11 COLON CANCER SCREENING: Primary | ICD-10-CM

## 2025-08-13 ENCOUNTER — TELEPHONE (OUTPATIENT)
Dept: ENDOSCOPY | Facility: HOSPITAL | Age: 50
End: 2025-08-13
Payer: COMMERCIAL

## 2025-08-26 ENCOUNTER — TELEPHONE (OUTPATIENT)
Dept: ENDOSCOPY | Facility: HOSPITAL | Age: 50
End: 2025-08-26
Payer: COMMERCIAL

## 2025-08-26 ENCOUNTER — TELEPHONE (OUTPATIENT)
Dept: ENDOSCOPY | Facility: HOSPITAL | Age: 50
End: 2025-08-26